# Patient Record
Sex: MALE | Race: WHITE | NOT HISPANIC OR LATINO | Employment: OTHER | URBAN - METROPOLITAN AREA
[De-identification: names, ages, dates, MRNs, and addresses within clinical notes are randomized per-mention and may not be internally consistent; named-entity substitution may affect disease eponyms.]

---

## 2019-03-16 LAB — HBA1C MFR BLD HPLC: 6.5 %

## 2019-10-23 ENCOUNTER — CONSULT (OUTPATIENT)
Dept: NEPHROLOGY | Facility: CLINIC | Age: 79
End: 2019-10-23
Payer: COMMERCIAL

## 2019-10-23 VITALS
BODY MASS INDEX: 35.59 KG/M2 | SYSTOLIC BLOOD PRESSURE: 182 MMHG | WEIGHT: 248.6 LBS | DIASTOLIC BLOOD PRESSURE: 98 MMHG | HEIGHT: 70 IN

## 2019-10-23 DIAGNOSIS — E83.9 CHRONIC KIDNEY DISEASE-MINERAL AND BONE DISORDER: ICD-10-CM

## 2019-10-23 DIAGNOSIS — N18.30 BENIGN HYPERTENSION WITH CHRONIC KIDNEY DISEASE, STAGE III (HCC): ICD-10-CM

## 2019-10-23 DIAGNOSIS — N18.9 CHRONIC KIDNEY DISEASE-MINERAL AND BONE DISORDER: ICD-10-CM

## 2019-10-23 DIAGNOSIS — I12.9 BENIGN HYPERTENSION WITH CHRONIC KIDNEY DISEASE, STAGE III (HCC): ICD-10-CM

## 2019-10-23 DIAGNOSIS — M89.9 CHRONIC KIDNEY DISEASE-MINERAL AND BONE DISORDER: ICD-10-CM

## 2019-10-23 DIAGNOSIS — R80.9 PROTEINURIA, UNSPECIFIED TYPE: Primary | ICD-10-CM

## 2019-10-23 DIAGNOSIS — N06.9 ISOLATED PROTEINURIA WITH MORPHOLOGIC LESION: ICD-10-CM

## 2019-10-23 DIAGNOSIS — N18.30 CKD (CHRONIC KIDNEY DISEASE), STAGE III (HCC): ICD-10-CM

## 2019-10-23 DIAGNOSIS — I10 RESISTANT HYPERTENSION: ICD-10-CM

## 2019-10-23 PROBLEM — R80.0 ISOLATED PROTEINURIA: Status: ACTIVE | Noted: 2019-10-23

## 2019-10-23 PROBLEM — I1A.0 RESISTANT HYPERTENSION: Status: ACTIVE | Noted: 2019-10-23

## 2019-10-23 LAB
SL AMB  POCT GLUCOSE, UA: ABNORMAL
SL AMB LEUKOCYTE ESTERASE,UA: ABNORMAL
SL AMB POCT BILIRUBIN,UA: ABNORMAL
SL AMB POCT BLOOD,UA: ABNORMAL
SL AMB POCT CLARITY,UA: CLEAR
SL AMB POCT COLOR,UA: YELLOW
SL AMB POCT KETONES,UA: ABNORMAL
SL AMB POCT NITRITE,UA: ABNORMAL
SL AMB POCT PH,UA: 6.5
SL AMB POCT SPECIFIC GRAVITY,UA: 1.01
SL AMB POCT URINE PROTEIN: ABNORMAL
SL AMB POCT UROBILINOGEN: 0.2

## 2019-10-23 PROCEDURE — 99204 OFFICE O/P NEW MOD 45 MIN: CPT | Performed by: INTERNAL MEDICINE

## 2019-10-23 PROCEDURE — 81002 URINALYSIS NONAUTO W/O SCOPE: CPT | Performed by: INTERNAL MEDICINE

## 2019-10-23 RX ORDER — LOSARTAN POTASSIUM AND HYDROCHLOROTHIAZIDE 12.5; 5 MG/1; MG/1
2 TABLET ORAL DAILY
Refills: 0 | COMMUNITY
Start: 2019-09-29 | End: 2020-10-08 | Stop reason: SDUPTHER

## 2019-10-23 RX ORDER — CLONIDINE 0.3 MG/24H
1 PATCH, EXTENDED RELEASE TRANSDERMAL WEEKLY
Refills: 6 | COMMUNITY
Start: 2019-09-30 | End: 2020-07-15 | Stop reason: SDUPTHER

## 2019-10-23 RX ORDER — THIAMINE MONONITRATE (VIT B1) 100 MG
100 TABLET ORAL DAILY
COMMUNITY

## 2019-10-23 RX ORDER — CHLORAL HYDRATE 500 MG
1000 CAPSULE ORAL DAILY
COMMUNITY

## 2019-10-23 RX ORDER — DIPHENOXYLATE HYDROCHLORIDE AND ATROPINE SULFATE 2.5; .025 MG/1; MG/1
1 TABLET ORAL DAILY
COMMUNITY

## 2019-10-23 RX ORDER — CARVEDILOL 12.5 MG/1
12.5 TABLET ORAL 2 TIMES DAILY
Refills: 3 | COMMUNITY
Start: 2019-09-28 | End: 2020-07-20 | Stop reason: SDUPTHER

## 2019-10-23 RX ORDER — FENOFIBRIC ACID 135 MG/1
1 CAPSULE, DELAYED RELEASE ORAL DAILY
Refills: 6 | COMMUNITY
Start: 2019-09-20 | End: 2021-04-06 | Stop reason: SDUPTHER

## 2019-10-23 RX ORDER — LINAGLIPTIN 5 MG/1
5 TABLET, FILM COATED ORAL DAILY
Refills: 9 | COMMUNITY
Start: 2019-09-20 | End: 2020-08-13 | Stop reason: SDUPTHER

## 2019-10-23 RX ORDER — POTASSIUM CHLORIDE 750 MG/1
10 TABLET, EXTENDED RELEASE ORAL DAILY
Refills: 10 | COMMUNITY
Start: 2019-09-20 | End: 2020-05-13 | Stop reason: SDUPTHER

## 2019-10-23 RX ORDER — PRAVASTATIN SODIUM 40 MG
40 TABLET ORAL DAILY
Refills: 3 | COMMUNITY
Start: 2019-08-14 | End: 2020-11-24 | Stop reason: SDUPTHER

## 2019-10-23 RX ORDER — ASPIRIN 325 MG
325 TABLET ORAL DAILY
COMMUNITY

## 2019-10-23 NOTE — PROGRESS NOTES
NEPHROLOGY OUTPATIENT CONSULTATION   Cory Ewing 70 y o  male MRN: [id number]  Date: 10/23/19  Reason for consultation: Initial evaluation of albuminuria    ASSESSMENT and PLAN:  1  Chronic kidney disease, stage III  · Baseline serum creatinine appears to be around 1 3-1 4  · The etiology of the CKD is not entirely clear  Given the proteinuria, it could be due to diabetic nephropathy but he reports only having had diabetes for about 5 years which would make diabetic nephropathy less likely to be the cause of his CKD  He does have long standing hypertension which could lead to hypertensive nephrosclerosis but this would not explain his proteinuria  · For workup, we will check a urinalysis and a urine protein to creatinine ratio  Additionally, will check a renal ultrasound  · We will plan to order a serologic work up depending on the degree of proteinuria  · Treatment/Management consists of controlling modifiable risk factors (good blood pressure, glycemic and lipid control) and avoidance of nephrotoxic medications in order to slow down progression of renal disease  · Since he is already on Losartan, the next step in terms of management if he has uncontrolled proteinuria would be to add Spironolactone  2  Proteinuria:  · Check urinalysis and urine protein to creatinine ratio  · Will plan for serologic work up if proteinuria is > 1 gm  · As above, consideration for adding Spironolactone if proteinuria uncontrolled  3  Hypertension:  · BP is above goal with Clonidine 0 3 mg patch, Carvedilol 12 5 mg BID, Losartan HCT 50/12 5 mg BID  · Will evaluate for secondary causes - check aldosterone, renin, metanephrines, TSH  · Check BP x 1 week prior to follow up  4  Mineral and bone disease:  · Check PTH and Vitamin D levels  Patient Instructions   Check blood and urine testing now  Check kidney ultrasound     Once your initial blood and urine test is back, I will call over the phone to discuss  Timing of the repeat labs will be depending on whether we end up starting a new medicine (Spironolactone)  Follow up in 3 months  Check BP x 1 week straight prior to next follow up  HISTORY OF PRESENT ILLNESS:  Requesting Physician: MD Irineo Manleymarco Dillon is a 70 y o  male who was referred for initial evaluation of albuminuria  Tatyana Gomez has a history of hypertension, diabetes mellitus, hyperlipidemia, and chronic kidney disease  He was 1st told about proteinuria about 6 months ago and was actually referred to Nephrology during that time but did not make an appointment  Based on my review of the record, I note that his serum creatinine has been in the range of around 1 3-1 4 basing on labs between August 2018 and October 2019  His most recent creatinine that is available to me is 1 30 from 10/22/19  He was found to have albuminuria months ago with an albumin to creatinine ratio of 811 4 mg/g  Due to the albuminuria, a renal consultation was requested  He has not had any repeat urine testing for protein excretion measurement  He is unaware of a history of frequent urinary tract infections  He uses NSAIDs occasionally - once every few weeks  PAST MEDICAL HISTORY:  1  HTN: Diagnosed about 40 years ago  No admissions for HTN urgency  Highest recalled SBP is > 200 mm Hg  BP over the past 6 months has been in range of 120s to 150s/70s  For the most part, it is in the 130s/70s  2  DM: Diagnosed about 5 years ago? HbA1c has been < 7 0    3  HLP: on statins  4  CAD: Cardiac catheterization prior to 2000 - some non obstructive disease  5  LEONORA: on CPAP  6  Bilateral leg edema: Has had this for about 10 years  Previous doppler was negative for DVT  7  BPH: not on medications now but was on Flomax before  8  DJD  9  SBO: medically treated in the past    10  Macular degeneration  11  Hay fever: as a child       No known history of CHF, CVA, PAD, COPD, asthma, thromboembolism, liver disease, GERD, malignancy  PAST SURGICAL HISTORY:  1  Tonsillectomy 1952    2  R inguinal hernia repair  3  Retinal surgery x 2  ALLERGIES:  No Known Allergies    SOCIAL HISTORY:  · Non smoker  · Social alcohol use - once a week  · No IVDA  FAMILY HISTORY:  · Negative for CKD or ESRD       MEDICATIONS:    Current Outpatient Medications:     aspirin 325 mg tablet, Take 325 mg by mouth daily, Disp: , Rfl:     carvedilol (COREG) 12 5 mg tablet, Take 12 5 mg by mouth 2 (two) times a day, Disp: , Rfl: 3    Choline Fenofibrate (FENOFIBRIC ACID) 135 MG CPDR, Take 1 capsule by mouth daily, Disp: , Rfl: 6    cloNIDine (CATAPRES-TTS-3) 0 3 mg/24 hr, Place 1 patch on the skin once a week, Disp: , Rfl: 6    co-enzyme Q-10 30 MG capsule, Take 30 mg by mouth daily, Disp: , Rfl:     Glucosamine-Chondroitin 3771-6613 MG/30ML LIQD, Take 1 tablet by mouth daily, Disp: , Rfl:     KLOR-CON M10 10 MEQ tablet, Take 10 mEq by mouth daily, Disp: , Rfl: 10    losartan-hydrochlorothiazide (HYZAAR) 50-12 5 mg per tablet, Take 2 tablets by mouth daily, Disp: , Rfl: 0    metFORMIN (GLUCOPHAGE) 500 mg tablet, Take 500 mg by mouth daily, Disp: , Rfl: 5    milk thistle 175 MG tablet, Take 175 mg by mouth daily, Disp: , Rfl:     Misc Natural Products (OSTEO BI-FLEX TRIPLE STRENGTH PO), Take by mouth, Disp: , Rfl:     multivitamin (THERAGRAN) TABS, Take 1 tablet by mouth daily, Disp: , Rfl:     Omega-3 Fatty Acids (FISH OIL) 1,000 mg, Take 1,000 mg by mouth daily, Disp: , Rfl:     pravastatin (PRAVACHOL) 40 mg tablet, Take 40 mg by mouth daily, Disp: , Rfl: 3    thiamine (VITAMIN B1) 100 mg tablet, Take 100 mg by mouth daily, Disp: , Rfl:     TRADJENTA 5 MG TABS, Take 5 mg by mouth daily, Disp: , Rfl: 9    TURMERIC PO, Take by mouth, Disp: , Rfl:     VITAMIN D, CHOLECALCIFEROL, PO, Take by mouth, Disp: , Rfl:     vitamin E 100 UNIT capsule, Take 100 Units by mouth daily, Disp: , Rfl: REVIEW OF SYSTEMS:  Review of Systems   Constitutional: Positive for fatigue  Negative for appetite change, chills and fever  HENT: Negative for postnasal drip, rhinorrhea, sinus pressure and sinus pain  Eyes: Positive for visual disturbance  Respiratory: Negative for cough and shortness of breath  Cardiovascular: Positive for leg swelling  Negative for chest pain  Gastrointestinal: Negative for abdominal pain, diarrhea, nausea and vomiting  Endocrine: Negative for polyuria  Genitourinary: Negative for dysuria and hematuria  Musculoskeletal: Positive for arthralgias and back pain  Skin: Negative for rash  Allergic/Immunologic: Negative for immunocompromised state  Neurological: Negative for dizziness and light-headedness  Hematological: Does not bruise/bleed easily  Psychiatric/Behavioral: Negative for dysphoric mood  The patient is not nervous/anxious  All the systems were reviewed and were negative except as documented on the HPI  PHYSICAL EXAMINATION:  BP (!) 182/98   Ht 5' 10" (1 778 m)   Wt 113 kg (248 lb 9 6 oz)   BMI 35 67 kg/m²   Current Weight:   Body mass index is 35 67 kg/m²  General: conscious, coherent, cooperative, not in distress  Skin: warm, dry, good turgor  Eyes: pink conjunctivae, no scleral icterus  ENT: moist lips and mucous membranes  Neck: supple, no JVD  Chest/Lungs: clear breath sounds  CVS: distinct heart sounds, normal rate, regular rhythm, (+) systolic murmur in the base  Abdomen: soft, non-tender, non-distended, normoactive bowel sounds  Extremities: (+) edema of ankles and lower legs  : deferred   Neuro: awake, alert, oriented to time, place and person  Psych: appropriate affect  LABORATORY RESULTS:  March 16, 2019:  Sodium 145, potassium 3 8, chloride 107, carbon dioxide 32, BUN 23, creatinine 1 36, calcium 9 4, hemoglobin A1c 6 5      October 22, 2019:  Sodium 141, potassium 3 5, chloride 105, CO2 28, BUN 21, creatinine 1 30 October 24, 2018:  Creatinine 1 36  August 11, 2018:  Creatinine 1 37    Office UA and microscopy: Protein 3+, Blood negative, 0-2 RBC/hpf, 0-2 WBC/hpf       IMAGING:   none

## 2019-10-23 NOTE — PATIENT INSTRUCTIONS
Check blood and urine testing now  Check kidney ultrasound  Once your initial blood and urine test is back, I will call over the phone to discuss  Timing of the repeat labs will be depending on whether we end up starting a new medicine (Spironolactone)  Follow up in 3 months  Check BP x 1 week straight prior to next follow up

## 2019-11-01 LAB
CREAT ?TM UR-SCNC: 127 UMOL/L
EXT PROTEIN URINE: 108.7
PROT/CREAT UR: 85 MG/G{CREAT}

## 2019-11-12 ENCOUNTER — TELEPHONE (OUTPATIENT)
Dept: NEPHROLOGY | Facility: CLINIC | Age: 79
End: 2019-11-12

## 2019-11-12 NOTE — TELEPHONE ENCOUNTER
Labs reviewed:    November 1, 2019:  Aldosterone 6, plasma renin activity 0 22, UPC ratio 0 85, metanephrines 34, normetanephrines 83, TSH 1 53    Discussed results with patient over the phone  No need for further work up as proteinuria is not in nephrotic range  No need to add Spironolactone at this time  No evidence of secondary causes of HTN  Recommend good BP control and good glycemic control  Advised that he check BP at home x 1 week and bring log with next visit

## 2020-01-16 LAB
CREAT ?TM UR-SCNC: 136 UMOL/L
EXT PROTEIN URINE: 163.3
PROT/CREAT UR: 1.2 MG/G{CREAT}

## 2020-01-17 ENCOUNTER — DOCUMENTATION (OUTPATIENT)
Dept: NEPHROLOGY | Facility: CLINIC | Age: 80
End: 2020-01-17

## 2020-01-17 LAB
25(OH)D3 SERPL-MCNC: 39.1 NG/ML (ref 30–100)
ALBUMIN SNV-MCNC: 4 G/DL
ALP SERPL-CCNC: 25 U/L (ref 46–116)
ALT SERPL W P-5'-P-CCNC: 15 U/L (ref 12–78)
AST SERPL W P-5'-P-CCNC: 16 U/L (ref 5–45)
BUN SERPL-MCNC: 25 MG/DL (ref 5–25)
CALCIUM SERPL-MCNC: 9.3 MG/DL (ref 8.3–10.1)
CHLORIDE SERPL-SCNC: 105 MMOL/L (ref 100–108)
CO2 SERPL-SCNC: 29 MMOL/L (ref 21–32)
CREAT SERPL-MCNC: 1.53 MG/DL (ref 0.6–1.3)
EXTERNAL PTH: 21
GLUCOSE SERPL-MCNC: 133 MG/DL
HCT VFR BLD AUTO: 37 % (ref 36.5–49.3)
HGB BLD-MCNC: 12.1 G/DL (ref 12–17)
MAGNESIUM SERPL-MCNC: 2 MG/DL (ref 1.6–2.6)
PHOSPHATE SERPL-MCNC: 3.3 MG/DL (ref 2.3–4.1)
PLATELET # BLD AUTO: 197 THOUSANDS/UL (ref 149–390)
POTASSIUM SERPL-SCNC: 3.6 MMOL/L (ref 3.5–5.3)
PROTEIN/CREAT RATIO (HISTORICAL): 1.2
SODIUM SERPL-SCNC: 141 MMOL/L (ref 136–145)
WBC # BLD AUTO: 6.5 THOUSAND/UL

## 2020-01-20 ENCOUNTER — OFFICE VISIT (OUTPATIENT)
Dept: NEPHROLOGY | Facility: CLINIC | Age: 80
End: 2020-01-20
Payer: COMMERCIAL

## 2020-01-20 VITALS
DIASTOLIC BLOOD PRESSURE: 78 MMHG | HEART RATE: 61 BPM | HEIGHT: 70 IN | BODY MASS INDEX: 35.22 KG/M2 | WEIGHT: 246 LBS | SYSTOLIC BLOOD PRESSURE: 171 MMHG

## 2020-01-20 DIAGNOSIS — I10 RESISTANT HYPERTENSION: ICD-10-CM

## 2020-01-20 DIAGNOSIS — N18.30 BENIGN HYPERTENSION WITH CHRONIC KIDNEY DISEASE, STAGE III (HCC): ICD-10-CM

## 2020-01-20 DIAGNOSIS — E83.9 CHRONIC KIDNEY DISEASE-MINERAL AND BONE DISORDER: ICD-10-CM

## 2020-01-20 DIAGNOSIS — M89.9 CHRONIC KIDNEY DISEASE-MINERAL AND BONE DISORDER: ICD-10-CM

## 2020-01-20 DIAGNOSIS — N18.30 CKD (CHRONIC KIDNEY DISEASE), STAGE III (HCC): Primary | ICD-10-CM

## 2020-01-20 DIAGNOSIS — I12.9 BENIGN HYPERTENSION WITH CHRONIC KIDNEY DISEASE, STAGE III (HCC): ICD-10-CM

## 2020-01-20 DIAGNOSIS — N06.9 ISOLATED PROTEINURIA WITH MORPHOLOGIC LESION: ICD-10-CM

## 2020-01-20 DIAGNOSIS — N18.9 CHRONIC KIDNEY DISEASE-MINERAL AND BONE DISORDER: ICD-10-CM

## 2020-01-20 PROCEDURE — 1160F RVW MEDS BY RX/DR IN RCRD: CPT | Performed by: INTERNAL MEDICINE

## 2020-01-20 PROCEDURE — 99214 OFFICE O/P EST MOD 30 MIN: CPT | Performed by: INTERNAL MEDICINE

## 2020-01-20 RX ORDER — MELATONIN
1000 DAILY
COMMUNITY

## 2020-01-20 NOTE — PATIENT INSTRUCTIONS
No change to medications this time  Recheck BMP and UPC ratio in February 2020  Continue on working to lose weight - this will help your blood pressure and the urinary protein excretion  Check blood pressure x 1 week with next office visit  Follow-up in 4 months with repeat lab work

## 2020-01-20 NOTE — LETTER
January 20, 2020     Yoselin Humphries MD  [address] [place] [address] [facility]     Patient: Shawna Ludwig   YOB: 1940   Date of Visit: 1/20/2020       Dear Dr Davey Goldmann: Thank you for referring Dex Chatman to me for evaluation  Below are my notes for this consultation  If you have questions, please do not hesitate to call me  I look forward to following your patient along with you  Sincerely,        Miriam De Leon MD        CC: No Recipients  Miriam De Leon MD  1/20/2020  2:58 PM  Sign at close encounter  [address] NOTE   Shawna Ludwig 70 y o  male MRN: [id number]  DATE: 01/20/20  Reason for visit: Continued evaluation and management of CKD    ASSESSMENT & PLAN:  1  Chronic kidney disease, stage III  · Jeremie's baseline serum creatinine appears to be around 1 3-1 4, possibly 1 5  · The etiology of the CKD is not entirely clear - we are entertaining hypertensive nephrosclerosis vs diabetic nephropathy  · His most recent creatinine is 1 53 which is higher than historical baseline but could be due to inter-reading variability  · We will have him go for repeat labs in Feb 2020  If his creatinine continues to rise, he may need a serologic workup  2  Proteinuria:  · UPC ratio is 1 20 but was 0 85  · Will continue to monitor  · I encouraged him to lose weight  · Recheck UPC with next visit  3  Hypertension:  · Home BP readings are close to but above goal with Clonidine 0 3 mg patch, Carvedilol 12 5 mg BID, Losartan HCT 50/12 5 mg BID  · No evidence of hyperaldosteronism  · Would continue to push non pharmacologic means - I recommend continued attempts at weight loss  · Check BP x 1 week with next visit  · If BP remains above goal, we may need to add Spironolactone       4  Mineral and bone disease:  · PTH is at goal - 21 on 1/17/20  · Vitamin D level is at goal - 39 1 on 1/17/20    Patient Instructions   No change to medications this time   Recheck BMP and UPC ratio in February 2020  Continue on working to lose weight - this will help your blood pressure and the urinary protein excretion  Check blood pressure x 1 week with next office visit  Follow-up in 4 months with repeat lab work  SUBJECTIVE / INTERVAL HISTORY:  Pedro Monroe was seen in the office in October 2019 on initial consultation  We checked a UPC ratio in November 2019 which was 0 85  Due to controlled proteinuria, we did not proceed with the addition of spironolactone  No hospital stays or ER visits  He denies any acute complaints at this time  His average home BP readings are: 135 9/78 4 mm Hg in AM and 134 3/70 6 mm Hg in the PM    There were no orthostatic changes noted  He denies any new medical issues  BP cuff calibration:  Home BP cuff (wrist) 183/103  Office BP cuff  186/98    PMH/PSH: HTN, DM, HLP, CAD, LEONORA, leg edema, BPH, DJD, SBO, macular degeneration, tonsillectomy, R inguinal hernia, retinal surgery  Previous work up:   November 1, 2019:  Aldosterone 6, plasma renin activity 0 22, UPC ratio 0 85, metanephrines 34, normetanephrines 83, TSH 1 53    10/25/19 Renal US: R 12 9 cm, L 14 3 cm, bilateral cortical cysts  No hydronephrosis  ALLERGIES: No Known Allergies    REVIEW OF SYSTEMS:  Review of Systems   Constitutional: Positive for fatigue  Negative for appetite change, chills and fever  Respiratory: Negative for cough and shortness of breath  Cardiovascular: Negative for chest pain and leg swelling  Gastrointestinal: Negative for abdominal pain, diarrhea, nausea and vomiting  Genitourinary: Negative for dysuria and hematuria  Musculoskeletal: Positive for arthralgias  Allergic/Immunologic: Negative for immunocompromised state  Neurological: Negative for dizziness and light-headedness       OBJECTIVE:  BP (!) 171/78 (BP Location: Left arm, Patient Position: Sitting, Cuff Size: Large)   Pulse 61   Ht 5' 10" (1 778 m)   Wt 112 kg (246 lb)   BMI 35 30 kg/m²    Current Weight:   There is no height or weight on file to calculate BMI  Physical Exam   Constitutional: He is oriented to person, place, and time  He appears well-developed and well-nourished  No distress  HENT:   Head: Normocephalic and atraumatic  Mouth/Throat: Mucous membranes are normal    Eyes: Conjunctivae are normal  No scleral icterus  Neck: Neck supple  No JVD present  Cardiovascular: Normal rate, regular rhythm and normal heart sounds  Pulmonary/Chest: Effort normal and breath sounds normal  No respiratory distress  Abdominal: Soft  Bowel sounds are normal    Musculoskeletal: He exhibits edema (mild)  Neurological: He is alert and oriented to person, place, and time  Skin: Skin is warm and dry  Psychiatric: He has a normal mood and affect   His behavior is normal  Judgment normal      Medications:  Current Outpatient Medications:     aspirin 325 mg tablet, Take 325 mg by mouth daily, Disp: , Rfl:     carvedilol (COREG) 12 5 mg tablet, Take 12 5 mg by mouth 2 (two) times a day, Disp: , Rfl: 3    Choline Fenofibrate (FENOFIBRIC ACID) 135 MG CPDR, Take 1 capsule by mouth daily, Disp: , Rfl: 6    cloNIDine (CATAPRES-TTS-3) 0 3 mg/24 hr, Place 1 patch on the skin once a week, Disp: , Rfl: 6    co-enzyme Q-10 30 MG capsule, Take 30 mg by mouth daily, Disp: , Rfl:     Glucosamine-Chondroitin 1117-2086 MG/30ML LIQD, Take 1 tablet by mouth daily, Disp: , Rfl:     KLOR-CON M10 10 MEQ tablet, Take 10 mEq by mouth daily, Disp: , Rfl: 10    losartan-hydrochlorothiazide (HYZAAR) 50-12 5 mg per tablet, Take 2 tablets by mouth daily, Disp: , Rfl: 0    metFORMIN (GLUCOPHAGE) 500 mg tablet, Take 500 mg by mouth daily, Disp: , Rfl: 5    milk thistle 175 MG tablet, Take 175 mg by mouth daily, Disp: , Rfl:     Misc Natural Products (OSTEO BI-FLEX TRIPLE STRENGTH PO), Take by mouth, Disp: , Rfl:     multivitamin (THERAGRAN) TABS, Take 1 tablet by mouth daily, Disp: , Rfl:   Omega-3 Fatty Acids (FISH OIL) 1,000 mg, Take 1,000 mg by mouth daily, Disp: , Rfl:     pravastatin (PRAVACHOL) 40 mg tablet, Take 40 mg by mouth daily, Disp: , Rfl: 3    thiamine (VITAMIN B1) 100 mg tablet, Take 100 mg by mouth daily, Disp: , Rfl:     TRADJENTA 5 MG TABS, Take 5 mg by mouth daily, Disp: , Rfl: 9    TURMERIC PO, Take by mouth, Disp: , Rfl:     VITAMIN D, CHOLECALCIFEROL, PO, Take by mouth, Disp: , Rfl:     vitamin E 100 UNIT capsule, Take 100 Units by mouth daily, Disp: , Rfl:     Laboratory Results:  Results for orders placed or performed in visit on 01/17/20   PTH, intact   Result Value Ref Range    PTH 21     Vit D, 25-Hydroxy 39 1 30 0 - 100 0 ng/mL    Protein/Creat Ratio 1 20     WBC 6 50 Thousand/uL    Hemoglobin 12 1 12 0 - 17 0 g/dL    Hematocrit 37 0 36 5 - 49 3 %    Platelets 116 571 - 426 Thousands/uL    Sodium 141 136 - 145 mmol/L    Potassium 3 6 3 5 - 5 3 mmol/L    Chloride 105 100 - 108 mmol/L    CO2 29 21 - 32 mmol/L    BUN 25 5 - 25 mg/dL    Creatinine 1 53 (A) 0 60 - 1 30 mg/dL    GFR MDRD Non Af Amer 43 ml/min/1 73sq m    Calcium 9 3 8 3 - 10 1 mg/dL    GLUCOSE RANDOM 133 mg/dL    Phosphorus 3 3 2 3 - 4 1 mg/dL    Magnesium 2 0 1 6 - 2 6 mg/dL    AST 16 5 - 45 U/L    ALT 15 12 - 78 U/L    Alkaline Phosphatase 25 (A) 46 - 116 U/L    Albumin 4 0

## 2020-01-20 NOTE — PROGRESS NOTES
NEPHROLOGY OFFICE PROGRESS NOTE   Shreyas Cardona 70 y o  male MRN: [id number]  DATE: 01/20/20  Reason for visit: Continued evaluation and management of CKD    ASSESSMENT & PLAN:  1  Chronic kidney disease, stage III  · Jeremie's baseline serum creatinine appears to be around 1 3-1 4, possibly 1 5  · The etiology of the CKD is not entirely clear - we are entertaining hypertensive nephrosclerosis vs diabetic nephropathy  · His most recent creatinine is 1 53 which is higher than historical baseline but could be due to inter-reading variability  · We will have him go for repeat labs in Feb 2020  If his creatinine continues to rise, he may need a serologic workup  2  Proteinuria:  · UPC ratio is 1 20 but was 0 85  · Will continue to monitor  · I encouraged him to lose weight  · Recheck UPC with next visit  3  Hypertension:  · Home BP readings are close to but above goal with Clonidine 0 3 mg patch, Carvedilol 12 5 mg BID, Losartan HCT 50/12 5 mg BID  · No evidence of hyperaldosteronism  · Would continue to push non pharmacologic means - I recommend continued attempts at weight loss  · Check BP x 1 week with next visit  · If BP remains above goal, we may need to add Spironolactone  4  Mineral and bone disease:  · PTH is at goal - 21 on 1/17/20  · Vitamin D level is at goal - 39 1 on 1/17/20    Patient Instructions   No change to medications this time  Recheck BMP and UPC ratio in February 2020  Continue on working to lose weight - this will help your blood pressure and the urinary protein excretion  Check blood pressure x 1 week with next office visit  Follow-up in 4 months with repeat lab work  SUBJECTIVE / INTERVAL HISTORY:  Viviane Bradley was seen in the office in October 2019 on initial consultation  We checked a UPC ratio in November 2019 which was 0 85  Due to controlled proteinuria, we did not proceed with the addition of spironolactone  No hospital stays or ER visits    He denies any acute complaints at this time  His average home BP readings are: 135 9/78 4 mm Hg in AM and 134 3/70 6 mm Hg in the PM    There were no orthostatic changes noted  He denies any new medical issues  BP cuff calibration:  Home BP cuff (wrist) 183/103  Office BP cuff  186/98    PMH/PSH: HTN, DM, HLP, CAD, LEONORA, leg edema, BPH, DJD, SBO, macular degeneration, tonsillectomy, R inguinal hernia, retinal surgery  Previous work up:   November 1, 2019:  Aldosterone 6, plasma renin activity 0 22, UPC ratio 0 85, metanephrines 34, normetanephrines 83, TSH 1 53    10/25/19 Renal US: R 12 9 cm, L 14 3 cm, bilateral cortical cysts  No hydronephrosis  ALLERGIES: No Known Allergies    REVIEW OF SYSTEMS:  Review of Systems   Constitutional: Positive for fatigue  Negative for appetite change, chills and fever  Respiratory: Negative for cough and shortness of breath  Cardiovascular: Negative for chest pain and leg swelling  Gastrointestinal: Negative for abdominal pain, diarrhea, nausea and vomiting  Genitourinary: Negative for dysuria and hematuria  Musculoskeletal: Positive for arthralgias  Allergic/Immunologic: Negative for immunocompromised state  Neurological: Negative for dizziness and light-headedness  OBJECTIVE:  BP (!) 171/78 (BP Location: Left arm, Patient Position: Sitting, Cuff Size: Large)   Pulse 61   Ht 5' 10" (1 778 m)   Wt 112 kg (246 lb)   BMI 35 30 kg/m²   Current Weight:   There is no height or weight on file to calculate BMI  Physical Exam   Constitutional: He is oriented to person, place, and time  He appears well-developed and well-nourished  No distress  HENT:   Head: Normocephalic and atraumatic  Mouth/Throat: Mucous membranes are normal    Eyes: Conjunctivae are normal  No scleral icterus  Neck: Neck supple  No JVD present  Cardiovascular: Normal rate, regular rhythm and normal heart sounds     Pulmonary/Chest: Effort normal and breath sounds normal  No respiratory distress  Abdominal: Soft  Bowel sounds are normal    Musculoskeletal: He exhibits edema (mild)  Neurological: He is alert and oriented to person, place, and time  Skin: Skin is warm and dry  Psychiatric: He has a normal mood and affect   His behavior is normal  Judgment normal      Medications:  Current Outpatient Medications:     aspirin 325 mg tablet, Take 325 mg by mouth daily, Disp: , Rfl:     carvedilol (COREG) 12 5 mg tablet, Take 12 5 mg by mouth 2 (two) times a day, Disp: , Rfl: 3    Choline Fenofibrate (FENOFIBRIC ACID) 135 MG CPDR, Take 1 capsule by mouth daily, Disp: , Rfl: 6    cloNIDine (CATAPRES-TTS-3) 0 3 mg/24 hr, Place 1 patch on the skin once a week, Disp: , Rfl: 6    co-enzyme Q-10 30 MG capsule, Take 30 mg by mouth daily, Disp: , Rfl:     Glucosamine-Chondroitin 1856-1449 MG/30ML LIQD, Take 1 tablet by mouth daily, Disp: , Rfl:     KLOR-CON M10 10 MEQ tablet, Take 10 mEq by mouth daily, Disp: , Rfl: 10    losartan-hydrochlorothiazide (HYZAAR) 50-12 5 mg per tablet, Take 2 tablets by mouth daily, Disp: , Rfl: 0    metFORMIN (GLUCOPHAGE) 500 mg tablet, Take 500 mg by mouth daily, Disp: , Rfl: 5    milk thistle 175 MG tablet, Take 175 mg by mouth daily, Disp: , Rfl:     Misc Natural Products (OSTEO BI-FLEX TRIPLE STRENGTH PO), Take by mouth, Disp: , Rfl:     multivitamin (THERAGRAN) TABS, Take 1 tablet by mouth daily, Disp: , Rfl:     Omega-3 Fatty Acids (FISH OIL) 1,000 mg, Take 1,000 mg by mouth daily, Disp: , Rfl:     pravastatin (PRAVACHOL) 40 mg tablet, Take 40 mg by mouth daily, Disp: , Rfl: 3    thiamine (VITAMIN B1) 100 mg tablet, Take 100 mg by mouth daily, Disp: , Rfl:     TRADJENTA 5 MG TABS, Take 5 mg by mouth daily, Disp: , Rfl: 9    TURMERIC PO, Take by mouth, Disp: , Rfl:     VITAMIN D, CHOLECALCIFEROL, PO, Take by mouth, Disp: , Rfl:     vitamin E 100 UNIT capsule, Take 100 Units by mouth daily, Disp: , Rfl:     Laboratory Results:  Results for orders placed or performed in visit on 01/17/20   PTH, intact   Result Value Ref Range    PTH 21     Vit D, 25-Hydroxy 39 1 30 0 - 100 0 ng/mL    Protein/Creat Ratio 1 20     WBC 6 50 Thousand/uL    Hemoglobin 12 1 12 0 - 17 0 g/dL    Hematocrit 37 0 36 5 - 49 3 %    Platelets 189 813 - 929 Thousands/uL    Sodium 141 136 - 145 mmol/L    Potassium 3 6 3 5 - 5 3 mmol/L    Chloride 105 100 - 108 mmol/L    CO2 29 21 - 32 mmol/L    BUN 25 5 - 25 mg/dL    Creatinine 1 53 (A) 0 60 - 1 30 mg/dL    GFR MDRD Non Af Amer 43 ml/min/1 73sq m    Calcium 9 3 8 3 - 10 1 mg/dL    GLUCOSE RANDOM 133 mg/dL    Phosphorus 3 3 2 3 - 4 1 mg/dL    Magnesium 2 0 1 6 - 2 6 mg/dL    AST 16 5 - 45 U/L    ALT 15 12 - 78 U/L    Alkaline Phosphatase 25 (A) 46 - 116 U/L    Albumin 4 0

## 2020-05-13 DIAGNOSIS — E87.6 HYPOKALEMIA: Primary | ICD-10-CM

## 2020-05-13 RX ORDER — POTASSIUM CHLORIDE 750 MG/1
10 TABLET, EXTENDED RELEASE ORAL DAILY
Qty: 90 TABLET | Refills: 2 | Status: SHIPPED | OUTPATIENT
Start: 2020-05-13 | End: 2020-11-12

## 2020-06-08 ENCOUNTER — OFFICE VISIT (OUTPATIENT)
Dept: INTERNAL MEDICINE CLINIC | Facility: CLINIC | Age: 80
End: 2020-06-08
Payer: COMMERCIAL

## 2020-06-08 VITALS
TEMPERATURE: 98.7 F | HEART RATE: 66 BPM | WEIGHT: 239 LBS | DIASTOLIC BLOOD PRESSURE: 80 MMHG | HEIGHT: 70 IN | OXYGEN SATURATION: 97 % | SYSTOLIC BLOOD PRESSURE: 141 MMHG | BODY MASS INDEX: 34.22 KG/M2

## 2020-06-08 DIAGNOSIS — E66.9 DIABETES MELLITUS TYPE 2 IN OBESE (HCC): ICD-10-CM

## 2020-06-08 DIAGNOSIS — B35.9 TINEA: ICD-10-CM

## 2020-06-08 DIAGNOSIS — E11.69 DIABETES MELLITUS TYPE 2 IN OBESE (HCC): ICD-10-CM

## 2020-06-08 DIAGNOSIS — I12.9 BENIGN HYPERTENSION WITH CHRONIC KIDNEY DISEASE, STAGE III (HCC): ICD-10-CM

## 2020-06-08 DIAGNOSIS — N18.30 CKD (CHRONIC KIDNEY DISEASE), STAGE III (HCC): Primary | ICD-10-CM

## 2020-06-08 DIAGNOSIS — N18.30 BENIGN HYPERTENSION WITH CHRONIC KIDNEY DISEASE, STAGE III (HCC): ICD-10-CM

## 2020-06-08 PROCEDURE — 3079F DIAST BP 80-89 MM HG: CPT | Performed by: INTERNAL MEDICINE

## 2020-06-08 PROCEDURE — 99214 OFFICE O/P EST MOD 30 MIN: CPT | Performed by: INTERNAL MEDICINE

## 2020-06-08 PROCEDURE — 3077F SYST BP >= 140 MM HG: CPT | Performed by: INTERNAL MEDICINE

## 2020-06-08 PROCEDURE — 3066F NEPHROPATHY DOC TX: CPT | Performed by: INTERNAL MEDICINE

## 2020-06-08 PROCEDURE — 1036F TOBACCO NON-USER: CPT | Performed by: INTERNAL MEDICINE

## 2020-06-08 PROCEDURE — 1160F RVW MEDS BY RX/DR IN RCRD: CPT | Performed by: INTERNAL MEDICINE

## 2020-06-08 PROCEDURE — 3008F BODY MASS INDEX DOCD: CPT | Performed by: INTERNAL MEDICINE

## 2020-06-08 RX ORDER — KETOCONAZOLE 20 MG/G
CREAM TOPICAL DAILY
Qty: 30 G | Refills: 0 | Status: SHIPPED | OUTPATIENT
Start: 2020-06-08 | End: 2022-05-24

## 2020-06-25 ENCOUNTER — APPOINTMENT (OUTPATIENT)
Dept: LAB | Facility: CLINIC | Age: 80
End: 2020-06-25
Payer: COMMERCIAL

## 2020-06-25 DIAGNOSIS — E11.69 DIABETES MELLITUS TYPE 2 IN OBESE (HCC): ICD-10-CM

## 2020-06-25 DIAGNOSIS — N18.30 CKD (CHRONIC KIDNEY DISEASE), STAGE III (HCC): ICD-10-CM

## 2020-06-25 DIAGNOSIS — E66.9 DIABETES MELLITUS TYPE 2 IN OBESE (HCC): ICD-10-CM

## 2020-06-25 DIAGNOSIS — N18.6 TYPE 2 DIABETES MELLITUS WITH ESRD (END-STAGE RENAL DISEASE) (HCC): ICD-10-CM

## 2020-06-25 DIAGNOSIS — Z12.5 SPECIAL SCREENING FOR MALIGNANT NEOPLASM OF PROSTATE: ICD-10-CM

## 2020-06-25 DIAGNOSIS — N06.9 ISOLATED PROTEINURIA WITH MORPHOLOGIC LESION: ICD-10-CM

## 2020-06-25 DIAGNOSIS — E11.22 TYPE 2 DIABETES MELLITUS WITH ESRD (END-STAGE RENAL DISEASE) (HCC): ICD-10-CM

## 2020-06-25 DIAGNOSIS — I10 ESSENTIAL HYPERTENSION, BENIGN: Primary | ICD-10-CM

## 2020-06-25 LAB
ALBUMIN SERPL BCP-MCNC: 3.8 G/DL (ref 3.5–5)
ALP SERPL-CCNC: 30 U/L (ref 46–116)
ALT SERPL W P-5'-P-CCNC: 28 U/L (ref 12–78)
ANION GAP SERPL CALCULATED.3IONS-SCNC: 4 MMOL/L (ref 4–13)
AST SERPL W P-5'-P-CCNC: 16 U/L (ref 5–45)
BASOPHILS # BLD AUTO: 0.06 THOUSANDS/ΜL (ref 0–0.1)
BASOPHILS NFR BLD AUTO: 1 % (ref 0–1)
BILIRUB SERPL-MCNC: 0.59 MG/DL (ref 0.2–1)
BUN SERPL-MCNC: 26 MG/DL (ref 5–25)
CALCIUM SERPL-MCNC: 9.7 MG/DL (ref 8.3–10.1)
CHLORIDE SERPL-SCNC: 111 MMOL/L (ref 100–108)
CHOLEST SERPL-MCNC: 183 MG/DL (ref 50–200)
CO2 SERPL-SCNC: 27 MMOL/L (ref 21–32)
CREAT SERPL-MCNC: 1.57 MG/DL (ref 0.6–1.3)
CREAT UR-MCNC: 129 MG/DL
EOSINOPHIL # BLD AUTO: 0.09 THOUSAND/ΜL (ref 0–0.61)
EOSINOPHIL NFR BLD AUTO: 1 % (ref 0–6)
ERYTHROCYTE [DISTWIDTH] IN BLOOD BY AUTOMATED COUNT: 13.3 % (ref 11.6–15.1)
EST. AVERAGE GLUCOSE BLD GHB EST-MCNC: 140 MG/DL
GFR SERPL CREATININE-BSD FRML MDRD: 42 ML/MIN/1.73SQ M
GLUCOSE P FAST SERPL-MCNC: 133 MG/DL (ref 65–99)
HBA1C MFR BLD: 6.5 %
HCT VFR BLD AUTO: 40.6 % (ref 36.5–49.3)
HDLC SERPL-MCNC: 47 MG/DL
HGB BLD-MCNC: 13.3 G/DL (ref 12–17)
IMM GRANULOCYTES # BLD AUTO: 0.02 THOUSAND/UL (ref 0–0.2)
IMM GRANULOCYTES NFR BLD AUTO: 0 % (ref 0–2)
LDLC SERPL CALC-MCNC: 113 MG/DL (ref 0–100)
LYMPHOCYTES # BLD AUTO: 2.43 THOUSANDS/ΜL (ref 0.6–4.47)
LYMPHOCYTES NFR BLD AUTO: 38 % (ref 14–44)
MAGNESIUM SERPL-MCNC: 2.2 MG/DL (ref 1.6–2.6)
MCH RBC QN AUTO: 30.4 PG (ref 26.8–34.3)
MCHC RBC AUTO-ENTMCNC: 32.8 G/DL (ref 31.4–37.4)
MCV RBC AUTO: 93 FL (ref 82–98)
MONOCYTES # BLD AUTO: 0.48 THOUSAND/ΜL (ref 0.17–1.22)
MONOCYTES NFR BLD AUTO: 8 % (ref 4–12)
NEUTROPHILS # BLD AUTO: 3.28 THOUSANDS/ΜL (ref 1.85–7.62)
NEUTS SEG NFR BLD AUTO: 52 % (ref 43–75)
NONHDLC SERPL-MCNC: 136 MG/DL
NRBC BLD AUTO-RTO: 0 /100 WBCS
PHOSPHATE SERPL-MCNC: 3.2 MG/DL (ref 2.3–4.1)
PLATELET # BLD AUTO: 213 THOUSANDS/UL (ref 149–390)
PMV BLD AUTO: 10.4 FL (ref 8.9–12.7)
POTASSIUM SERPL-SCNC: 3.8 MMOL/L (ref 3.5–5.3)
PROT SERPL-MCNC: 7.3 G/DL (ref 6.4–8.2)
PROT UR-MCNC: 151 MG/DL
PROT/CREAT UR: 1.17 MG/G{CREAT} (ref 0–0.1)
PSA SERPL-MCNC: 2.3 NG/ML (ref 0–4)
RBC # BLD AUTO: 4.38 MILLION/UL (ref 3.88–5.62)
SODIUM SERPL-SCNC: 142 MMOL/L (ref 136–145)
TRIGL SERPL-MCNC: 113 MG/DL
TSH SERPL DL<=0.05 MIU/L-ACNC: 1.53 UIU/ML (ref 0.36–3.74)
WBC # BLD AUTO: 6.36 THOUSAND/UL (ref 4.31–10.16)

## 2020-06-25 PROCEDURE — 80061 LIPID PANEL: CPT

## 2020-06-25 PROCEDURE — 36415 COLL VENOUS BLD VENIPUNCTURE: CPT

## 2020-06-25 PROCEDURE — 83735 ASSAY OF MAGNESIUM: CPT

## 2020-06-25 PROCEDURE — 84100 ASSAY OF PHOSPHORUS: CPT

## 2020-06-25 PROCEDURE — 84156 ASSAY OF PROTEIN URINE: CPT

## 2020-06-25 PROCEDURE — 85025 COMPLETE CBC W/AUTO DIFF WBC: CPT

## 2020-06-25 PROCEDURE — 80053 COMPREHEN METABOLIC PANEL: CPT

## 2020-06-25 PROCEDURE — G0103 PSA SCREENING: HCPCS

## 2020-06-25 PROCEDURE — 82570 ASSAY OF URINE CREATININE: CPT

## 2020-06-25 PROCEDURE — 84443 ASSAY THYROID STIM HORMONE: CPT

## 2020-06-25 PROCEDURE — 83036 HEMOGLOBIN GLYCOSYLATED A1C: CPT

## 2020-06-30 ENCOUNTER — OFFICE VISIT (OUTPATIENT)
Dept: NEPHROLOGY | Facility: CLINIC | Age: 80
End: 2020-06-30
Payer: COMMERCIAL

## 2020-06-30 VITALS
DIASTOLIC BLOOD PRESSURE: 74 MMHG | BODY MASS INDEX: 34.04 KG/M2 | HEIGHT: 70 IN | SYSTOLIC BLOOD PRESSURE: 142 MMHG | TEMPERATURE: 97.8 F | WEIGHT: 237.8 LBS

## 2020-06-30 DIAGNOSIS — N18.30 CKD (CHRONIC KIDNEY DISEASE), STAGE III (HCC): Primary | ICD-10-CM

## 2020-06-30 DIAGNOSIS — E83.9 CHRONIC KIDNEY DISEASE-MINERAL AND BONE DISORDER: ICD-10-CM

## 2020-06-30 DIAGNOSIS — N18.30 BENIGN HYPERTENSION WITH CHRONIC KIDNEY DISEASE, STAGE III (HCC): ICD-10-CM

## 2020-06-30 DIAGNOSIS — M89.9 CHRONIC KIDNEY DISEASE-MINERAL AND BONE DISORDER: ICD-10-CM

## 2020-06-30 DIAGNOSIS — N06.9 ISOLATED PROTEINURIA WITH MORPHOLOGIC LESION: ICD-10-CM

## 2020-06-30 DIAGNOSIS — N18.9 CHRONIC KIDNEY DISEASE-MINERAL AND BONE DISORDER: ICD-10-CM

## 2020-06-30 DIAGNOSIS — I12.9 BENIGN HYPERTENSION WITH CHRONIC KIDNEY DISEASE, STAGE III (HCC): ICD-10-CM

## 2020-06-30 PROCEDURE — 1036F TOBACCO NON-USER: CPT | Performed by: INTERNAL MEDICINE

## 2020-06-30 PROCEDURE — 3078F DIAST BP <80 MM HG: CPT | Performed by: INTERNAL MEDICINE

## 2020-06-30 PROCEDURE — 1160F RVW MEDS BY RX/DR IN RCRD: CPT | Performed by: INTERNAL MEDICINE

## 2020-06-30 PROCEDURE — 3044F HG A1C LEVEL LT 7.0%: CPT | Performed by: INTERNAL MEDICINE

## 2020-06-30 PROCEDURE — 3066F NEPHROPATHY DOC TX: CPT | Performed by: INTERNAL MEDICINE

## 2020-06-30 PROCEDURE — 99214 OFFICE O/P EST MOD 30 MIN: CPT | Performed by: INTERNAL MEDICINE

## 2020-06-30 PROCEDURE — 3077F SYST BP >= 140 MM HG: CPT | Performed by: INTERNAL MEDICINE

## 2020-06-30 PROCEDURE — 3008F BODY MASS INDEX DOCD: CPT | Performed by: INTERNAL MEDICINE

## 2020-07-15 DIAGNOSIS — I10 ESSENTIAL HYPERTENSION: Primary | ICD-10-CM

## 2020-07-16 RX ORDER — CLONIDINE 0.3 MG/24H
1 PATCH, EXTENDED RELEASE TRANSDERMAL WEEKLY
Qty: 4 PATCH | Refills: 6 | Status: SHIPPED | OUTPATIENT
Start: 2020-07-16 | End: 2021-01-25

## 2020-07-20 DIAGNOSIS — E78.2 MIXED HYPERLIPIDEMIA: Primary | ICD-10-CM

## 2020-07-20 RX ORDER — CARVEDILOL 12.5 MG/1
12.5 TABLET ORAL 2 TIMES DAILY
Qty: 180 TABLET | Refills: 3 | Status: SHIPPED | OUTPATIENT
Start: 2020-07-20 | End: 2021-07-06

## 2020-08-13 DIAGNOSIS — E11.9 TYPE 2 DIABETES MELLITUS WITHOUT COMPLICATION, WITH LONG-TERM CURRENT USE OF INSULIN (HCC): Primary | ICD-10-CM

## 2020-08-13 DIAGNOSIS — Z79.4 TYPE 2 DIABETES MELLITUS WITHOUT COMPLICATION, WITH LONG-TERM CURRENT USE OF INSULIN (HCC): Primary | ICD-10-CM

## 2020-08-13 RX ORDER — LINAGLIPTIN 5 MG/1
5 TABLET, FILM COATED ORAL DAILY
Qty: 90 TABLET | Refills: 3 | Status: SHIPPED | OUTPATIENT
Start: 2020-08-13 | End: 2020-12-11

## 2020-09-03 ENCOUNTER — TRANSCRIBE ORDERS (OUTPATIENT)
Dept: LAB | Facility: CLINIC | Age: 80
End: 2020-09-03

## 2020-09-03 ENCOUNTER — APPOINTMENT (OUTPATIENT)
Dept: LAB | Facility: CLINIC | Age: 80
End: 2020-09-03
Payer: COMMERCIAL

## 2020-09-03 DIAGNOSIS — N18.30 CKD (CHRONIC KIDNEY DISEASE), STAGE III (HCC): ICD-10-CM

## 2020-09-03 LAB
ANION GAP SERPL CALCULATED.3IONS-SCNC: 6 MMOL/L (ref 4–13)
BACTERIA UR QL AUTO: ABNORMAL /HPF
BILIRUB UR QL STRIP: NEGATIVE
BUN SERPL-MCNC: 27 MG/DL (ref 5–25)
CALCIUM SERPL-MCNC: 9.2 MG/DL (ref 8.3–10.1)
CHLORIDE SERPL-SCNC: 111 MMOL/L (ref 100–108)
CLARITY UR: CLEAR
CO2 SERPL-SCNC: 27 MMOL/L (ref 21–32)
COLOR UR: YELLOW
CREAT SERPL-MCNC: 1.45 MG/DL (ref 0.6–1.3)
CREAT UR-MCNC: 117 MG/DL
GFR SERPL CREATININE-BSD FRML MDRD: 46 ML/MIN/1.73SQ M
GLUCOSE P FAST SERPL-MCNC: 118 MG/DL (ref 65–99)
GLUCOSE UR STRIP-MCNC: NEGATIVE MG/DL
HGB UR QL STRIP.AUTO: NEGATIVE
HYALINE CASTS #/AREA URNS LPF: ABNORMAL /LPF
KETONES UR STRIP-MCNC: NEGATIVE MG/DL
LEUKOCYTE ESTERASE UR QL STRIP: NEGATIVE
NITRITE UR QL STRIP: NEGATIVE
NON-SQ EPI CELLS URNS QL MICRO: ABNORMAL /HPF
PH UR STRIP.AUTO: 5.5 [PH]
POTASSIUM SERPL-SCNC: 3.5 MMOL/L (ref 3.5–5.3)
PROT UR STRIP-MCNC: ABNORMAL MG/DL
PROT UR-MCNC: 104 MG/DL
PROT/CREAT UR: 0.89 MG/G{CREAT} (ref 0–0.1)
RBC #/AREA URNS AUTO: ABNORMAL /HPF
SODIUM SERPL-SCNC: 144 MMOL/L (ref 136–145)
SP GR UR STRIP.AUTO: 1.02 (ref 1–1.03)
UROBILINOGEN UR QL STRIP.AUTO: 0.2 E.U./DL
WBC #/AREA URNS AUTO: ABNORMAL /HPF

## 2020-09-03 PROCEDURE — 84156 ASSAY OF PROTEIN URINE: CPT

## 2020-09-03 PROCEDURE — 81001 URINALYSIS AUTO W/SCOPE: CPT

## 2020-09-03 PROCEDURE — 80048 BASIC METABOLIC PNL TOTAL CA: CPT

## 2020-09-03 PROCEDURE — 82570 ASSAY OF URINE CREATININE: CPT

## 2020-09-03 PROCEDURE — 36415 COLL VENOUS BLD VENIPUNCTURE: CPT

## 2020-09-08 ENCOUNTER — TELEPHONE (OUTPATIENT)
Dept: NEPHROLOGY | Facility: CLINIC | Age: 80
End: 2020-09-08

## 2020-09-08 NOTE — TELEPHONE ENCOUNTER
----- Message from Mellisa Ramsey MD sent at 9/4/2020  3:03 PM EDT -----  Please let Mikel Cerna know that renal function is stable

## 2020-10-08 DIAGNOSIS — I12.9 BENIGN HYPERTENSION WITH CHRONIC KIDNEY DISEASE, STAGE III (HCC): Primary | ICD-10-CM

## 2020-10-08 DIAGNOSIS — N18.30 BENIGN HYPERTENSION WITH CHRONIC KIDNEY DISEASE, STAGE III (HCC): Primary | ICD-10-CM

## 2020-10-09 RX ORDER — LOSARTAN POTASSIUM AND HYDROCHLOROTHIAZIDE 12.5; 5 MG/1; MG/1
2 TABLET ORAL DAILY
Qty: 180 TABLET | Refills: 0 | Status: SHIPPED | OUTPATIENT
Start: 2020-10-09 | End: 2021-01-11

## 2020-11-03 ENCOUNTER — APPOINTMENT (OUTPATIENT)
Dept: LAB | Facility: CLINIC | Age: 80
End: 2020-11-03
Payer: COMMERCIAL

## 2020-11-03 DIAGNOSIS — N18.30 CKD (CHRONIC KIDNEY DISEASE), STAGE III (HCC): ICD-10-CM

## 2020-11-03 LAB
ALBUMIN SERPL BCP-MCNC: 3.7 G/DL (ref 3.5–5)
ANION GAP SERPL CALCULATED.3IONS-SCNC: 5 MMOL/L (ref 4–13)
BUN SERPL-MCNC: 24 MG/DL (ref 5–25)
CALCIUM SERPL-MCNC: 9.8 MG/DL (ref 8.3–10.1)
CHLORIDE SERPL-SCNC: 107 MMOL/L (ref 100–108)
CO2 SERPL-SCNC: 30 MMOL/L (ref 21–32)
CREAT SERPL-MCNC: 1.55 MG/DL (ref 0.6–1.3)
CREAT UR-MCNC: 70.9 MG/DL
GFR SERPL CREATININE-BSD FRML MDRD: 42 ML/MIN/1.73SQ M
GLUCOSE P FAST SERPL-MCNC: 112 MG/DL (ref 65–99)
PHOSPHATE SERPL-MCNC: 3.2 MG/DL (ref 2.3–4.1)
POTASSIUM SERPL-SCNC: 3.5 MMOL/L (ref 3.5–5.3)
PROT UR-MCNC: 86 MG/DL
PROT/CREAT UR: 1.21 MG/G{CREAT} (ref 0–0.1)
SODIUM SERPL-SCNC: 142 MMOL/L (ref 136–145)

## 2020-11-03 PROCEDURE — 84156 ASSAY OF PROTEIN URINE: CPT

## 2020-11-03 PROCEDURE — 80069 RENAL FUNCTION PANEL: CPT

## 2020-11-03 PROCEDURE — 36415 COLL VENOUS BLD VENIPUNCTURE: CPT

## 2020-11-03 PROCEDURE — 82570 ASSAY OF URINE CREATININE: CPT

## 2020-11-12 ENCOUNTER — TELEMEDICINE (OUTPATIENT)
Dept: NEPHROLOGY | Facility: CLINIC | Age: 80
End: 2020-11-12
Payer: COMMERCIAL

## 2020-11-12 DIAGNOSIS — N18.30 BENIGN HYPERTENSION WITH CHRONIC KIDNEY DISEASE, STAGE III (HCC): ICD-10-CM

## 2020-11-12 DIAGNOSIS — M89.9 CHRONIC KIDNEY DISEASE-MINERAL AND BONE DISORDER: ICD-10-CM

## 2020-11-12 DIAGNOSIS — I10 RESISTANT HYPERTENSION: ICD-10-CM

## 2020-11-12 DIAGNOSIS — E87.6 HYPOKALEMIA: ICD-10-CM

## 2020-11-12 DIAGNOSIS — N18.32 STAGE 3B CHRONIC KIDNEY DISEASE (HCC): Primary | ICD-10-CM

## 2020-11-12 DIAGNOSIS — E83.9 CHRONIC KIDNEY DISEASE-MINERAL AND BONE DISORDER: ICD-10-CM

## 2020-11-12 DIAGNOSIS — I12.9 BENIGN HYPERTENSION WITH CHRONIC KIDNEY DISEASE, STAGE III (HCC): ICD-10-CM

## 2020-11-12 DIAGNOSIS — N06.9 ISOLATED PROTEINURIA WITH MORPHOLOGIC LESION: ICD-10-CM

## 2020-11-12 DIAGNOSIS — N18.9 CHRONIC KIDNEY DISEASE-MINERAL AND BONE DISORDER: ICD-10-CM

## 2020-11-12 PROCEDURE — 99214 OFFICE O/P EST MOD 30 MIN: CPT | Performed by: INTERNAL MEDICINE

## 2020-11-12 RX ORDER — SPIRONOLACTONE 25 MG/1
25 TABLET ORAL EVERY OTHER DAY
Qty: 45 TABLET | Refills: 2 | Status: SHIPPED | OUTPATIENT
Start: 2020-11-12 | End: 2021-07-15

## 2020-11-24 DIAGNOSIS — E78.2 MIXED HYPERLIPIDEMIA: Primary | ICD-10-CM

## 2020-11-24 RX ORDER — PRAVASTATIN SODIUM 40 MG
40 TABLET ORAL DAILY
Qty: 90 TABLET | Refills: 1 | Status: SHIPPED | OUTPATIENT
Start: 2020-11-24 | End: 2021-05-18

## 2020-12-10 DIAGNOSIS — E11.9 TYPE 2 DIABETES MELLITUS WITHOUT COMPLICATION, WITH LONG-TERM CURRENT USE OF INSULIN (HCC): ICD-10-CM

## 2020-12-10 DIAGNOSIS — Z79.4 TYPE 2 DIABETES MELLITUS WITHOUT COMPLICATION, WITH LONG-TERM CURRENT USE OF INSULIN (HCC): ICD-10-CM

## 2020-12-11 RX ORDER — LINAGLIPTIN 5 MG/1
5 TABLET, FILM COATED ORAL DAILY
Qty: 30 TABLET | Refills: 3 | Status: SHIPPED | OUTPATIENT
Start: 2020-12-11 | End: 2021-04-19

## 2021-01-04 ENCOUNTER — LAB (OUTPATIENT)
Dept: LAB | Facility: CLINIC | Age: 81
End: 2021-01-04
Payer: COMMERCIAL

## 2021-01-04 ENCOUNTER — TRANSCRIBE ORDERS (OUTPATIENT)
Dept: LAB | Facility: CLINIC | Age: 81
End: 2021-01-04

## 2021-01-04 DIAGNOSIS — N18.32 STAGE 3B CHRONIC KIDNEY DISEASE (HCC): ICD-10-CM

## 2021-01-04 LAB
ANION GAP SERPL CALCULATED.3IONS-SCNC: 5 MMOL/L (ref 4–13)
BUN SERPL-MCNC: 28 MG/DL (ref 5–25)
CALCIUM SERPL-MCNC: 9.8 MG/DL (ref 8.3–10.1)
CHLORIDE SERPL-SCNC: 112 MMOL/L (ref 100–108)
CO2 SERPL-SCNC: 27 MMOL/L (ref 21–32)
CREAT SERPL-MCNC: 1.61 MG/DL (ref 0.6–1.3)
GFR SERPL CREATININE-BSD FRML MDRD: 40 ML/MIN/1.73SQ M
GLUCOSE P FAST SERPL-MCNC: 153 MG/DL (ref 65–99)
POTASSIUM SERPL-SCNC: 3.8 MMOL/L (ref 3.5–5.3)
SODIUM SERPL-SCNC: 144 MMOL/L (ref 136–145)

## 2021-01-04 PROCEDURE — 80048 BASIC METABOLIC PNL TOTAL CA: CPT

## 2021-01-04 PROCEDURE — 36415 COLL VENOUS BLD VENIPUNCTURE: CPT

## 2021-01-05 ENCOUNTER — TELEPHONE (OUTPATIENT)
Dept: NEPHROLOGY | Facility: CLINIC | Age: 81
End: 2021-01-05

## 2021-01-05 NOTE — TELEPHONE ENCOUNTER
Message left on patient's voicemail that labs of 1/4/21 are stable per  Dr Reese Portillo     ----- Message from Kirsten Pate MD sent at 1/5/2021  1:33 PM EST -----  Please inform patient that kidney function is stable based on latest lab tests (1/4/21)

## 2021-01-11 DIAGNOSIS — N18.30 BENIGN HYPERTENSION WITH CHRONIC KIDNEY DISEASE, STAGE III (HCC): ICD-10-CM

## 2021-01-11 DIAGNOSIS — I12.9 BENIGN HYPERTENSION WITH CHRONIC KIDNEY DISEASE, STAGE III (HCC): ICD-10-CM

## 2021-01-11 RX ORDER — LOSARTAN POTASSIUM AND HYDROCHLOROTHIAZIDE 12.5; 5 MG/1; MG/1
TABLET ORAL
Qty: 180 TABLET | Refills: 0 | Status: SHIPPED | OUTPATIENT
Start: 2021-01-11 | End: 2021-04-06

## 2021-01-22 DIAGNOSIS — I10 ESSENTIAL HYPERTENSION: ICD-10-CM

## 2021-01-25 RX ORDER — CLONIDINE 0.3 MG/24H
1 PATCH, EXTENDED RELEASE TRANSDERMAL WEEKLY
Qty: 4 PATCH | Refills: 6 | Status: SHIPPED | OUTPATIENT
Start: 2021-01-25 | End: 2021-09-07

## 2021-01-28 ENCOUNTER — TELEPHONE (OUTPATIENT)
Dept: NEPHROLOGY | Facility: CLINIC | Age: 81
End: 2021-01-28

## 2021-01-28 NOTE — TELEPHONE ENCOUNTER
I left message for patient to call the office to schedule his March follow up appointment with Dr Andrew Delgado

## 2021-04-04 DIAGNOSIS — I12.9 BENIGN HYPERTENSION WITH CHRONIC KIDNEY DISEASE, STAGE III (HCC): ICD-10-CM

## 2021-04-04 DIAGNOSIS — N18.30 BENIGN HYPERTENSION WITH CHRONIC KIDNEY DISEASE, STAGE III (HCC): ICD-10-CM

## 2021-04-06 ENCOUNTER — TELEPHONE (OUTPATIENT)
Dept: FAMILY MEDICINE CLINIC | Facility: CLINIC | Age: 81
End: 2021-04-06

## 2021-04-06 DIAGNOSIS — E11.9 TYPE 2 DIABETES MELLITUS WITHOUT COMPLICATION, WITHOUT LONG-TERM CURRENT USE OF INSULIN (HCC): Primary | ICD-10-CM

## 2021-04-06 DIAGNOSIS — E78.2 MIXED HYPERLIPIDEMIA: ICD-10-CM

## 2021-04-06 RX ORDER — LOSARTAN POTASSIUM AND HYDROCHLOROTHIAZIDE 12.5; 5 MG/1; MG/1
TABLET ORAL
Qty: 180 TABLET | Refills: 0 | Status: SHIPPED | OUTPATIENT
Start: 2021-04-06 | End: 2021-07-06

## 2021-04-06 RX ORDER — FENOFIBRIC ACID 135 MG/1
1 CAPSULE, DELAYED RELEASE ORAL DAILY
Qty: 30 CAPSULE | Refills: 0 | Status: SHIPPED | OUTPATIENT
Start: 2021-04-06 | End: 2021-05-07

## 2021-04-06 NOTE — TELEPHONE ENCOUNTER
Patient has appointment on 4/19/21  He needs refills on his metformin and his fenofibrate   [facility]

## 2021-04-12 ENCOUNTER — RA CDI HCC (OUTPATIENT)
Dept: OTHER | Facility: HOSPITAL | Age: 81
End: 2021-04-12

## 2021-04-12 NOTE — PROGRESS NOTES
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

## 2021-04-19 ENCOUNTER — OFFICE VISIT (OUTPATIENT)
Dept: FAMILY MEDICINE CLINIC | Facility: CLINIC | Age: 81
End: 2021-04-19
Payer: COMMERCIAL

## 2021-04-19 VITALS
RESPIRATION RATE: 16 BRPM | WEIGHT: 233 LBS | HEART RATE: 64 BPM | BODY MASS INDEX: 33.36 KG/M2 | OXYGEN SATURATION: 98 % | TEMPERATURE: 97 F | HEIGHT: 70 IN | DIASTOLIC BLOOD PRESSURE: 70 MMHG | SYSTOLIC BLOOD PRESSURE: 140 MMHG

## 2021-04-19 DIAGNOSIS — E11.9 TYPE 2 DIABETES MELLITUS WITHOUT COMPLICATION, WITH LONG-TERM CURRENT USE OF INSULIN (HCC): ICD-10-CM

## 2021-04-19 DIAGNOSIS — Z00.00 MEDICARE ANNUAL WELLNESS VISIT, SUBSEQUENT: Primary | ICD-10-CM

## 2021-04-19 DIAGNOSIS — N18.30 BENIGN HYPERTENSION WITH CHRONIC KIDNEY DISEASE, STAGE III (HCC): ICD-10-CM

## 2021-04-19 DIAGNOSIS — N18.32 STAGE 3B CHRONIC KIDNEY DISEASE (HCC): ICD-10-CM

## 2021-04-19 DIAGNOSIS — M25.571 CHRONIC PAIN OF RIGHT ANKLE: ICD-10-CM

## 2021-04-19 DIAGNOSIS — E11.69 DIABETES MELLITUS TYPE 2 IN OBESE (HCC): ICD-10-CM

## 2021-04-19 DIAGNOSIS — G89.29 CHRONIC PAIN OF RIGHT ANKLE: ICD-10-CM

## 2021-04-19 DIAGNOSIS — I12.9 BENIGN HYPERTENSION WITH CHRONIC KIDNEY DISEASE, STAGE III (HCC): ICD-10-CM

## 2021-04-19 DIAGNOSIS — Z79.4 TYPE 2 DIABETES MELLITUS WITHOUT COMPLICATION, WITH LONG-TERM CURRENT USE OF INSULIN (HCC): ICD-10-CM

## 2021-04-19 DIAGNOSIS — E66.9 DIABETES MELLITUS TYPE 2 IN OBESE (HCC): ICD-10-CM

## 2021-04-19 PROCEDURE — 99214 OFFICE O/P EST MOD 30 MIN: CPT | Performed by: INTERNAL MEDICINE

## 2021-04-19 PROCEDURE — 1125F AMNT PAIN NOTED PAIN PRSNT: CPT | Performed by: INTERNAL MEDICINE

## 2021-04-19 PROCEDURE — 1036F TOBACCO NON-USER: CPT | Performed by: INTERNAL MEDICINE

## 2021-04-19 PROCEDURE — 3288F FALL RISK ASSESSMENT DOCD: CPT | Performed by: INTERNAL MEDICINE

## 2021-04-19 PROCEDURE — 3725F SCREEN DEPRESSION PERFORMED: CPT | Performed by: INTERNAL MEDICINE

## 2021-04-19 PROCEDURE — 1160F RVW MEDS BY RX/DR IN RCRD: CPT | Performed by: INTERNAL MEDICINE

## 2021-04-19 PROCEDURE — 1101F PT FALLS ASSESS-DOCD LE1/YR: CPT | Performed by: INTERNAL MEDICINE

## 2021-04-19 PROCEDURE — 1170F FXNL STATUS ASSESSED: CPT | Performed by: INTERNAL MEDICINE

## 2021-04-19 PROCEDURE — G0439 PPPS, SUBSEQ VISIT: HCPCS | Performed by: INTERNAL MEDICINE

## 2021-04-19 RX ORDER — LINAGLIPTIN 5 MG/1
TABLET, FILM COATED ORAL
Qty: 30 TABLET | Refills: 3 | Status: SHIPPED | OUTPATIENT
Start: 2021-04-19 | End: 2021-09-08

## 2021-04-19 NOTE — PROGRESS NOTES
Patient's shoes and socks removed  Right Foot/Ankle   Right Foot Inspection  Skin Exam: skin normal, skin intact, dry skin, warmth, callus and callus no erythema, no maceration, no abnormal color, no pre-ulcer and no ulcer                          Toe Exam: ROM and strength within normal limits        Left Foot/Ankle  Left Foot Inspection  Skin Exam: skin normal, skin intact, dry skin, warmth and callusno erythema, no maceration, normal color, no pre-ulcer and no ulcer                         Toe Exam: ROM and strength within normal limits

## 2021-04-19 NOTE — ASSESSMENT & PLAN NOTE
Lab Results   Component Value Date    EGFR 40 01/04/2021    EGFR 42 11/03/2020    EGFR 46 09/03/2020    CREATININE 1 61 (H) 01/04/2021    CREATININE 1 55 (H) 11/03/2020    CREATININE 1 45 (H) 09/03/2020   gfr 40-42  Bmp reviewed    recheck bmp

## 2021-04-19 NOTE — ASSESSMENT & PLAN NOTE
Lab Results   Component Value Date    EGFR 40 01/04/2021    EGFR 42 11/03/2020    EGFR 46 09/03/2020    CREATININE 1 61 (H) 01/04/2021    CREATININE 1 55 (H) 11/03/2020    CREATININE 1 45 (H) 09/03/2020     Blood pressure systolic 633 today  Tolerating medicine well    She is also seeing a nephrologist

## 2021-04-19 NOTE — ASSESSMENT & PLAN NOTE
Lab Results   Component Value Date    HGBA1C 6 5 (H) 06/25/2020   check full labs  Patient is here after long time due to COVID-19 epidemic  Tolerating medicine well

## 2021-04-19 NOTE — PROGRESS NOTES
Jodie Caro is here for his Subsequent Wellness visit  Last Medicare Wellness visit information reviewed, patient interviewed, no change since last AWV  Health Risk Assessment:   Patient rates overall health as good  Patient feels that their physical health rating is same  Patient is very satisfied with their life  Eyesight was rated as same  Hearing was rated as same  Patient feels that their emotional and mental health rating is same  Patients states they are never, rarely angry  Patient states they are sometimes unusually tired/fatigued  Pain experienced in the last 7 days has been none  Patient states that he has experienced no weight loss or gain in last 6 months  Depression Screening:   PHQ-2 Score: 0      Fall Risk Screening: In the past year, patient has experienced: no history of falling in past year      Home Safety:  Patient does not have trouble with stairs inside or outside of their home  Patient has working smoke alarms and has working carbon monoxide detector  Home safety hazards include: none  Nutrition:   Current diet is Low Carb, No Added Salt and Limited junk food  Medications:   Patient is currently taking over-the-counter supplements  OTC medications include: see medication list  Patient is able to manage medications       Activities of Daily Living (ADLs)/Instrumental Activities of Daily Living (IADLs):   Walk and transfer into and out of bed and chair?: Yes  Dress and groom yourself?: Yes    Bathe or shower yourself?: Yes    Do your laundry/housekeeping?: Yes  Manage your money, pay your bills and track your expenses?: Yes  Make your own meals?: Yes    Do your own shopping?: Yes    Previous Hospitalizations:   Any hospitalizations or ED visits within the last 12 months?: No      Advance Care Planning:   Living will: Yes    Durable POA for healthcare: No    Advanced directive: Yes      Cognitive Screening:   Provider or family/friend/caregiver concerned regarding cognition?: No    PREVENTIVE SCREENINGS      Cardiovascular Screening:    General: History Lipid Disorder and Screening Current      Diabetes Screening:     General: History Diabetes    Due for: Blood Glucose      Colorectal Cancer Screening:     General: Screening Not Indicated      Prostate Cancer Screening:    General: Screening Not Indicated      Osteoporosis Screening:    General: Screening Not Indicated      Abdominal Aortic Aneurysm (AAA) Screening:        General: Screening Not Indicated      Lung Cancer Screening:     General: Screening Not Indicated      Hepatitis C Screening:    General: Screening Current    Screening, Brief Intervention, and Referral to Treatment (SBIRT)    Screening  Typical number of drinks in a day: 1  Typical number of drinks in a week: 1  Interpretation: Low risk drinking behavior  AUDIT-C Screenin) How often did you have a drink containing alcohol in the past year? 2 to 4 times a month  2) How many drinks did you have on a typical day when you were drinking in the past year? 1 to 2  3) How often did you have 6 or more drinks on one occasion in the past year? never    AUDIT-C Score: 2  Interpretation: Score 0-3 (male): Negative screen for alcohol misuse    Single Item Drug Screening:  How often have you used an illegal drug (including marijuana) or a prescription medication for non-medical reasons in the past year? never    Single Item Drug Screen Score: 0  Interpretation: Negative screen for possible drug use disorder    Brief Intervention  Alcohol & drug use screenings were reviewed  No concerns regarding substance use disorder identified  Other Counseling Topics:   Car/seat belt/driving safety, skin self-exam, sunscreen and calcium and vitamin D intake and regular weightbearing exercise

## 2021-04-19 NOTE — PROGRESS NOTES
Assessment/Plan:         Problem List Items Addressed This Visit        Endocrine    Diabetes mellitus type 2 in obese [facility])       Lab Results   Component Value Date    HGBA1C 6 5 (H) 06/25/2020   check full labs  Patient is here after long time due to COVID-19 epidemic  Tolerating medicine well  Relevant Orders    CBC and differential    Comprehensive metabolic panel    TSH, 3rd generation    HEMOGLOBIN A1C W/ EAG ESTIMATION    Lipid panel       Cardiovascular and Mediastinum    Benign hypertension with chronic kidney disease, stage III     Lab Results   Component Value Date    EGFR 40 01/04/2021    EGFR 42 11/03/2020    EGFR 46 09/03/2020    CREATININE 1 61 (H) 01/04/2021    CREATININE 1 55 (H) 11/03/2020    CREATININE 1 45 (H) 09/03/2020     Blood pressure systolic 930 today  Tolerating medicine well  She is also seeing a nephrologist             Genitourinary    CKD (chronic kidney disease), stage III     Lab Results   Component Value Date    EGFR 40 01/04/2021    EGFR 42 11/03/2020    EGFR 46 09/03/2020    CREATININE 1 61 (H) 01/04/2021    CREATININE 1 55 (H) 11/03/2020    CREATININE 1 45 (H) 09/03/2020   gfr 40-42  Bmp reviewed    recheck bmp            Other    Medicare annual wellness visit, subsequent - Primary      Other Visit Diagnoses     Chronic pain of right ankle        Relevant Orders    Ambulatory referral to Podiatry            Subjective:      Patient ID: Danny Ward is a 80 y o  male  1  Dm-2-  Tolerating current medications okay without any significant side effects  No hypoglycemia symptoms or episodes  No polyuria or polydipsia  He lost few more lb with exercise and diet  No numbness or tingling  No change in the vision  No chest pain or dyspnea  2  htn-  Systolic blood pressure today is 140  He is on multiple blood pressure medications  He is also seeing a nephrologist   No headache dizziness or lightheadedness    He does have chronic lower extremity edema due to venous insufficiency  No orthopnea  No leg pain  No palpitations  3  Dyslipidemia-  Tolerating medicine well  No claudication pain  No muscle cramps or myalgia  No chest pain  He does have diabetes  The following portions of the patient's history were reviewed and updated as appropriate:   Past Medical History:  He has a past medical history of BPH (benign prostatic hyperplasia), Diabetes mellitus ([facility] ), Diverticulosis, History of cardiac cath (2005), Hyperglycemia, Hyperlipidemia, Hypertension, Kidney stone, Macular degeneration, Obesity, LEONORA on CPAP, SBO (small bowel obstruction) ([facility] ), and Swollen ankles  ,  _______________________________________________________________________  Medical Problems:  does not have any pertinent problems on file ,  _______________________________________________________________________  Past Surgical History:   has a past surgical history that includes Tonsillectomy; Hernia repair; Retinal detachment surgery; and Colonoscopy (2007)  ,  _______________________________________________________________________  Family History:  family history includes COPD in his mother; Emphysema in his mother; Lung cancer in his father ,  _______________________________________________________________________  Social History:   reports that he has never smoked  He has never used smokeless tobacco  He reports current alcohol use  He reports that he does not use drugs  ,  _______________________________________________________________________  Allergies:  has No Known Allergies     _______________________________________________________________________  Current Outpatient Medications   Medication Sig Dispense Refill    aspirin 325 mg tablet Take 325 mg by mouth daily      carvedilol (COREG) 12 5 mg tablet Take 1 tablet (12 5 mg total) by mouth 2 (two) times a day 180 tablet 3    cholecalciferol (VITAMIN D3) 1,000 units tablet Take 3,000 Units by mouth daily      Choline Fenofibrate (Fenofibric Acid) 135 MG CPDR Take 1 capsule (135 mg total) by mouth daily 30 capsule 0    cloNIDine (CATAPRES-TTS-3) 0 3 mg/24 hr PLACE 1 PATCH (0 3 MG TOTAL) ON THE SKIN ONCE A WEEK 4 patch 6    co-enzyme Q-10 30 MG capsule Take 30 mg by mouth daily      GLUCOSAMINE-CHONDROITIN PO Take 1 tablet by mouth daily       losartan-hydrochlorothiazide (HYZAAR) 50-12 5 mg per tablet TAKE 2 TABLETS BY MOUTH EVERY  tablet 0    metFORMIN (GLUCOPHAGE) 500 mg tablet Take 1 tablet (500 mg total) by mouth daily 30 tablet 0    milk thistle 175 MG tablet Take 175 mg by mouth daily      Misc Natural Products (OSTEO BI-FLEX TRIPLE STRENGTH PO) Take by mouth      multivitamin (THERAGRAN) TABS Take 1 tablet by mouth daily      Omega-3 Fatty Acids (FISH OIL) 1,000 mg Take 1,000 mg by mouth daily      pravastatin (PRAVACHOL) 40 mg tablet Take 1 tablet (40 mg total) by mouth daily 90 tablet 1    spironolactone (ALDACTONE) 25 mg tablet Take 1 tablet (25 mg total) by mouth every other day 45 tablet 2    thiamine (VITAMIN B1) 100 mg tablet Take 100 mg by mouth daily      Tradjenta 5 MG TABS TAKE 1 TABLET EVERY DAY 30 tablet 3    TURMERIC PO Take by mouth      vitamin E 100 UNIT capsule Take 100 Units by mouth daily      ketoconazole (NIZORAL) 2 % cream Apply topically daily (Patient not taking: Reported on 4/19/2021) 30 g 0     No current facility-administered medications for this visit       _______________________________________________________________________  Review of Systems   Constitutional: Negative for chills, fatigue and fever  HENT: Negative for congestion, nosebleeds and rhinorrhea  Eyes: Negative for discharge and visual disturbance  Respiratory: Negative for cough, chest tightness, shortness of breath and wheezing  Cardiovascular: Negative for chest pain, palpitations and leg swelling  Gastrointestinal: Negative for abdominal distention, abdominal pain, constipation, diarrhea and vomiting  Endocrine: Negative for polydipsia and polyuria  Genitourinary: Negative for difficulty urinating, frequency and hematuria  Musculoskeletal: Negative for arthralgias, back pain and myalgias  Skin: Negative for rash  Allergic/Immunologic: Negative for environmental allergies  Neurological: Negative for dizziness, syncope, weakness, light-headedness and headaches  Hematological: Negative  Psychiatric/Behavioral: Negative for agitation, confusion, decreased concentration, dysphoric mood, sleep disturbance and suicidal ideas  The patient is not nervous/anxious  All other systems reviewed and are negative  Objective:  Vitals:    04/19/21 1452   BP: 140/70   BP Location: Left arm   Patient Position: Sitting   Cuff Size: Large   Pulse: 64   Resp: 16   Temp: (!) 97 °F (36 1 °C)   TempSrc: Temporal   SpO2: 98%   Weight: 106 kg (233 lb)   Height: 5' 10" (1 778 m)     Body mass index is 33 43 kg/m²  Physical Exam  Vitals signs and nursing note reviewed  Constitutional:       General: He is not in acute distress  Appearance: Normal appearance  He is well-developed  HENT:      Head: Normocephalic and atraumatic  Mouth/Throat:      Mouth: Mucous membranes are moist    Eyes:      General:         Right eye: No discharge  Left eye: No discharge  Neck:      Musculoskeletal: Neck supple  Thyroid: No thyromegaly  Cardiovascular:      Rate and Rhythm: Normal rate and regular rhythm  Pulses: no weak pulses          Dorsalis pedis pulses are 1+ on the right side and 1+ on the left side  Heart sounds: Normal heart sounds  No murmur  Pulmonary:      Effort: Pulmonary effort is normal       Breath sounds: Normal breath sounds  No wheezing or rhonchi  Abdominal:      General: Bowel sounds are normal  There is no distension  Palpations: Abdomen is soft  There is no mass  Tenderness: There is no abdominal tenderness     Musculoskeletal:         General: No swelling or tenderness  Right lower leg: No edema  Left lower leg: No edema  Feet:      Right foot:      Skin integrity: No ulcer, skin breakdown, erythema, warmth, callus or dry skin  Left foot:      Skin integrity: No ulcer, skin breakdown, erythema, warmth, callus or dry skin  Lymphadenopathy:      Cervical: No cervical adenopathy  Skin:     General: Skin is warm  Capillary Refill: Capillary refill takes less than 2 seconds  Findings: No erythema  Neurological:      Mental Status: He is alert and oriented to person, place, and time  Psychiatric:         Mood and Affect: Mood normal          Behavior: Behavior normal          Thought Content: Thought content normal        Patient's shoes and socks removed  Right Foot/Ankle   Right Foot Inspection  Skin Exam: skin normal skin not intact, no dry skin, no warmth, no callus, no erythema, no maceration, no abnormal color, no pre-ulcer, no ulcer and no callus                          Toe Exam: ROM and strength within normal limits  Sensory       Monofilament testing: intact  Vascular  Capillary refills: < 3 seconds  The right DP pulse is 1+  Left Foot/Ankle  Left Foot Inspection  Skin Exam: skin normalskin not intact, no dry skin, no warmth, no erythema, no maceration, normal color, no pre-ulcer, no ulcer and no callus                         Toe Exam: ROM and strength within normal limits                   Sensory       Monofilament: intact  Vascular  Capillary refills: < 3 seconds  The left DP pulse is 1+  Assign Risk Category:  No deformity present; No loss of protective sensation;  No weak pulses       Risk: 0

## 2021-05-07 DIAGNOSIS — E11.9 TYPE 2 DIABETES MELLITUS WITHOUT COMPLICATION, WITHOUT LONG-TERM CURRENT USE OF INSULIN (HCC): ICD-10-CM

## 2021-05-07 DIAGNOSIS — E78.2 MIXED HYPERLIPIDEMIA: ICD-10-CM

## 2021-05-07 RX ORDER — FENOFIBRIC ACID 135 MG/1
1 CAPSULE, DELAYED RELEASE ORAL DAILY
Qty: 30 CAPSULE | Refills: 5 | Status: SHIPPED | OUTPATIENT
Start: 2021-05-07 | End: 2021-11-22

## 2021-05-18 DIAGNOSIS — E78.2 MIXED HYPERLIPIDEMIA: ICD-10-CM

## 2021-05-18 RX ORDER — PRAVASTATIN SODIUM 40 MG
TABLET ORAL
Qty: 90 TABLET | Refills: 1 | Status: SHIPPED | OUTPATIENT
Start: 2021-05-18 | End: 2021-11-10

## 2021-07-06 DIAGNOSIS — I12.9 BENIGN HYPERTENSION WITH CHRONIC KIDNEY DISEASE, STAGE III (HCC): ICD-10-CM

## 2021-07-06 DIAGNOSIS — E78.2 MIXED HYPERLIPIDEMIA: ICD-10-CM

## 2021-07-06 DIAGNOSIS — N18.30 BENIGN HYPERTENSION WITH CHRONIC KIDNEY DISEASE, STAGE III (HCC): ICD-10-CM

## 2021-07-06 RX ORDER — LOSARTAN POTASSIUM AND HYDROCHLOROTHIAZIDE 12.5; 5 MG/1; MG/1
TABLET ORAL
Qty: 180 TABLET | Refills: 0 | Status: SHIPPED | OUTPATIENT
Start: 2021-07-06 | End: 2021-09-30

## 2021-07-06 RX ORDER — CARVEDILOL 12.5 MG/1
12.5 TABLET ORAL 2 TIMES DAILY
Qty: 180 TABLET | Refills: 3 | Status: SHIPPED | OUTPATIENT
Start: 2021-07-06 | End: 2022-07-14

## 2021-07-12 ENCOUNTER — RA CDI HCC (OUTPATIENT)
Dept: OTHER | Facility: HOSPITAL | Age: 81
End: 2021-07-12

## 2021-07-12 NOTE — PROGRESS NOTES
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         If this is correct, please document and assess at your next visit July 19th   [facility]  coding opportunities             Chart reviewed, (number of) suggestions sent to provider: 1                  Patients insurance company: [employer] (Medicare Advantage and Commercial)             [facility]  coding opportunities             Chart reviewed, (number of) suggestions sent to provider: 1               Number of suggestions NOT actually used: 1     Patients insurance company: [employer] (Medicare Advantage and Commercial)     Visit status: Patient arrived for their scheduled appointment     Provider never responded to [facility]  coding request

## 2021-07-15 ENCOUNTER — TELEPHONE (OUTPATIENT)
Dept: NEPHROLOGY | Facility: CLINIC | Age: 81
End: 2021-07-15

## 2021-07-15 DIAGNOSIS — N18.30 BENIGN HYPERTENSION WITH CHRONIC KIDNEY DISEASE, STAGE III (HCC): ICD-10-CM

## 2021-07-15 DIAGNOSIS — I12.9 BENIGN HYPERTENSION WITH CHRONIC KIDNEY DISEASE, STAGE III (HCC): ICD-10-CM

## 2021-07-15 RX ORDER — SPIRONOLACTONE 25 MG/1
TABLET ORAL
Qty: 45 TABLET | Refills: 2 | Status: SHIPPED | OUTPATIENT
Start: 2021-07-15 | End: 2022-05-25

## 2021-07-15 NOTE — TELEPHONE ENCOUNTER
Patient called the office and stated that he received a call from [facility] and the [facility]  Patient requested a call back when [facility] was available   Best call back number is [phone]

## 2021-07-16 ENCOUNTER — APPOINTMENT (OUTPATIENT)
Dept: LAB | Facility: CLINIC | Age: 81
End: 2021-07-16
Payer: COMMERCIAL

## 2021-07-16 DIAGNOSIS — E66.9 DIABETES MELLITUS TYPE 2 IN OBESE (HCC): ICD-10-CM

## 2021-07-16 DIAGNOSIS — E11.69 DIABETES MELLITUS TYPE 2 IN OBESE (HCC): ICD-10-CM

## 2021-07-16 DIAGNOSIS — N18.32 STAGE 3B CHRONIC KIDNEY DISEASE (HCC): ICD-10-CM

## 2021-07-16 LAB
25(OH)D3 SERPL-MCNC: 33.9 NG/ML (ref 30–100)
ALBUMIN SERPL BCP-MCNC: 3.2 G/DL (ref 3.5–5)
ALP SERPL-CCNC: 25 U/L (ref 46–116)
ALT SERPL W P-5'-P-CCNC: 25 U/L (ref 12–78)
ANION GAP SERPL CALCULATED.3IONS-SCNC: 8 MMOL/L (ref 4–13)
ANISOCYTOSIS BLD QL SMEAR: PRESENT
AST SERPL W P-5'-P-CCNC: 14 U/L (ref 5–45)
BACTERIA UR QL AUTO: ABNORMAL /HPF
BASOPHILS # BLD MANUAL: 0 THOUSAND/UL (ref 0–0.1)
BASOPHILS NFR MAR MANUAL: 0 % (ref 0–1)
BILIRUB SERPL-MCNC: 0.48 MG/DL (ref 0.2–1)
BILIRUB UR QL STRIP: NEGATIVE
BUN SERPL-MCNC: 27 MG/DL (ref 5–25)
CALCIUM ALBUM COR SERPL-MCNC: 9.5 MG/DL (ref 8.3–10.1)
CALCIUM SERPL-MCNC: 8.9 MG/DL (ref 8.3–10.1)
CHLORIDE SERPL-SCNC: 108 MMOL/L (ref 100–108)
CHOLEST SERPL-MCNC: 160 MG/DL (ref 50–200)
CLARITY UR: ABNORMAL
CO2 SERPL-SCNC: 24 MMOL/L (ref 21–32)
COLOR UR: YELLOW
CREAT SERPL-MCNC: 1.92 MG/DL (ref 0.6–1.3)
CREAT UR-MCNC: 67 MG/DL
EOSINOPHIL # BLD MANUAL: 0.06 THOUSAND/UL (ref 0–0.4)
EOSINOPHIL NFR BLD MANUAL: 1 % (ref 0–6)
ERYTHROCYTE [DISTWIDTH] IN BLOOD BY AUTOMATED COUNT: 13.4 % (ref 11.6–15.1)
EST. AVERAGE GLUCOSE BLD GHB EST-MCNC: 137 MG/DL
GFR SERPL CREATININE-BSD FRML MDRD: 32 ML/MIN/1.73SQ M
GLUCOSE P FAST SERPL-MCNC: 128 MG/DL (ref 65–99)
GLUCOSE UR STRIP-MCNC: NEGATIVE MG/DL
HBA1C MFR BLD: 6.4 %
HCT VFR BLD AUTO: 37.4 % (ref 36.5–49.3)
HDLC SERPL-MCNC: 42 MG/DL
HGB BLD-MCNC: 11.9 G/DL (ref 12–17)
HGB UR QL STRIP.AUTO: NEGATIVE
HYALINE CASTS #/AREA URNS LPF: ABNORMAL /LPF
KETONES UR STRIP-MCNC: NEGATIVE MG/DL
LDLC SERPL CALC-MCNC: 96 MG/DL (ref 0–100)
LEUKOCYTE ESTERASE UR QL STRIP: NEGATIVE
LYMPHOCYTES # BLD AUTO: 2.06 THOUSAND/UL (ref 0.6–4.47)
LYMPHOCYTES # BLD AUTO: 36 % (ref 14–44)
MAGNESIUM SERPL-MCNC: 2 MG/DL (ref 1.6–2.6)
MCH RBC QN AUTO: 30.1 PG (ref 26.8–34.3)
MCHC RBC AUTO-ENTMCNC: 31.8 G/DL (ref 31.4–37.4)
MCV RBC AUTO: 94 FL (ref 82–98)
MONOCYTES # BLD AUTO: 0.23 THOUSAND/UL (ref 0–1.22)
MONOCYTES NFR BLD: 4 % (ref 4–12)
NEUTROPHILS # BLD MANUAL: 3.32 THOUSAND/UL (ref 1.85–7.62)
NEUTS BAND NFR BLD MANUAL: 3 % (ref 0–8)
NEUTS SEG NFR BLD AUTO: 55 % (ref 43–75)
NITRITE UR QL STRIP: NEGATIVE
NON-SQ EPI CELLS URNS QL MICRO: ABNORMAL /HPF
NONHDLC SERPL-MCNC: 118 MG/DL
NRBC BLD AUTO-RTO: 0 /100 WBCS
PH UR STRIP.AUTO: 6 [PH]
PHOSPHATE SERPL-MCNC: 3.1 MG/DL (ref 2.3–4.1)
PLATELET # BLD AUTO: 190 THOUSANDS/UL (ref 149–390)
PLATELET BLD QL SMEAR: ADEQUATE
PMV BLD AUTO: 10.5 FL (ref 8.9–12.7)
POLYCHROMASIA BLD QL SMEAR: PRESENT
POTASSIUM SERPL-SCNC: 3.9 MMOL/L (ref 3.5–5.3)
PROT SERPL-MCNC: 6.9 G/DL (ref 6.4–8.2)
PROT UR STRIP-MCNC: ABNORMAL MG/DL
PROT UR-MCNC: 92 MG/DL
PROT/CREAT UR: 1.37 MG/G{CREAT} (ref 0–0.1)
PTH-INTACT SERPL-MCNC: 19.2 PG/ML (ref 18.4–80.1)
RBC # BLD AUTO: 3.96 MILLION/UL (ref 3.88–5.62)
RBC #/AREA URNS AUTO: ABNORMAL /HPF
RBC MORPH BLD: PRESENT
SODIUM SERPL-SCNC: 140 MMOL/L (ref 136–145)
SP GR UR STRIP.AUTO: 1.01 (ref 1–1.03)
TRIGL SERPL-MCNC: 109 MG/DL
TSH SERPL DL<=0.05 MIU/L-ACNC: 2 UIU/ML (ref 0.36–3.74)
UROBILINOGEN UR QL STRIP.AUTO: 0.2 E.U./DL
VARIANT LYMPHS # BLD AUTO: 1 %
WBC # BLD AUTO: 5.73 THOUSAND/UL (ref 4.31–10.16)
WBC #/AREA URNS AUTO: ABNORMAL /HPF

## 2021-07-16 PROCEDURE — 83970 ASSAY OF PARATHORMONE: CPT

## 2021-07-16 PROCEDURE — 82306 VITAMIN D 25 HYDROXY: CPT

## 2021-07-16 PROCEDURE — 84156 ASSAY OF PROTEIN URINE: CPT

## 2021-07-16 PROCEDURE — 82570 ASSAY OF URINE CREATININE: CPT

## 2021-07-16 PROCEDURE — 36415 COLL VENOUS BLD VENIPUNCTURE: CPT

## 2021-07-16 PROCEDURE — 84100 ASSAY OF PHOSPHORUS: CPT

## 2021-07-16 PROCEDURE — 84443 ASSAY THYROID STIM HORMONE: CPT

## 2021-07-16 PROCEDURE — 81001 URINALYSIS AUTO W/SCOPE: CPT

## 2021-07-16 PROCEDURE — 83735 ASSAY OF MAGNESIUM: CPT

## 2021-07-16 PROCEDURE — 85007 BL SMEAR W/DIFF WBC COUNT: CPT

## 2021-07-16 PROCEDURE — 80053 COMPREHEN METABOLIC PANEL: CPT

## 2021-07-16 PROCEDURE — 85027 COMPLETE CBC AUTOMATED: CPT

## 2021-07-16 PROCEDURE — 83036 HEMOGLOBIN GLYCOSYLATED A1C: CPT

## 2021-07-16 PROCEDURE — 80061 LIPID PANEL: CPT

## 2021-07-19 ENCOUNTER — OFFICE VISIT (OUTPATIENT)
Dept: FAMILY MEDICINE CLINIC | Facility: CLINIC | Age: 81
End: 2021-07-19
Payer: COMMERCIAL

## 2021-07-19 VITALS
OXYGEN SATURATION: 98 % | TEMPERATURE: 97.4 F | WEIGHT: 235.6 LBS | SYSTOLIC BLOOD PRESSURE: 140 MMHG | DIASTOLIC BLOOD PRESSURE: 70 MMHG | BODY MASS INDEX: 33.73 KG/M2 | HEART RATE: 67 BPM | HEIGHT: 70 IN | RESPIRATION RATE: 16 BRPM

## 2021-07-19 DIAGNOSIS — R01.1 HEART MURMUR: ICD-10-CM

## 2021-07-19 DIAGNOSIS — N18.32 STAGE 3B CHRONIC KIDNEY DISEASE (HCC): ICD-10-CM

## 2021-07-19 DIAGNOSIS — E11.69 DIABETES MELLITUS TYPE 2 IN OBESE (HCC): Primary | ICD-10-CM

## 2021-07-19 DIAGNOSIS — I12.9 BENIGN HYPERTENSION WITH CHRONIC KIDNEY DISEASE, STAGE III (HCC): ICD-10-CM

## 2021-07-19 DIAGNOSIS — N18.30 BENIGN HYPERTENSION WITH CHRONIC KIDNEY DISEASE, STAGE III (HCC): ICD-10-CM

## 2021-07-19 DIAGNOSIS — E66.9 DIABETES MELLITUS TYPE 2 IN OBESE (HCC): Primary | ICD-10-CM

## 2021-07-19 PROCEDURE — 1036F TOBACCO NON-USER: CPT | Performed by: INTERNAL MEDICINE

## 2021-07-19 PROCEDURE — 99214 OFFICE O/P EST MOD 30 MIN: CPT | Performed by: INTERNAL MEDICINE

## 2021-07-19 PROCEDURE — 1160F RVW MEDS BY RX/DR IN RCRD: CPT | Performed by: INTERNAL MEDICINE

## 2021-07-19 NOTE — PROGRESS NOTES
Assessment/Plan:         Problem List Items Addressed This Visit        Endocrine    Diabetes mellitus type 2 in obese [facility]) - Primary       Lab Results   Component Value Date    HGBA1C 6 4 (H) 07/16/2021   fbs 128  Labs reviewed  Continue metformin and tradjenta  gfr 32 from 40  Relevant Orders    HEMOGLOBIN A1C W/ EAG ESTIMATION    Basic metabolic panel       Cardiovascular and Mediastinum    Benign hypertension with chronic kidney disease, stage III [facility])     Lab Results   Component Value Date    EGFR 32 07/16/2021    EGFR 40 01/04/2021    EGFR 42 11/03/2020    CREATININE 1 92 (H) 07/16/2021    CREATININE 1 61 (H) 01/04/2021    CREATININE 1 55 (H) 11/03/2020   bmp reviewed  Also seeing nephrologist            Genitourinary    CKD (chronic kidney disease), stage III ([facility] [address] )    Relevant Orders    Ferritin       Other    Heart murmur     Chronic  Asymptomatic  Check baseline 2d echo         Relevant Orders    Echo complete with contrast if indicated    BMI 33 0-33 9,adult            Subjective:      Patient ID: Bud Escobedo is a 80 y o  male  1  Dm-2-  A1c 6 4  Fasting blood sugar 128  No polyuria or polydipsia  Tolerating medicine well  No abdominal pain nausea or vomiting  No numbness or tingling  No chest pain  No claudication pain  2  htn- systolic blood pressure 101 which is not bad for him  He is on multiple medications  No headache dizziness or lightheadedness  No syncope  No increased edema  No orthopnea  3  ckd-3- GFR went down to 32 from 40  He is regularly seen by a nephrologist   He had recent blood work done from his nephrologist   No increased edema  No dyspnea  4  Fatigue-  He is complaining of feeling tired lately  No exertional dyspnea or chest pain  No dizziness  No black stool  His hemoglobin was 11 4 recently  His thyroid level and vitamin-D level are normal   Mentally he denies any change with the mental stress level    He thinks his sleep machine is working well       The following portions of the patient's history were reviewed and updated as appropriate:   Past Medical History:  He has a past medical history of BPH (benign prostatic hyperplasia), Diabetes mellitus ([address] ), Diverticulosis, History of cardiac cath (2005), Hyperglycemia, Hyperlipidemia, Hypertension, Kidney stone, Macular degeneration, Obesity, LEONORA on CPAP, SBO (small bowel obstruction) ([address] ), and Swollen ankles  ,  _______________________________________________________________________  Medical Problems:  does not have any pertinent problems on file ,  _______________________________________________________________________  Past Surgical History:   has a past surgical history that includes Tonsillectomy; Hernia repair; Retinal detachment surgery; and Colonoscopy (2007)  ,  _______________________________________________________________________  Family History:  family history includes COPD in his mother; Emphysema in his mother; Lung cancer in his father ,  _______________________________________________________________________  Social History:   reports that he has never smoked  He has never used smokeless tobacco  He reports current alcohol use  He reports that he does not use drugs  ,  _______________________________________________________________________  Allergies:  has No Known Allergies     _______________________________________________________________________  Current Outpatient Medications   Medication Sig Dispense Refill    aspirin 325 mg tablet Take 325 mg by mouth daily      carvedilol (COREG) 12 5 mg tablet TAKE 1 TABLET (12 5 MG TOTAL) BY MOUTH 2 (TWO) TIMES A  tablet 3    cholecalciferol (VITAMIN D3) 1,000 units tablet Take 3,000 Units by mouth daily      Choline Fenofibrate (Fenofibric Acid) 135 MG CPDR TAKE 1 CAPSULE (135 MG TOTAL) BY MOUTH DAILY 30 capsule 5    cloNIDine (CATAPRES-TTS-3) 0 3 mg/24 hr PLACE 1 PATCH (0 3 MG TOTAL) ON THE SKIN ONCE A WEEK 4 patch 6    co-enzyme Q-10 30 MG capsule Take 30 mg by mouth daily      GLUCOSAMINE-CHONDROITIN PO Take 1 tablet by mouth daily       losartan-hydrochlorothiazide (HYZAAR) 50-12 5 mg per tablet TAKE 2 TABLETS BY MOUTH EVERY  tablet 0    metFORMIN (GLUCOPHAGE) 500 mg tablet TAKE 1 TABLET BY MOUTH EVERY DAY 30 tablet 5    milk thistle 175 MG tablet Take 175 mg by mouth daily      Misc Natural Products (OSTEO BI-FLEX TRIPLE STRENGTH PO) Take by mouth      multivitamin (THERAGRAN) TABS Take 1 tablet by mouth daily      Omega-3 Fatty Acids (FISH OIL) 1,000 mg Take 1,000 mg by mouth daily      pravastatin (PRAVACHOL) 40 mg tablet TAKE 1 TABLET BY MOUTH EVERY DAY 90 tablet 1    spironolactone (ALDACTONE) 25 mg tablet TAKE 1 TABLET BY MOUTH EVERY OTHER DAY 45 tablet 2    thiamine (VITAMIN B1) 100 mg tablet Take 100 mg by mouth daily      Tradjenta 5 MG TABS TAKE 1 TABLET EVERY DAY 30 tablet 3    TURMERIC PO Take by mouth      vitamin E 100 UNIT capsule Take 100 Units by mouth daily      ketoconazole (NIZORAL) 2 % cream Apply topically daily (Patient not taking: Reported on 4/19/2021) 30 g 0     No current facility-administered medications for this visit      _______________________________________________________________________  Review of Systems   Constitutional: Negative for chills, fatigue and fever  HENT: Negative for congestion, nosebleeds and rhinorrhea  Eyes: Negative for discharge and visual disturbance  Respiratory: Negative for cough, chest tightness, shortness of breath and wheezing  Cardiovascular: Positive for leg swelling (ankle)  Negative for chest pain and palpitations  Gastrointestinal: Negative for abdominal distention, abdominal pain, constipation, diarrhea and vomiting  Endocrine: Negative for polydipsia and polyuria  Genitourinary: Negative for difficulty urinating, frequency and hematuria  Musculoskeletal: Negative for arthralgias, back pain and myalgias  Skin: Negative for rash  Allergic/Immunologic: Negative for environmental allergies  Neurological: Negative for dizziness, syncope, weakness, light-headedness and headaches  Hematological: Negative  Psychiatric/Behavioral: Negative for agitation, confusion, decreased concentration, dysphoric mood, sleep disturbance and suicidal ideas  The patient is not nervous/anxious  All other systems reviewed and are negative  Objective:  Vitals:    07/19/21 1340   BP: 140/70   BP Location: Left arm   Patient Position: Sitting   Cuff Size: Large   Pulse: 67   Resp: 16   Temp: (!) 97 4 °F (36 3 °C)   TempSrc: Temporal   SpO2: 98%   Weight: 107 kg (235 lb 9 6 oz)   Height: 5' 10" (1 778 m)     Body mass index is 33 81 kg/m²  Physical Exam  Vitals and nursing note reviewed  Constitutional:       General: He is not in acute distress  Appearance: Normal appearance  He is well-developed  He is obese  He is not ill-appearing  HENT:      Head: Normocephalic and atraumatic  Mouth/Throat:      Mouth: Mucous membranes are moist    Eyes:      General:         Right eye: No discharge  Left eye: No discharge  Neck:      Thyroid: No thyromegaly  Cardiovascular:      Rate and Rhythm: Normal rate and regular rhythm  Pulses: no weak pulses          Dorsalis pedis pulses are 1+ on the right side and 1+ on the left side  Heart sounds: Murmur heard  Pulmonary:      Effort: Pulmonary effort is normal       Breath sounds: Normal breath sounds  No wheezing or rhonchi  Abdominal:      General: Bowel sounds are normal  There is no distension  Palpations: Abdomen is soft  There is no mass  Tenderness: There is no abdominal tenderness  Musculoskeletal:         General: No swelling or tenderness  Cervical back: Neck supple  Right lower leg: Edema (chronic mild) present  Left lower leg: Edema present     Feet:      Right foot:      Skin integrity: No ulcer, skin breakdown, erythema, warmth, callus or dry skin  Left foot:      Skin integrity: No ulcer, skin breakdown, erythema, warmth, callus or dry skin  Lymphadenopathy:      Cervical: No cervical adenopathy  Skin:     General: Skin is warm  Capillary Refill: Capillary refill takes less than 2 seconds  Findings: No erythema  Neurological:      Mental Status: He is alert and oriented to person, place, and time  Psychiatric:         Mood and Affect: Mood normal          Behavior: Behavior normal          Thought Content: Thought content normal        Patient's shoes and socks removed  Right Foot/Ankle   Right Foot Inspection  Skin Exam: skin normal skin not intact, no dry skin, no warmth, no callus, no erythema, no maceration, no abnormal color, no pre-ulcer, no ulcer and no callus                          Toe Exam: ROM and strength within normal limits  Sensory       Monofilament testing: intact  Vascular  Capillary refills: < 3 seconds  The right DP pulse is 1+  Left Foot/Ankle  Left Foot Inspection  Skin Exam: skin normalskin not intact, no dry skin, no warmth, no erythema, no maceration, normal color, no pre-ulcer, no ulcer and no callus                         Toe Exam: ROM and strength within normal limits                   Sensory       Monofilament: intact  Vascular  Capillary refills: < 3 seconds  The left DP pulse is 1+  Assign Risk Category:  No deformity present; No loss of protective sensation; No weak pulses       Risk: 0     BMI Counseling: Body mass index is 33 81 kg/m²  The BMI is above normal  Nutrition recommendations include reducing portion sizes

## 2021-07-19 NOTE — ASSESSMENT & PLAN NOTE
Lab Results   Component Value Date    HGBA1C 6 4 (H) 07/16/2021   fbs 128  Labs reviewed  Continue metformin and tradjenta  gfr 32 from 40

## 2021-07-19 NOTE — ASSESSMENT & PLAN NOTE
Lab Results   Component Value Date    EGFR 32 07/16/2021    EGFR 40 01/04/2021    EGFR 42 11/03/2020    CREATININE 1 92 (H) 07/16/2021    CREATININE 1 61 (H) 01/04/2021    CREATININE 1 55 (H) 11/03/2020   bmp reviewed   Also seeing nephrologist

## 2021-07-19 NOTE — PATIENT INSTRUCTIONS
Low Fat Diet   AMBULATORY CARE:   A low-fat diet  is an eating plan that is low in total fat, unhealthy fat, and cholesterol  You may need to follow a low-fat diet if you have trouble digesting or absorbing fat  You may also need to follow this diet if you have high cholesterol  You can also lower your cholesterol by increasing the amount of fiber in your diet  Soluble fiber is a type of fiber that helps to decrease cholesterol levels  Different types of fat in food:   · Limit unhealthy fats  A diet that is high in cholesterol, saturated fat, and trans fat may cause unhealthy cholesterol levels  Unhealthy cholesterol levels increase your risk of heart disease  ? Cholesterol:  Limit intake of cholesterol to less than 200 mg per day  Cholesterol is found in meat, eggs, and dairy  ? Saturated fat:  Limit saturated fat to less than 7% of your total daily calories  Ask your dietitian how many calories you need each day  Saturated fat is found in butter, cheese, ice cream, whole milk, and palm oil  Saturated fat is also found in meat, such as beef, pork, chicken skin, and processed meats  Processed meats include sausage, hot dogs, and bologna  ? Trans fat:  Avoid trans fat as much as possible  Trans fat is used in fried and baked foods  Foods that say trans fat free on the label may still have up to 0 5 grams of trans fat per serving  · Include healthy fats  Replace foods that are high in saturated and trans fat with foods high in healthy fats  This may help to decrease high cholesterol levels  ? Monounsaturated fats: These are found in avocados, nuts, and vegetable oils, such as olive, canola, and sunflower oil  ? Polyunsaturated fats: These can be found in vegetable oils, such as soybean or corn oil  Omega-3 fats can help to decrease the risk of heart disease  Omega-3 fats are found in fish, such as salmon, herring, trout, and tuna   Omega-3 fats can also be found in plant foods, such as walnuts, flaxseed, soybeans, and canola oil  Foods to limit or avoid:   · Grains:      ? Snacks that are made with partially hydrogenated oils, such as chips, regular crackers, and butter-flavored popcorn    ? High-fat baked goods, such as biscuits, croissants, doughnuts, pies, cookies, and pastries    · Dairy:      ? Whole milk, 2% milk, and yogurt and ice cream made with whole milk    ? Half and half creamer, heavy cream, and whipping cream    ? Cheese, cream cheese, and sour cream    · Meats and proteins:      ? High-fat cuts of meat (T-bone steak, regular hamburger, and ribs)    ? Fried meat, poultry (turkey and chicken), and fish    ? Poultry (chicken and turkey) with skin    ? Cold cuts (salami or bologna), hot dogs, gomes, and sausage    ? Whole eggs and egg yolks    · Vegetables and fruits with added fat:      ? Fried vegetables or vegetables in butter or high-fat sauces, such as cream or cheese sauces    ? Fried fruit or fruit served with butter or cream    · Fats:      ? Butter, stick margarine, and shortening    ? Coconut, palm oil, and palm kernel oil    Foods to include:   · Grains:      ? Whole-grain breads, cereals, pasta, and brown rice    ? Low-fat crackers and pretzels    · Vegetables and fruits:      ? Fresh, frozen, or canned vegetables (no salt or low-sodium)    ? Fresh, frozen, dried, or canned fruit (canned in light syrup or fruit juice)    ? Avocado    · Low-fat dairy products:      ? Nonfat (skim) or 1% milk    ? Nonfat or low-fat cheese, yogurt, and cottage cheese    · Meats and proteins:      ? Chicken or turkey with no skin    ? Baked or broiled fish    ? Lean beef and pork (loin, round, extra lean hamburger)    ? Beans and peas, unsalted nuts, soy products    ? Egg whites and substitutes    ? Seeds and nuts    · Fats:      ? Unsaturated oil, such as canola, olive, peanut, soybean, or sunflower oil    ? Soft or liquid margarine and vegetable oil spread    ?  Low-fat salad dressing    Other ways to decrease fat:   · Read food labels before you buy foods  Choose foods that have less than 30% of calories from fat  Choose low-fat or fat-free dairy products  Remember that fat free does not mean calorie free  These foods still contain calories, and too many calories can lead to weight gain  · Trim fat from meat and avoid fried food  Trim all visible fat from meat before you cook it  Remove the skin from poultry  Do not vargas meat, fish, or poultry  Bake, roast, boil, or broil these foods instead  Avoid fried foods  Eat a baked potato instead of [place] fries  Steam vegetables instead of sautéing them in butter  · Add less fat to foods  Use imitation gomes bits on salads and baked potatoes instead of regular gomes bits  Use fat-free or low-fat salad dressings instead of regular dressings  Use low-fat or nonfat butter-flavored topping instead of regular butter or margarine on popcorn and other foods  Ways to decrease fat in recipes:  Replace high-fat ingredients with low-fat or nonfat ones  This may cause baked goods to be drier than usual  You may need to use nonfat cooking spray on pans to prevent food from sticking  You also may need to change the amount of other ingredients, such as water, in the recipe  Try the following:  · Use low-fat or light margarine instead of regular margarine or shortening  · Use lean ground turkey breast or chicken, or lean ground beef (less than 5% fat) instead of hamburger  · Add 1 teaspoon of canola oil to 8 ounces of skim milk instead of using cream or half and half  · Use grated zucchini, carrots, or apples in breads instead of coconut  · Use blenderized, low-fat cottage cheese, plain tofu, or low-fat ricotta cheese instead of cream cheese  · Use 1 egg white and 1 teaspoon of canola oil, or use ¼ cup (2 ounces) of fat-free egg substitute instead of a whole egg       · Replace half of the oil that is called for in a recipe with applesauce when you bake  Use 3 tablespoons of cocoa powder and 1 tablespoon of canola oil instead of a square of baking chocolate  How to increase fiber:  Eat enough high-fiber foods to get 20 to 30 grams of fiber every day  Slowly increase your fiber intake to avoid stomach cramps, gas, and other problems  · Eat 3 ounces of whole-grain foods each day  An ounce is about 1 slice of bread  Eat whole-grain breads, such as whole-wheat bread  Whole wheat, whole-wheat flour, or other whole grains should be listed as the first ingredient on the food label  Replace white flour with whole-grain flour or use half of each in recipes  Whole-grain flour is heavier than white flour, so you may have to add more yeast or baking powder  · Eat a high-fiber cereal for breakfast   Oatmeal is a good source of soluble fiber  Look for cereals that have bran or fiber in the name  Choose whole-grain products, such as brown rice, barley, and whole-wheat pasta  · Eat more beans, peas, and lentils  For example, add beans to soups or salads  Eat at least 5 cups of fruits and vegetables each day  Eat fruits and vegetables with the peel because the peel is high in fiber  © Copyright [address] Information is for End User's use only and may not be sold, redistributed or otherwise used for commercial purposes  All illustrations and images included in CareNotes® are the copyrighted property of A D A M , Inc  or [address] [place] [address]  The above information is an  only  It is not intended as medical advice for individual conditions or treatments  Talk to your doctor, nurse or pharmacist before following any medical regimen to see if it is safe and effective for you  Heart Healthy Diet   AMBULATORY CARE:   A heart healthy diet  is an eating plan low in unhealthy fats and sodium (salt)  The plan is high in healthy fats and fiber   A heart healthy diet helps improve your cholesterol levels and lowers your risk for heart disease and stroke  A dietitian will teach you how to read and understand food labels  Heart healthy diet guidelines to follow:   · Choose foods that contain healthy fats  ? Unsaturated fats  include monounsaturated and polyunsaturated fats  Unsaturated fat is found in foods such as soybean, canola, olive, corn, and safflower oils  It is also found in soft tub margarine that is made with liquid vegetable oil  ? Omega-3 fat  is found in certain fish, such as salmon, tuna, and trout, and in walnuts and flaxseed  Eat fish high in omega-3 fats at least 2 times a week  · Get 20 to 30 grams of fiber each day  Fruits, vegetables, whole-grain foods, and legumes (cooked beans) are good sources of fiber  · Limit or do not have unhealthy fats  ? Cholesterol  is found in animal foods, such as eggs and lobster, and in dairy products made from whole milk  Limit cholesterol to less than 200 mg each day  ? Saturated fat  is found in meats, such as gomes and hamburger  It is also found in chicken or turkey skin, whole milk, and butter  Limit saturated fat to less than 7% of your total daily calories  ? Trans fat  is found in packaged foods, such as potato chips and cookies  It is also in hard margarine, some fried foods, and shortening  Do not eat foods that contain trans fats  · Limit sodium as directed  You may be told to limit sodium to 2,000 to 2,300 mg each day  Choose low-sodium or no-salt-added foods  Add little or no salt to food you prepare  Use herbs and spices in place of salt  Include the following in your heart healthy plan:  Ask your dietitian or healthcare provider how many servings to have from each of the following food groups:  · Grains:      ? Whole-wheat breads, cereals, and pastas, and brown rice    ? Low-fat, low-sodium crackers and chips    · Vegetables:      ? Broccoli, green beans, green peas, and spinach    ? Collards, kale, and lima beans    ?  Carrots, sweet potatoes, tomatoes, and peppers    ? Canned vegetables with no salt added    · Fruits:      ? Bananas, peaches, pears, and pineapple    ? Grapes, raisins, and dates    ? Oranges, tangerines, grapefruit, orange juice, and grapefruit juice    ? Apricots, mangoes, melons, and papaya    ? Raspberries and strawberries    ? Canned fruit with no added sugar    · Low-fat dairy:      ? Nonfat (skim) milk, 1% milk, and low-fat almond, cashew, or soy milks fortified with calcium    ? Low-fat cheese, regular or frozen yogurt, and cottage cheese    · Meats and proteins:      ? Lean cuts of beef and pork (loin, leg, round), skinless chicken and turkey    ? Legumes, soy products, egg whites, or nuts    Limit or do not include the following in your heart healthy plan:   · Unhealthy fats and oils:      ? Whole or 2% milk, cream cheese, sour cream, or cheese    ? High-fat cuts of beef (T-bone steaks, ribs), chicken or turkey with skin, and organ meats such as liver    ? Butter, stick margarine, shortening, and cooking oils such as coconut or palm oil    · Foods and liquids high in sodium:      ? Packaged foods, such as frozen dinners, cookies, macaroni and cheese, and cereals with more than 300 mg of sodium per serving    ? Vegetables with added sodium, such as instant potatoes, vegetables with added sauces, or regular canned vegetables    ? Cured or smoked meats, such as hot dogs, gomes, and sausage    ? High-sodium ketchup, barbecue sauce, salad dressing, pickles, olives, soy sauce, or miso    · Foods and liquids high in sugar:      ? Candy, cake, cookies, pies, or doughnuts    ? Soft drinks (soda), sports drinks, or sweetened tea    ? Canned or dry mixes for cakes, soups, sauces, or gravies    Other healthy heart guidelines:   · Do not smoke  Nicotine and other chemicals in cigarettes and cigars can cause lung and heart damage  Ask your healthcare provider for information if you currently smoke and need help to quit  E-cigarettes or smokeless tobacco still contain nicotine  Talk to your healthcare provider before you use these products  · Limit or do not drink alcohol as directed  Alcohol can damage your heart and raise your blood pressure  Your healthcare provider may give you specific daily and weekly limits  The general recommended limit is 1 drink a day for women 21 or older and for men 72 or older  Do not have more than 3 drinks in a day or 7 in a week  The recommended limit is 2 drinks a day for men 24to 59years of age  Do not have more than 4 drinks in a day or 14 in a week  A drink of alcohol is 12 ounces of beer, 5 ounces of wine, or 1½ ounces of liquor  · Exercise regularly  Exercise can help you maintain a healthy weight and improve your blood pressure and cholesterol levels  Regular exercise can also decrease your risk for heart problems  Ask your healthcare provider about the best exercise plan for you  Do not start an exercise program without asking your healthcare provider  Follow up with your doctor or cardiologist as directed:  Write down your questions so you remember to ask them during your visits  © Copyright [address] [place] [address] Drive Information is for End User's use only and may not be sold, redistributed or otherwise used for commercial purposes  All illustrations and images included in CareNotes® are the copyrighted property of [employer] D A M , Inc  or [address] [place] [address]  The above information is an  only  It is not intended as medical advice for individual conditions or treatments  Talk to your doctor, nurse or pharmacist before following any medical regimen to see if it is safe and effective for you  Calorie Counting Diet   WHAT YOU NEED TO KNOW:   What is a calorie counting diet? It is a meal plan based on counting calories each day to reach a healthy body weight  You will need to eat fewer calories if you are trying to lose weight   Weight loss may decrease your risk for certain health problems or improve your health if you have health problems  Some of these health problems include heart disease, high blood pressure, and diabetes  What foods should I avoid? Your dietitian will tell you if you need to avoid certain foods based on your body weight and health condition  You may need to avoid high-fat foods if you are at risk for or have heart disease  You may need to eat fewer foods from the breads and starches food group if you have diabetes  How many calories are in foods? The following is a list of foods and drinks with the approximate number of calories in each  Check the food label to find the exact number of calories  A dietitian can tell you how many calories you should have from each food group each day  · Carbohydrate:      ? ½ of a 3-inch bagel, 1 slice of bread, or ½ of a hamburger bun or hot dog bun (80)    ? 1 (8-inch) flour tortilla or ½ cup of cooked rice (100)    ? 1 (6-inch) corn tortilla (80)    ? 1 (6-inch) pancake or 1 cup of bran flakes cereal (110)    ? ½ cup of cooked cereal (80)    ? ½ cup of cooked pasta (85)    ? 1 ounce of pretzels (100)    ? 3 cups of air-popped popcorn without butter or oil (80)    · Dairy:      ? 1 cup of skim or 1% milk (90)    ? 1 cup of 2% milk (120)    ? 1 cup of whole milk (160)    ? 1 cup of 2% chocolate milk (220)    ? 1 ounce of low-fat cheese with 3 grams of fat per ounce (70)    ? 1 ounce of cheddar cheese (114)    ? ½ cup of 1% fat cottage cheese (80)    ? 1 cup of plain or sugar-free, fat-free yogurt (90)    · Protein foods:      ? 3 ounces of fish (not breaded or fried) (95)    ? 3 ounces of breaded, fried fish (195)    ? ¾ cup of tuna canned in water (105)    ? 3 ounces of chicken breast without skin (105)    ? 1 fried chicken breast with skin (350)    ? ¼ cup of fat free egg substitute (40)    ? 1 large egg (75)    ? 3 ounces of lean beef or pork (165)    ? 3 ounces of fried pork chop or ham (185)    ?  ½ cup of cooked dried beans, such as kidney, colmenares, lentils, or navy (115)    ? 3 ounces of bologna or lunch meat (225)    ? 2 links of breakfast sausage (140)    · Vegetables:      ? ½ cup of sliced mushrooms (10)    ? 1 cup of salad greens, such as lettuce, spinach, or david (15)    ? ½ cup of steamed asparagus (20)    ? ½ cup of cooked summer squash, zucchini squash, or green or wax beans (25)    ? 1 cup of broccoli or cauliflower florets, or 1 medium tomato (25)    ? 1 large raw carrot or ½ cup of cooked carrots (40)    ? ? of a medium cucumber or 1 stalk of celery (5)    ? 1 small baked potato (160)    ? 1 cup of breaded, fried vegetables (230)    · Fruit:      ? 1 (6-inch) banana (55)     ? ½ of a 4-inch grapefruit (55)    ? 15 grapes (60)    ? 1 medium orange or apple (70)    ? 1 large peach (65)    ? 1 cup of fresh pineapple chunks (75)    ? 1 cup of melon cubes (50)    ? 1¼ cups of whole strawberries (45)    ? ½ cup of fruit canned in juice (55)    ? ½ cup of fruit canned in heavy syrup (110)    ? ? cup of raisins (130)    ? ½ cup of unsweetened fruit juice (60)    ? ½ cup of grape, cranberry, or prune juice (90)    · Fat:      ? 10 peanuts or 2 teaspoons of peanut butter (55)    ? 2 tablespoons of avocado or 1 tablespoon of regular salad dressing (45)    ? 2 slices of gomes (90)    ? 1 teaspoon of oil, such as safflower, canola, corn, or olive oil (45)    ? 2 teaspoons of low-fat margarine, or 1 tablespoon of low-fat mayonnaise (50)    ? 1 teaspoon of regular margarine (40)    ? 1 tablespoon of regular mayonnaise (135)    ? 1 tablespoon of cream cheese or 2 tablespoons of low-fat cream cheese (45)    ? 2 tablespoons of vegetable shortening (215)    · Dessert and sweets:      ? 8 animal crackers or 5 vanilla wafers (80)    ? 1 frozen fruit juice bar (80)    ? ½ cup of ice milk or low-fat frozen yogurt (90)    ? ½ cup of sherbet or sorbet (125)    ? ½ cup of sugar-free pudding or custard (60)    ?  ½ cup of ice cream (140)    ? ½ cup of pudding or custard (175)    ? 1 (2-inch) square chocolate brownie (185)    · Combination foods:      ? Bean burrito made with an 8-inch tortilla, without cheese (275)    ? Chicken breast sandwich with lettuce and tomato (325)    ? 1 cup of chicken noodle soup (60)    ? 1 beef taco (175)    ? Regular hamburger with lettuce and tomato (310)    ? Regular cheeseburger with lettuce and tomato (410)     ? ¼ of a 12-inch cheese pizza (280)    ? Fried fish sandwich with lettuce and tomato (425)    ? Hot dog and bun (275)    ? 1½ cups of macaroni and cheese (310)    ? Taco salad with a fried tortilla shell (870)    · Low-calorie foods:      ? 1 tablespoon of ketchup or 1 tablespoon of fat free sour cream (15)    ? 1 teaspoon of mustard (5)    ? ¼ cup of salsa (20)    ? 1 large dill pickle (15)    ? 1 tablespoon of fat free salad dressing (10)    ? 2 teaspoons of low-sugar, light jam or jelly, or 1 tablespoon of sugar-free syrup (15)    ? 1 sugar-free popsicle (15)    ? 1 cup of club soda, seltzer water, or diet soda (0)    CARE AGREEMENT:   You have the right to help plan your care  Discuss treatment options with your healthcare provider to decide what care you want to receive  You always have the right to refuse treatment  The above information is an  only  It is not intended as medical advice for individual conditions or treatments  Talk to your doctor, nurse or pharmacist before following any medical regimen to see if it is safe and effective for you  © Copyright [address] Information is for End User's use only and may not be sold, redistributed or otherwise used for commercial purposes   All illustrations and images included in CareNotes® are the copyrighted property of A D A M , Inc  or [facility] Click [employer] Save

## 2021-07-20 ENCOUNTER — TELEPHONE (OUTPATIENT)
Dept: NEPHROLOGY | Facility: CLINIC | Age: 81
End: 2021-07-20

## 2021-07-20 DIAGNOSIS — N18.30 BENIGN HYPERTENSION WITH CHRONIC KIDNEY DISEASE, STAGE III (HCC): Primary | ICD-10-CM

## 2021-07-20 DIAGNOSIS — I12.9 BENIGN HYPERTENSION WITH CHRONIC KIDNEY DISEASE, STAGE III (HCC): Primary | ICD-10-CM

## 2021-07-20 NOTE — TELEPHONE ENCOUNTER
I called the patient and we spoke over the phone  Recent laboratory data were reviewed  Lab Results   Component Value Date    SODIUM 140 07/16/2021    K 3 9 07/16/2021     07/16/2021    CO2 24 07/16/2021    BUN 27 (H) 07/16/2021    CREATININE 1 92 (H) 07/16/2021    GLUC 133 01/16/2020    CALCIUM 8 9 07/16/2021     Creatinine is slightly higher than Jan 2021 readings  Patient is not taking any NSAIDs  BP meds include Losartan HCT 50/12 5 mg BID, Clonidine, Carvedilol, and Spironolactone  Plan:   No changes for now   Advised to stay hydrated   Repeat BMP in 4 weeks

## 2021-09-05 DIAGNOSIS — I10 ESSENTIAL HYPERTENSION: ICD-10-CM

## 2021-09-07 RX ORDER — CLONIDINE 0.3 MG/24H
1 PATCH, EXTENDED RELEASE TRANSDERMAL WEEKLY
Qty: 4 PATCH | Refills: 6 | Status: SHIPPED | OUTPATIENT
Start: 2021-09-07 | End: 2022-04-21

## 2021-09-08 DIAGNOSIS — E11.9 TYPE 2 DIABETES MELLITUS WITHOUT COMPLICATION, WITH LONG-TERM CURRENT USE OF INSULIN (HCC): ICD-10-CM

## 2021-09-08 DIAGNOSIS — Z79.4 TYPE 2 DIABETES MELLITUS WITHOUT COMPLICATION, WITH LONG-TERM CURRENT USE OF INSULIN (HCC): ICD-10-CM

## 2021-09-08 RX ORDER — LINAGLIPTIN 5 MG/1
TABLET, FILM COATED ORAL
Qty: 30 TABLET | Refills: 3 | Status: SHIPPED | OUTPATIENT
Start: 2021-09-08 | End: 2022-05-24 | Stop reason: SDUPTHER

## 2021-09-24 ENCOUNTER — APPOINTMENT (OUTPATIENT)
Dept: LAB | Facility: CLINIC | Age: 81
End: 2021-09-24
Payer: COMMERCIAL

## 2021-09-24 DIAGNOSIS — I12.9 BENIGN HYPERTENSION WITH CHRONIC KIDNEY DISEASE, STAGE III (HCC): ICD-10-CM

## 2021-09-24 DIAGNOSIS — E66.9 DIABETES MELLITUS TYPE 2 IN OBESE (HCC): ICD-10-CM

## 2021-09-24 DIAGNOSIS — E11.69 DIABETES MELLITUS TYPE 2 IN OBESE (HCC): ICD-10-CM

## 2021-09-24 DIAGNOSIS — N18.30 BENIGN HYPERTENSION WITH CHRONIC KIDNEY DISEASE, STAGE III (HCC): ICD-10-CM

## 2021-09-24 DIAGNOSIS — N18.32 STAGE 3B CHRONIC KIDNEY DISEASE (HCC): ICD-10-CM

## 2021-09-24 LAB
ANION GAP SERPL CALCULATED.3IONS-SCNC: 3 MMOL/L (ref 4–13)
BUN SERPL-MCNC: 33 MG/DL (ref 5–25)
CALCIUM SERPL-MCNC: 9.5 MG/DL (ref 8.3–10.1)
CHLORIDE SERPL-SCNC: 111 MMOL/L (ref 100–108)
CO2 SERPL-SCNC: 28 MMOL/L (ref 21–32)
CREAT SERPL-MCNC: 1.82 MG/DL (ref 0.6–1.3)
EST. AVERAGE GLUCOSE BLD GHB EST-MCNC: 134 MG/DL
FERRITIN SERPL-MCNC: 150 NG/ML (ref 8–388)
GFR SERPL CREATININE-BSD FRML MDRD: 35 ML/MIN/1.73SQ M
GLUCOSE P FAST SERPL-MCNC: 132 MG/DL (ref 65–99)
HBA1C MFR BLD: 6.3 %
POTASSIUM SERPL-SCNC: 4.4 MMOL/L (ref 3.5–5.3)
SODIUM SERPL-SCNC: 142 MMOL/L (ref 136–145)

## 2021-09-24 PROCEDURE — 80048 BASIC METABOLIC PNL TOTAL CA: CPT

## 2021-09-24 PROCEDURE — 82728 ASSAY OF FERRITIN: CPT

## 2021-09-24 PROCEDURE — 83036 HEMOGLOBIN GLYCOSYLATED A1C: CPT

## 2021-09-24 PROCEDURE — 36415 COLL VENOUS BLD VENIPUNCTURE: CPT

## 2021-09-27 ENCOUNTER — TELEPHONE (OUTPATIENT)
Dept: FAMILY MEDICINE CLINIC | Facility: CLINIC | Age: 81
End: 2021-09-27

## 2021-09-27 NOTE — TELEPHONE ENCOUNTER
----- Message from Tuyet Stiles MD sent at 9/27/2021  8:38 AM EDT -----  Please call the patient regarding his abnormal result  Blood sugar and kidney function are stable

## 2021-09-30 DIAGNOSIS — I12.9 BENIGN HYPERTENSION WITH CHRONIC KIDNEY DISEASE, STAGE III (HCC): ICD-10-CM

## 2021-09-30 DIAGNOSIS — N18.30 BENIGN HYPERTENSION WITH CHRONIC KIDNEY DISEASE, STAGE III (HCC): ICD-10-CM

## 2021-09-30 RX ORDER — LOSARTAN POTASSIUM AND HYDROCHLOROTHIAZIDE 12.5; 5 MG/1; MG/1
TABLET ORAL
Qty: 180 TABLET | Refills: 0 | Status: SHIPPED | OUTPATIENT
Start: 2021-09-30 | End: 2022-01-18

## 2021-10-05 ENCOUNTER — OFFICE VISIT (OUTPATIENT)
Dept: NEPHROLOGY | Facility: CLINIC | Age: 81
End: 2021-10-05
Payer: COMMERCIAL

## 2021-10-05 VITALS
DIASTOLIC BLOOD PRESSURE: 74 MMHG | HEIGHT: 70 IN | SYSTOLIC BLOOD PRESSURE: 148 MMHG | WEIGHT: 236 LBS | BODY MASS INDEX: 33.79 KG/M2

## 2021-10-05 DIAGNOSIS — E83.9 CHRONIC KIDNEY DISEASE-MINERAL AND BONE DISORDER: ICD-10-CM

## 2021-10-05 DIAGNOSIS — N06.9 ISOLATED PROTEINURIA WITH MORPHOLOGIC LESION: ICD-10-CM

## 2021-10-05 DIAGNOSIS — E87.6 HYPOKALEMIA: ICD-10-CM

## 2021-10-05 DIAGNOSIS — I10 RESISTANT HYPERTENSION: ICD-10-CM

## 2021-10-05 DIAGNOSIS — M89.9 CHRONIC KIDNEY DISEASE-MINERAL AND BONE DISORDER: ICD-10-CM

## 2021-10-05 DIAGNOSIS — N18.30 BENIGN HYPERTENSION WITH CHRONIC KIDNEY DISEASE, STAGE III (HCC): ICD-10-CM

## 2021-10-05 DIAGNOSIS — I12.9 BENIGN HYPERTENSION WITH CHRONIC KIDNEY DISEASE, STAGE III (HCC): ICD-10-CM

## 2021-10-05 DIAGNOSIS — N18.9 CHRONIC KIDNEY DISEASE-MINERAL AND BONE DISORDER: ICD-10-CM

## 2021-10-05 DIAGNOSIS — N18.32 STAGE 3B CHRONIC KIDNEY DISEASE (HCC): Primary | ICD-10-CM

## 2021-10-05 PROCEDURE — 99214 OFFICE O/P EST MOD 30 MIN: CPT | Performed by: INTERNAL MEDICINE

## 2021-10-05 RX ORDER — BIOTIN 5 MG
TABLET ORAL DAILY
COMMUNITY

## 2021-10-05 RX ORDER — TAURINE 500 MG
CAPSULE ORAL DAILY
COMMUNITY

## 2021-10-11 ENCOUNTER — RA CDI HCC (OUTPATIENT)
Dept: OTHER | Facility: HOSPITAL | Age: 81
End: 2021-10-11

## 2021-10-19 ENCOUNTER — OFFICE VISIT (OUTPATIENT)
Dept: FAMILY MEDICINE CLINIC | Facility: CLINIC | Age: 81
End: 2021-10-19
Payer: COMMERCIAL

## 2021-10-19 VITALS
SYSTOLIC BLOOD PRESSURE: 120 MMHG | RESPIRATION RATE: 16 BRPM | HEART RATE: 66 BPM | BODY MASS INDEX: 33.61 KG/M2 | HEIGHT: 70 IN | WEIGHT: 234.8 LBS | OXYGEN SATURATION: 97 % | TEMPERATURE: 97.1 F | DIASTOLIC BLOOD PRESSURE: 70 MMHG

## 2021-10-19 DIAGNOSIS — I12.9 BENIGN HYPERTENSION WITH CHRONIC KIDNEY DISEASE, STAGE III (HCC): ICD-10-CM

## 2021-10-19 DIAGNOSIS — N18.30 BENIGN HYPERTENSION WITH CHRONIC KIDNEY DISEASE, STAGE III (HCC): ICD-10-CM

## 2021-10-19 DIAGNOSIS — E11.69 DIABETES MELLITUS TYPE 2 IN OBESE (HCC): Primary | ICD-10-CM

## 2021-10-19 DIAGNOSIS — E66.9 DIABETES MELLITUS TYPE 2 IN OBESE (HCC): Primary | ICD-10-CM

## 2021-10-19 DIAGNOSIS — E78.2 MIXED HYPERLIPIDEMIA: ICD-10-CM

## 2021-10-19 DIAGNOSIS — N18.32 STAGE 3B CHRONIC KIDNEY DISEASE (HCC): ICD-10-CM

## 2021-10-19 DIAGNOSIS — Z23 ENCOUNTER FOR IMMUNIZATION: ICD-10-CM

## 2021-10-19 PROCEDURE — 99214 OFFICE O/P EST MOD 30 MIN: CPT | Performed by: INTERNAL MEDICINE

## 2021-10-19 PROCEDURE — 90662 IIV NO PRSV INCREASED AG IM: CPT

## 2021-10-19 PROCEDURE — G0008 ADMIN INFLUENZA VIRUS VAC: HCPCS

## 2021-10-19 PROCEDURE — 1160F RVW MEDS BY RX/DR IN RCRD: CPT | Performed by: INTERNAL MEDICINE

## 2021-10-19 PROCEDURE — 1036F TOBACCO NON-USER: CPT | Performed by: INTERNAL MEDICINE

## 2021-11-10 DIAGNOSIS — E78.2 MIXED HYPERLIPIDEMIA: ICD-10-CM

## 2021-11-10 RX ORDER — PRAVASTATIN SODIUM 40 MG
TABLET ORAL
Qty: 90 TABLET | Refills: 1 | Status: SHIPPED | OUTPATIENT
Start: 2021-11-10 | End: 2022-06-07

## 2021-11-19 DIAGNOSIS — E11.9 TYPE 2 DIABETES MELLITUS WITHOUT COMPLICATION, WITHOUT LONG-TERM CURRENT USE OF INSULIN (HCC): ICD-10-CM

## 2021-11-19 DIAGNOSIS — E78.2 MIXED HYPERLIPIDEMIA: ICD-10-CM

## 2021-11-22 RX ORDER — FENOFIBRIC ACID 135 MG/1
1 CAPSULE, DELAYED RELEASE ORAL DAILY
Qty: 30 CAPSULE | Refills: 5 | Status: SHIPPED | OUTPATIENT
Start: 2021-11-22 | End: 2022-06-14

## 2021-11-23 DIAGNOSIS — E11.9 TYPE 2 DIABETES MELLITUS WITHOUT COMPLICATION, WITHOUT LONG-TERM CURRENT USE OF INSULIN (HCC): ICD-10-CM

## 2021-11-23 DIAGNOSIS — E78.2 MIXED HYPERLIPIDEMIA: ICD-10-CM

## 2022-01-12 ENCOUNTER — RA CDI HCC (OUTPATIENT)
Dept: OTHER | Facility: HOSPITAL | Age: 82
End: 2022-01-12

## 2022-01-12 NOTE — PROGRESS NOTES
Please review/recertify the BPA diagnoses for 2022  [place] [address]  coding opportunities             Chart Reviewed * (Number of) Inbasket suggestions sent to Provider: 2                  Patients insurance company: [employer] (Medicare Advantage and Commercial)             [place] [address]  coding opportunities             Chart Reviewed * (Number of) Inbasket suggestions sent to Provider: 2            Number of suggestions used: 2         Patients insurance company: [employer] (Medicare Advantage and Commercial)     Visit status: Patient arrived for their scheduled appointment

## 2022-01-17 DIAGNOSIS — N18.30 BENIGN HYPERTENSION WITH CHRONIC KIDNEY DISEASE, STAGE III (HCC): ICD-10-CM

## 2022-01-17 DIAGNOSIS — I12.9 BENIGN HYPERTENSION WITH CHRONIC KIDNEY DISEASE, STAGE III (HCC): ICD-10-CM

## 2022-01-18 RX ORDER — LOSARTAN POTASSIUM AND HYDROCHLOROTHIAZIDE 12.5; 5 MG/1; MG/1
TABLET ORAL
Qty: 180 TABLET | Refills: 0 | Status: SHIPPED | OUTPATIENT
Start: 2022-01-18 | End: 2022-01-24

## 2022-01-19 ENCOUNTER — OFFICE VISIT (OUTPATIENT)
Dept: FAMILY MEDICINE CLINIC | Facility: CLINIC | Age: 82
End: 2022-01-19

## 2022-01-19 VITALS
TEMPERATURE: 98.2 F | SYSTOLIC BLOOD PRESSURE: 132 MMHG | HEART RATE: 71 BPM | OXYGEN SATURATION: 97 % | BODY MASS INDEX: 34.58 KG/M2 | WEIGHT: 241 LBS | DIASTOLIC BLOOD PRESSURE: 68 MMHG

## 2022-01-19 DIAGNOSIS — N18.32 STAGE 3B CHRONIC KIDNEY DISEASE (HCC): ICD-10-CM

## 2022-01-19 DIAGNOSIS — E78.2 MIXED HYPERLIPIDEMIA: ICD-10-CM

## 2022-01-19 DIAGNOSIS — I12.9 BENIGN HYPERTENSION WITH CHRONIC KIDNEY DISEASE, STAGE III (HCC): ICD-10-CM

## 2022-01-19 DIAGNOSIS — R01.1 HEART MURMUR: ICD-10-CM

## 2022-01-19 DIAGNOSIS — E66.9 DIABETES MELLITUS TYPE 2 IN OBESE (HCC): Primary | ICD-10-CM

## 2022-01-19 DIAGNOSIS — E11.69 DIABETES MELLITUS TYPE 2 IN OBESE (HCC): Primary | ICD-10-CM

## 2022-01-19 DIAGNOSIS — N18.30 BENIGN HYPERTENSION WITH CHRONIC KIDNEY DISEASE, STAGE III (HCC): ICD-10-CM

## 2022-01-19 PROCEDURE — 1036F TOBACCO NON-USER: CPT | Performed by: INTERNAL MEDICINE

## 2022-01-19 PROCEDURE — 99214 OFFICE O/P EST MOD 30 MIN: CPT | Performed by: INTERNAL MEDICINE

## 2022-01-19 PROCEDURE — 1160F RVW MEDS BY RX/DR IN RCRD: CPT | Performed by: INTERNAL MEDICINE

## 2022-01-19 NOTE — PROGRESS NOTES
BMI Counseling: Body mass index is 34 58 kg/m²  The BMI is above normal  Nutrition recommendations include decreasing portion sizes, decreasing fast food intake, consuming healthier snacks, limiting drinks that contain sugar, moderation in carbohydrate intake and reducing intake of cholesterol  Exercise recommendations include exercising 3-5 times per week  Rationale for BMI follow-up plan is due to patient being overweight or obese  Falls Plan of Care: balance, strength, and gait training instructions were provided  Assessment/Plan:         Problem List Items Addressed This Visit        Endocrine    Diabetes mellitus type 2 in obese [facility]) - Primary       Lab Results   Component Value Date    HGBA1C 6 3 (H) 09/24/2021   On metformin and Tradjenta  Check A1c  He has a script for BMP from nephrologist   Consider Trulicity to help him lose weight and stop Tradjenta  Patient wants to wait for 3 months and decide  Relevant Orders    HEMOGLOBIN A1C W/ EAG ESTIMATION       Cardiovascular and Mediastinum    Benign hypertension with chronic kidney disease, stage III [facility])     Lab Results   Component Value Date    EGFR 35 09/24/2021    EGFR 32 07/16/2021    EGFR 40 01/04/2021    CREATININE 1 82 (H) 09/24/2021    CREATININE 1 92 (H) 07/16/2021    CREATININE 1 61 (H) 01/04/2021   Blood pressure has been stable  He has tiredness could be related to clonidine  Monitor  He has  lab work ordered by his nephrologist             Genitourinary    CKD (chronic kidney disease), stage III [facility])     Lab Results   Component Value Date    EGFR 35 09/24/2021    EGFR 32 07/16/2021    EGFR 40 01/04/2021    CREATININE 1 82 (H) 09/24/2021    CREATININE 1 92 (H) 07/16/2021    CREATININE 1 61 (H) 01/04/2021   Monitor BMP  Other    Heart murmur     Patient was given script- pt wants to wait  No symptoms         Mixed hyperlipidemia     LDL 96 and triglyceride was 110   Tolerating pravastatin and fenofibrate well without any adverse reaction  Monitor                 Subjective:      Patient ID: Alecia Aranda is a 80 y o  male  1  Dm-2-no polyuria or polydipsia  No hypoglycemia episodes or symptoms  No increased numbness or tingling  No claudication pain  No chest pain  No dyspnea  No abdominal discomfort  2  htn-no headache dizziness or lightheadedness  No increased edema  No palpitations  No syncope  No orthopnea or chest pain  3  Dyslipidemia-tolerating pravastatin and fenofibrate well  No myalgia or muscle cramps  No claudication pain  No chest pain  No nausea vomiting or abdominal pain  4  ckd-3-GFR around 35  No hematuria or trouble urinating       The following portions of the patient's history were reviewed and updated as appropriate:   Past Medical History:  He has a past medical history of BPH (benign prostatic hyperplasia), Diabetes mellitus ([facility] ), Diverticulosis, History of cardiac cath (2005), Hyperglycemia, Hyperlipidemia, Hypertension, Kidney stone, Macular degeneration, Obesity, LEONORA on CPAP, SBO (small bowel obstruction) ([facility] ), and Swollen ankles  ,  _______________________________________________________________________  Medical Problems:  does not have any pertinent problems on file ,  _______________________________________________________________________  Past Surgical History:   has a past surgical history that includes Tonsillectomy; Hernia repair; Retinal detachment surgery; and Colonoscopy (2007)  ,  _______________________________________________________________________  Family History:  family history includes COPD in his mother; Emphysema in his mother; Lung cancer in his father ,  _______________________________________________________________________  Social History:   reports that he has never smoked  He has never used smokeless tobacco  He reports current alcohol use   He reports that he does not use drugs ,  _______________________________________________________________________  Allergies:  has No Known Allergies     _______________________________________________________________________  Current Outpatient Medications   Medication Sig Dispense Refill    aspirin 325 mg tablet Take 325 mg by mouth daily      Biotin (Super Biotin) 5 MG TABS Take by mouth daily      carvedilol (COREG) 12 5 mg tablet TAKE 1 TABLET (12 5 MG TOTAL) BY MOUTH 2 (TWO) TIMES A  tablet 3    cholecalciferol (VITAMIN D3) 1,000 units tablet Take 1,000 Units by mouth daily       cloNIDine (CATAPRES-TTS-3) 0 3 mg/24 hr PLACE 1 PATCH (0 3 MG TOTAL) ON THE SKIN ONCE A WEEK 4 patch 6    co-enzyme Q-10 30 MG capsule Take 100 mg by mouth daily       GLUCOSAMINE-CHONDROITIN PO Take 2 tablets by mouth daily       ketoconazole (NIZORAL) 2 % cream Apply topically daily 30 g 0    losartan-hydrochlorothiazide (HYZAAR) 50-12 5 mg per tablet TAKE 2 TABLETS BY MOUTH EVERY  tablet 0    LUTEIN-ZEAXANTHIN-BILBERRY PO Take by mouth 2 (two) times a day      metFORMIN (GLUCOPHAGE) 500 mg tablet TAKE 1 TABLET BY MOUTH EVERY DAY 30 tablet 5    milk thistle 175 MG tablet Take 175 mg by mouth daily      Misc Natural Products (OSTEO BI-FLEX TRIPLE STRENGTH PO) Take by mouth      multivitamin (THERAGRAN) TABS Take 1 tablet by mouth daily      Omega-3 Fatty Acids (FISH OIL) 1,000 mg Take 1,000 mg by mouth daily      pravastatin (PRAVACHOL) 40 mg tablet TAKE 1 TABLET BY MOUTH EVERY DAY 90 tablet 1    spironolactone (ALDACTONE) 25 mg tablet TAKE 1 TABLET BY MOUTH EVERY OTHER DAY 45 tablet 2    Taurine 500 MG CAPS Take by mouth daily      thiamine (VITAMIN B1) 100 mg tablet Take 100 mg by mouth daily      Tradjenta 5 MG TABS TAKE 1 TABLET BY MOUTH EVERY DAY 30 tablet 3    TURMERIC PO Take by mouth        vitamin E 100 UNIT capsule Take 100 Units by mouth daily        Choline Fenofibrate (Fenofibric Acid) 135 MG CPDR TAKE 1 CAPSULE (135 MG TOTAL) BY MOUTH DAILY 30 capsule 5     No current facility-administered medications for this visit      _______________________________________________________________________  Review of Systems      Objective:  Vitals:    01/19/22 1522   BP: 132/68   BP Location: Left arm   Patient Position: Sitting   Cuff Size: Adult   Pulse: 71   Temp: 98 2 °F (36 8 °C)   TempSrc: Tympanic   SpO2: 97%   Weight: 109 kg (241 lb)     Body mass index is 34 58 kg/m²  Physical Exam  Vitals and nursing note reviewed  Constitutional:       General: He is not in acute distress  Appearance: Normal appearance  He is well-developed  HENT:      Head: Normocephalic and atraumatic  Mouth/Throat:      Mouth: Mucous membranes are moist    Eyes:      General:         Right eye: No discharge  Left eye: No discharge  Neck:      Thyroid: No thyromegaly  Cardiovascular:      Rate and Rhythm: Normal rate and regular rhythm  Pulses: Normal pulses  Heart sounds: Murmur (   No abdominal bloating  No anorexia ) heard  Pulmonary:      Effort: Pulmonary effort is normal       Breath sounds: Normal breath sounds  No wheezing or rhonchi  Abdominal:      General: Bowel sounds are normal  There is no distension  Palpations: Abdomen is soft  Tenderness: There is no abdominal tenderness  Musculoskeletal:         General: No swelling  Cervical back: Neck supple  Right lower leg: Edema present  Left lower leg: Edema present  Lymphadenopathy:      Cervical: No cervical adenopathy  Skin:     General: Skin is warm  Capillary Refill: Capillary refill takes less than 2 seconds  Findings: No erythema  Neurological:      Mental Status: He is alert and oriented to person, place, and time  Psychiatric:         Mood and Affect: Mood normal          Behavior: Behavior normal          Thought Content:  Thought content normal

## 2022-01-19 NOTE — ASSESSMENT & PLAN NOTE
LDL 96 and triglyceride was 110  Tolerating pravastatin and fenofibrate well without any adverse reaction    Monitor

## 2022-01-19 NOTE — ASSESSMENT & PLAN NOTE
Lab Results   Component Value Date    HGBA1C 6 3 (H) 09/24/2021   On metformin and Tradjenta  Check A1c  He has a script for BMP from nephrologist   Consider Trulicity to help him lose weight and stop Tradjenta  Patient wants to wait for 3 months and decide

## 2022-01-19 NOTE — ASSESSMENT & PLAN NOTE
Lab Results   Component Value Date    EGFR 35 09/24/2021    EGFR 32 07/16/2021    EGFR 40 01/04/2021    CREATININE 1 82 (H) 09/24/2021    CREATININE 1 92 (H) 07/16/2021    CREATININE 1 61 (H) 01/04/2021   Monitor BMP

## 2022-01-19 NOTE — ASSESSMENT & PLAN NOTE
Lab Results   Component Value Date    EGFR 35 09/24/2021    EGFR 32 07/16/2021    EGFR 40 01/04/2021    CREATININE 1 82 (H) 09/24/2021    CREATININE 1 92 (H) 07/16/2021    CREATININE 1 61 (H) 01/04/2021   Blood pressure has been stable  He has tiredness could be related to clonidine  Monitor    He has  lab work ordered by his nephrologist

## 2022-01-24 DIAGNOSIS — N18.30 BENIGN HYPERTENSION WITH CHRONIC KIDNEY DISEASE, STAGE III (HCC): ICD-10-CM

## 2022-01-24 DIAGNOSIS — I12.9 BENIGN HYPERTENSION WITH CHRONIC KIDNEY DISEASE, STAGE III (HCC): ICD-10-CM

## 2022-01-24 RX ORDER — LOSARTAN POTASSIUM AND HYDROCHLOROTHIAZIDE 12.5; 5 MG/1; MG/1
TABLET ORAL
Qty: 180 TABLET | Refills: 0 | Status: SHIPPED | OUTPATIENT
Start: 2022-01-24 | End: 2022-01-24 | Stop reason: SDUPTHER

## 2022-01-24 RX ORDER — LOSARTAN POTASSIUM AND HYDROCHLOROTHIAZIDE 12.5; 5 MG/1; MG/1
2 TABLET ORAL DAILY
Qty: 180 TABLET | Refills: 1 | Status: SHIPPED | OUTPATIENT
Start: 2022-01-24 | End: 2022-02-14 | Stop reason: SDUPTHER

## 2022-02-14 DIAGNOSIS — I12.9 BENIGN HYPERTENSION WITH CHRONIC KIDNEY DISEASE, STAGE III (HCC): ICD-10-CM

## 2022-02-14 DIAGNOSIS — N18.30 BENIGN HYPERTENSION WITH CHRONIC KIDNEY DISEASE, STAGE III (HCC): ICD-10-CM

## 2022-02-14 RX ORDER — LOSARTAN POTASSIUM AND HYDROCHLOROTHIAZIDE 12.5; 5 MG/1; MG/1
2 TABLET ORAL DAILY
Qty: 180 TABLET | Refills: 1 | Status: SHIPPED | OUTPATIENT
Start: 2022-02-14 | End: 2022-02-14 | Stop reason: SDUPTHER

## 2022-02-14 RX ORDER — LOSARTAN POTASSIUM AND HYDROCHLOROTHIAZIDE 12.5; 5 MG/1; MG/1
2 TABLET ORAL DAILY
Qty: 180 TABLET | Refills: 1 | Status: SHIPPED | OUTPATIENT
Start: 2022-02-14 | End: 2022-03-18

## 2022-03-18 DIAGNOSIS — N18.30 BENIGN HYPERTENSION WITH CHRONIC KIDNEY DISEASE, STAGE III (HCC): ICD-10-CM

## 2022-03-18 DIAGNOSIS — I12.9 BENIGN HYPERTENSION WITH CHRONIC KIDNEY DISEASE, STAGE III (HCC): ICD-10-CM

## 2022-03-18 RX ORDER — LOSARTAN POTASSIUM AND HYDROCHLOROTHIAZIDE 12.5; 5 MG/1; MG/1
TABLET ORAL
Qty: 30 TABLET | Refills: 0 | Status: SHIPPED | OUTPATIENT
Start: 2022-03-18 | End: 2022-04-21

## 2022-04-06 ENCOUNTER — RA CDI HCC (OUTPATIENT)
Dept: OTHER | Facility: HOSPITAL | Age: 82
End: 2022-04-06

## 2022-04-06 NOTE — PROGRESS NOTES
E11 [id number]  [facility]  coding opportunities          Chart Reviewed number of suggestions sent to Provider: 1     Patients Insurance     Medicare Insurance: [employer]

## 2022-04-14 ENCOUNTER — TELEPHONE (OUTPATIENT)
Dept: NEPHROLOGY | Facility: CLINIC | Age: 82
End: 2022-04-14

## 2022-04-14 NOTE — TELEPHONE ENCOUNTER
Called him to let him know that we had to cancel his 4/21 appointment as Vernon Drake is not going to be in the office that day and that we will call back to reschedule when the AP schedule comes out next week

## 2022-04-15 ENCOUNTER — APPOINTMENT (OUTPATIENT)
Dept: LAB | Facility: CLINIC | Age: 82
End: 2022-04-15
Payer: COMMERCIAL

## 2022-04-15 DIAGNOSIS — E11.69 DIABETES MELLITUS TYPE 2 IN OBESE (HCC): ICD-10-CM

## 2022-04-15 DIAGNOSIS — N18.32 STAGE 3B CHRONIC KIDNEY DISEASE (HCC): ICD-10-CM

## 2022-04-15 DIAGNOSIS — E66.9 DIABETES MELLITUS TYPE 2 IN OBESE (HCC): ICD-10-CM

## 2022-04-15 LAB
ALBUMIN SERPL BCP-MCNC: 3.7 G/DL (ref 3.5–5)
ANION GAP SERPL CALCULATED.3IONS-SCNC: 6 MMOL/L (ref 4–13)
BUN SERPL-MCNC: 36 MG/DL (ref 5–25)
CALCIUM SERPL-MCNC: 9.7 MG/DL (ref 8.3–10.1)
CHLORIDE SERPL-SCNC: 109 MMOL/L (ref 100–108)
CO2 SERPL-SCNC: 27 MMOL/L (ref 21–32)
CREAT SERPL-MCNC: 2 MG/DL (ref 0.6–1.3)
CREAT UR-MCNC: 84 MG/DL
EST. AVERAGE GLUCOSE BLD GHB EST-MCNC: 143 MG/DL
GFR SERPL CREATININE-BSD FRML MDRD: 30 ML/MIN/1.73SQ M
GLUCOSE P FAST SERPL-MCNC: 145 MG/DL (ref 65–99)
HBA1C MFR BLD: 6.6 %
MAGNESIUM SERPL-MCNC: 2.3 MG/DL (ref 1.6–2.6)
PHOSPHATE SERPL-MCNC: 3.8 MG/DL (ref 2.3–4.1)
POTASSIUM SERPL-SCNC: 4.5 MMOL/L (ref 3.5–5.3)
PROT UR-MCNC: 97 MG/DL
PROT/CREAT UR: 1.15 MG/G{CREAT} (ref 0–0.1)
SODIUM SERPL-SCNC: 142 MMOL/L (ref 136–145)

## 2022-04-15 PROCEDURE — 82570 ASSAY OF URINE CREATININE: CPT

## 2022-04-15 PROCEDURE — 80069 RENAL FUNCTION PANEL: CPT

## 2022-04-15 PROCEDURE — 84156 ASSAY OF PROTEIN URINE: CPT

## 2022-04-15 PROCEDURE — 83036 HEMOGLOBIN GLYCOSYLATED A1C: CPT

## 2022-04-15 PROCEDURE — 83735 ASSAY OF MAGNESIUM: CPT

## 2022-04-15 PROCEDURE — 36415 COLL VENOUS BLD VENIPUNCTURE: CPT

## 2022-04-19 DIAGNOSIS — I12.9 BENIGN HYPERTENSION WITH CHRONIC KIDNEY DISEASE, STAGE III (HCC): ICD-10-CM

## 2022-04-19 DIAGNOSIS — N18.30 BENIGN HYPERTENSION WITH CHRONIC KIDNEY DISEASE, STAGE III (HCC): ICD-10-CM

## 2022-04-19 DIAGNOSIS — I10 ESSENTIAL HYPERTENSION: ICD-10-CM

## 2022-04-20 ENCOUNTER — OFFICE VISIT (OUTPATIENT)
Dept: FAMILY MEDICINE CLINIC | Facility: CLINIC | Age: 82
End: 2022-04-20
Payer: COMMERCIAL

## 2022-04-20 VITALS
TEMPERATURE: 96.6 F | BODY MASS INDEX: 34.19 KG/M2 | OXYGEN SATURATION: 98 % | HEART RATE: 66 BPM | DIASTOLIC BLOOD PRESSURE: 80 MMHG | WEIGHT: 238.8 LBS | HEIGHT: 70 IN | SYSTOLIC BLOOD PRESSURE: 150 MMHG | RESPIRATION RATE: 16 BRPM

## 2022-04-20 DIAGNOSIS — R01.1 HEART MURMUR: ICD-10-CM

## 2022-04-20 DIAGNOSIS — E11.69 DIABETES MELLITUS TYPE 2 IN OBESE (HCC): ICD-10-CM

## 2022-04-20 DIAGNOSIS — E66.9 DIABETES MELLITUS TYPE 2 IN OBESE (HCC): ICD-10-CM

## 2022-04-20 DIAGNOSIS — Z00.00 MEDICARE ANNUAL WELLNESS VISIT, SUBSEQUENT: Primary | ICD-10-CM

## 2022-04-20 DIAGNOSIS — Z12.5 SCREENING FOR MALIGNANT NEOPLASM OF PROSTATE: ICD-10-CM

## 2022-04-20 DIAGNOSIS — E78.2 MIXED HYPERLIPIDEMIA: ICD-10-CM

## 2022-04-20 PROCEDURE — 99214 OFFICE O/P EST MOD 30 MIN: CPT | Performed by: INTERNAL MEDICINE

## 2022-04-20 PROCEDURE — 3288F FALL RISK ASSESSMENT DOCD: CPT | Performed by: INTERNAL MEDICINE

## 2022-04-20 PROCEDURE — 1101F PT FALLS ASSESS-DOCD LE1/YR: CPT | Performed by: INTERNAL MEDICINE

## 2022-04-20 PROCEDURE — G0439 PPPS, SUBSEQ VISIT: HCPCS | Performed by: INTERNAL MEDICINE

## 2022-04-20 PROCEDURE — 1125F AMNT PAIN NOTED PAIN PRSNT: CPT | Performed by: INTERNAL MEDICINE

## 2022-04-20 PROCEDURE — 1170F FXNL STATUS ASSESSED: CPT | Performed by: INTERNAL MEDICINE

## 2022-04-20 PROCEDURE — 3725F SCREEN DEPRESSION PERFORMED: CPT | Performed by: INTERNAL MEDICINE

## 2022-04-20 NOTE — PROGRESS NOTES
Depression Screening and Follow-up Plan: Patient was screened for depression during today's encounter  They screened negative with a PHQ-2 score of 0  Falls Plan of Care: balance, strength, and gait training instructions were provided  Patient's shoes and socks removed  Assessment/Plan:         Problem List Items Addressed This Visit        Endocrine    Diabetes mellitus type 2 in obese [facility])       Lab Results   Component Value Date    HGBA1C 6 6 (H) 04/15/2022   A1c and BMP reviewed and interpreted  Blood sugar around 145  Patient would like to see weight management program   I will place an order  Recheck A1c BMP TSH  Also check lipid profile before next visit  Relevant Orders    HEMOGLOBIN A1C W/ EAG ESTIMATION    Basic metabolic panel    TSH, 3rd generation    Lipid panel       Other    Medicare annual wellness visit, subsequent - Primary    Heart murmur     Patient does not want 2D echo at present  He does not have any symptoms  He will let me know if he decides about echo         BMI 33 0-33 9,adult    Relevant Orders    Ambulatory Referral to Weight Management    Mixed hyperlipidemia      Other Visit Diagnoses     Screening for malignant neoplasm of prostate        Relevant Orders    PSA, Total Screen            Subjective:      Patient ID: Rody Crabtree is a [de-identified] y o  male  1  Dm-2  No polyuria or polydipsia  No claudication pain  No numbness or tingling  No abdominal pain or diarrhea  No significant weight changes  No significant recent changes  No chest pain or increased dyspnea  2  htn- he is regularly seen by a nephrologist   No headache dizziness or lightheadedness  No palpitations or syncope  He does have chronic leg edema which is same  No palpitations  No syncope  3  ckd- no trouble urinating  No increased edema  No abdominal pain    He has a follow-up appointment with his nephrologist       The following portions of the patient's history were reviewed and updated as appropriate:   Past Medical History:  He has a past medical history of BPH (benign prostatic hyperplasia), Diabetes mellitus ([facility] ), Diverticulosis, History of cardiac cath (2005), Hyperglycemia, Hyperlipidemia, Hypertension, Kidney stone, Macular degeneration, Obesity, LEONORA on CPAP, SBO (small bowel obstruction) ([facility] ), and Swollen ankles  ,  _______________________________________________________________________  Medical Problems:  does not have any pertinent problems on file ,  _______________________________________________________________________  Past Surgical History:   has a past surgical history that includes Tonsillectomy; Hernia repair; Retinal detachment surgery; and Colonoscopy (2007)  ,  _______________________________________________________________________  Family History:  family history includes COPD in his mother; Emphysema in his mother; Lung cancer in his father ,  _______________________________________________________________________  Social History:   reports that he has never smoked  He has never used smokeless tobacco  He reports that he does not drink alcohol and does not use drugs  ,  _______________________________________________________________________  Allergies:  has No Known Allergies     _______________________________________________________________________  Current Outpatient Medications   Medication Sig Dispense Refill    aspirin 325 mg tablet Take 325 mg by mouth daily      Biotin (Super Biotin) 5 MG TABS Take by mouth daily      carvedilol (COREG) 12 5 mg tablet TAKE 1 TABLET (12 5 MG TOTAL) BY MOUTH 2 (TWO) TIMES A  tablet 3    cholecalciferol (VITAMIN D3) 1,000 units tablet Take 1,000 Units by mouth daily       Choline Fenofibrate (Fenofibric Acid) 135 MG CPDR TAKE 1 CAPSULE (135 MG TOTAL) BY MOUTH DAILY 30 capsule 5    cloNIDine (CATAPRES-TTS-3) 0 3 mg/24 hr PLACE 1 PATCH (0 3 MG TOTAL) ON THE SKIN ONCE A WEEK 4 patch 6    co-enzyme Q-10 30 MG capsule Take 100 mg by mouth daily       GLUCOSAMINE-CHONDROITIN PO Take 2 tablets by mouth daily       losartan-hydrochlorothiazide (HYZAAR) 50-12 5 mg per tablet TAKE TWO TABLETS BY MOUTH EVERY DAY 30 tablet 0    LUTEIN-ZEAXANTHIN-BILBERRY PO Take by mouth 2 (two) times a day      metFORMIN (GLUCOPHAGE) 500 mg tablet TAKE 1 TABLET BY MOUTH EVERY DAY 30 tablet 5    milk thistle 175 MG tablet Take 175 mg by mouth daily      Misc Natural Products (OSTEO BI-FLEX TRIPLE STRENGTH PO) Take by mouth      multivitamin (THERAGRAN) TABS Take 1 tablet by mouth daily      Omega-3 Fatty Acids (FISH OIL) 1,000 mg Take 1,000 mg by mouth daily      pravastatin (PRAVACHOL) 40 mg tablet TAKE 1 TABLET BY MOUTH EVERY DAY 90 tablet 1    spironolactone (ALDACTONE) 25 mg tablet TAKE 1 TABLET BY MOUTH EVERY OTHER DAY 45 tablet 2    Taurine 500 MG CAPS Take by mouth daily      thiamine (VITAMIN B1) 100 mg tablet Take 100 mg by mouth daily      Tradjenta 5 MG TABS TAKE 1 TABLET BY MOUTH EVERY DAY 30 tablet 3    TURMERIC PO Take by mouth        vitamin E 100 UNIT capsule Take 100 Units by mouth daily        ketoconazole (NIZORAL) 2 % cream Apply topically daily (Patient not taking: Reported on 4/20/2022 ) 30 g 0     No current facility-administered medications for this visit      _______________________________________________________________________  Review of Systems      Objective:  Vitals:    04/20/22 1142   BP: 150/80   BP Location: Left arm   Patient Position: Sitting   Cuff Size: Large   Pulse: 66   Resp: 16   Temp: (!) 96 6 °F (35 9 °C)   TempSrc: Temporal   SpO2: 98%   Weight: 108 kg (238 lb 12 8 oz)   Height: 5' 10" (1 778 m)     Body mass index is 34 26 kg/m²  Physical Exam  Vitals and nursing note reviewed  Constitutional:       General: He is not in acute distress  Appearance: Normal appearance  He is well-developed  He is not ill-appearing  HENT:      Head: Normocephalic and atraumatic  Mouth/Throat:      Mouth: Mucous membranes are moist    Eyes:      General:         Right eye: No discharge  Left eye: No discharge  Neck:      Thyroid: No thyromegaly  Cardiovascular:      Rate and Rhythm: Normal rate and regular rhythm  Pulses: Normal pulses  Heart sounds: Murmur heard  Pulmonary:      Effort: Pulmonary effort is normal       Breath sounds: Normal breath sounds  No wheezing or rhonchi  Abdominal:      General: Bowel sounds are normal  There is no distension  Palpations: Abdomen is soft  Tenderness: There is no abdominal tenderness  Musculoskeletal:         General: No swelling or tenderness  Cervical back: Neck supple  Right lower leg: Edema present  Left lower leg: Edema present  Lymphadenopathy:      Cervical: No cervical adenopathy  Skin:     General: Skin is warm  Findings: No erythema  Neurological:      Mental Status: He is alert and oriented to person, place, and time  Psychiatric:         Mood and Affect: Mood normal          Behavior: Behavior normal          Thought Content:  Thought content normal

## 2022-04-20 NOTE — ASSESSMENT & PLAN NOTE
Patient does not want 2D echo at present  He does not have any symptoms    He will let me know if he decides about echo

## 2022-04-20 NOTE — PATIENT INSTRUCTIONS

## 2022-04-20 NOTE — PROGRESS NOTES
Monica Portillo is here for his Subsequent Wellness visit  Last Medicare Wellness visit information reviewed, patient interviewed, no change since last AWV  Health Risk Assessment:   Patient rates overall health as good  Patient feels that their physical health rating is same  Patient is satisfied with their life  Eyesight was rated as same  Hearing was rated as same  Patient feels that their emotional and mental health rating is same  Patients states they are never, rarely angry  Patient states they are sometimes unusually tired/fatigued  Pain experienced in the last 7 days has been none  Patient states that he has experienced no weight loss or gain in last 6 months  Depression Screening:   PHQ-2 Score: 0      Fall Risk Screening: In the past year, patient has experienced: no history of falling in past year      Home Safety:  Patient does not have trouble with stairs inside or outside of their home  Patient has working smoke alarms and has working carbon monoxide detector  Home safety hazards include: none  Nutrition:   Current diet is Low Carb, Low Saturated Fat, Low Cholesterol and Limited junk food  Medications:   Patient is currently taking over-the-counter supplements  OTC medications include: see medication list  Patient is able to manage medications  Activities of Daily Living (ADLs)/Instrumental Activities of Daily Living (IADLs):   Walk and transfer into and out of bed and chair?: Yes  Dress and groom yourself?: Yes    Bathe or shower yourself?: Yes    Feed yourself?  Yes  Do your laundry/housekeeping?: Yes  Manage your money, pay your bills and track your expenses?: Yes  Make your own meals?: Yes    Do your own shopping?: Yes    Previous Hospitalizations:   Any hospitalizations or ED visits within the last 12 months?: No      Advance Care Planning:   Living will: No    Durable POA for healthcare: No      Cognitive Screening:   Provider or family/friend/caregiver concerned regarding cognition?: No    PREVENTIVE SCREENINGS      Cardiovascular Screening:    General: History Lipid Disorder and Screening Current      Diabetes Screening:     General: History Diabetes and Screening Current      Colorectal Cancer Screening:     General: Screening Not Indicated      Prostate Cancer Screening:    General: Screening Not Indicated      Osteoporosis Screening:    General: Screening Not Indicated      Abdominal Aortic Aneurysm (AAA) Screening:        General: Screening Not Indicated      Lung Cancer Screening:     General: Screening Not Indicated      Hepatitis C Screening:    General: Screening Current    Screening, Brief Intervention, and Referral to Treatment (SBIRT)    Screening  Typical number of drinks in a day: 0  Typical number of drinks in a week: 0  Interpretation: Low risk drinking behavior  AUDIT-C Screenin) How often did you have a drink containing alcohol in the past year? never  2) How many drinks did you have on a typical day when you were drinking in the past year? 0  3) How often did you have 6 or more drinks on one occasion in the past year? never    AUDIT-C Score: 0  Interpretation: Score 0-3 (male): Negative screen for alcohol misuse    Single Item Drug Screening:  How often have you used an illegal drug (including marijuana) or a prescription medication for non-medical reasons in the past year? never    Single Item Drug Screen Score: 0  Interpretation: Negative screen for possible drug use disorder    Brief Intervention  Alcohol & drug use screenings were reviewed  No concerns regarding substance use disorder identified  Other Counseling Topics:   Car/seat belt/driving safety, skin self-exam, sunscreen and regular weightbearing exercise and calcium and vitamin D intake

## 2022-04-20 NOTE — ASSESSMENT & PLAN NOTE
Lab Results   Component Value Date    HGBA1C 6 6 (H) 04/15/2022   A1c and BMP reviewed and interpreted  Blood sugar around 145  Patient would like to see weight management program   I will place an order  Recheck A1c BMP TSH  Also check lipid profile before next visit

## 2022-04-21 RX ORDER — LOSARTAN POTASSIUM AND HYDROCHLOROTHIAZIDE 12.5; 5 MG/1; MG/1
TABLET ORAL
Qty: 60 TABLET | Refills: 0 | Status: SHIPPED | OUTPATIENT
Start: 2022-04-21 | End: 2022-05-16

## 2022-04-21 RX ORDER — CLONIDINE 0.3 MG/24H
PATCH, EXTENDED RELEASE TRANSDERMAL
Qty: 4 PATCH | Refills: 0 | Status: SHIPPED | OUTPATIENT
Start: 2022-04-21 | End: 2022-05-16

## 2022-04-25 ENCOUNTER — OFFICE VISIT (OUTPATIENT)
Dept: NEPHROLOGY | Facility: CLINIC | Age: 82
End: 2022-04-25
Payer: COMMERCIAL

## 2022-04-25 VITALS
HEART RATE: 72 BPM | SYSTOLIC BLOOD PRESSURE: 178 MMHG | BODY MASS INDEX: 34.88 KG/M2 | WEIGHT: 243.6 LBS | HEIGHT: 70 IN | DIASTOLIC BLOOD PRESSURE: 82 MMHG

## 2022-04-25 DIAGNOSIS — E83.9 CHRONIC KIDNEY DISEASE-MINERAL AND BONE DISORDER: ICD-10-CM

## 2022-04-25 DIAGNOSIS — N18.32 STAGE 3B CHRONIC KIDNEY DISEASE (HCC): Primary | ICD-10-CM

## 2022-04-25 DIAGNOSIS — M89.9 CHRONIC KIDNEY DISEASE-MINERAL AND BONE DISORDER: ICD-10-CM

## 2022-04-25 DIAGNOSIS — I12.9 BENIGN HYPERTENSION WITH CHRONIC KIDNEY DISEASE, STAGE III (HCC): ICD-10-CM

## 2022-04-25 DIAGNOSIS — N18.9 CHRONIC KIDNEY DISEASE-MINERAL AND BONE DISORDER: ICD-10-CM

## 2022-04-25 DIAGNOSIS — N18.30 BENIGN HYPERTENSION WITH CHRONIC KIDNEY DISEASE, STAGE III (HCC): ICD-10-CM

## 2022-04-25 PROCEDURE — 1160F RVW MEDS BY RX/DR IN RCRD: CPT | Performed by: PHYSICIAN ASSISTANT

## 2022-04-25 PROCEDURE — 1036F TOBACCO NON-USER: CPT | Performed by: PHYSICIAN ASSISTANT

## 2022-04-25 PROCEDURE — 99214 OFFICE O/P EST MOD 30 MIN: CPT | Performed by: PHYSICIAN ASSISTANT

## 2022-04-25 NOTE — PROGRESS NOTES
Assessment and Plan:    Branda Cabot was seen today for follow-up  Diagnoses and all orders for this visit:    Stage 3b chronic kidney disease ([facility] [address] )  -     Ambulatory Referral to CKD Education Program; Future  -     Basic metabolic panel; Future  -     Basic metabolic panel; Future  -     Magnesium; Future  -     Phosphorus; Future  -     CBC; Future  -     Protein / creatinine ratio, urine; Future  -     PTH, intact; Future  -     Vitamin D 25 hydroxy; Future    Benign hypertension with chronic kidney disease, stage III (HCC)    Chronic kidney disease-mineral and bone disorder      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  Creatinine is just slightly above baseline at 2 0  Electrolytes stable  Repeat BMP in 1 month  Kidney Smart: referred (prefers phone)    Proteinuria- He takes losartan and spironolactone  UPC ratio is 1 15 which is slightly above goal     Hypertension- Antihypertensive regimen includes Clonidine 0 3mg patch, Carvedilol 12 5mg twice a day, Losartan/HCT 50/12 5mg twice a day, Spironolactone 12 5mg daily  Avoid salt in your diet  Avoid NSAIDs  Home BPs 120/80  BP elevated  Please take your blood pressure at home and send in 1 week of readings to our office  Bone Mineral Disorder- check studies prior to next visit  Diabetes mellitus- Continue management per your PCP  Follow up with Dr Jaren Stinson or an AP in 4 months  Please call the office with any questions or concerns  Reason for Visit: Follow-up ([id number])    HPI: Jose Allen is a [de-identified] y o  male who is here for follow up of chronic kidney disease  He last saw Dr Jaren Stinson in October 2021  He is feeling well and has no acute complaints  He denies SOB  He denies N/V/D  His LE edema is at baseline  He denies urinary complaints  He follows a low sodium diet without adding salt to his food  We discussed avoiding a lot of red meat    He wears compression stockings and elevated his legs at night  He checks his blood pressure and it is always good per his report on his home cuff, around 120/80  He denies headache, lightheadedness, blurry vision  ROS: A complete review of systems was performed and was negative unless otherwise noted in the history of present illness  Allergies:   Patient has no known allergies      Medications:     Current Outpatient Medications:     aspirin 325 mg tablet, Take 325 mg by mouth daily, Disp: , Rfl:     Biotin (Super Biotin) 5 MG TABS, Take by mouth daily, Disp: , Rfl:     carvedilol (COREG) 12 5 mg tablet, TAKE 1 TABLET (12 5 MG TOTAL) BY MOUTH 2 (TWO) TIMES A DAY, Disp: 180 tablet, Rfl: 3    cholecalciferol (VITAMIN D3) 1,000 units tablet, Take 1,000 Units by mouth daily , Disp: , Rfl:     cloNIDine (CATAPRES-TTS-3) 0 3 mg/24 hr, PLACE ONE PATCH (0 3MG TOTAL) ON THE SKIN ONCE A WEEK, Disp: 4 patch, Rfl: 0    co-enzyme Q-10 30 MG capsule, Take 100 mg by mouth daily , Disp: , Rfl:     GLUCOSAMINE-CHONDROITIN PO, Take 2 tablets by mouth daily , Disp: , Rfl:     losartan-hydrochlorothiazide (HYZAAR) 50-12 5 mg per tablet, TAKE TWO TABLETS BY MOUTH EVERY DAY, Disp: 60 tablet, Rfl: 0    LUTEIN-ZEAXANTHIN-BILBERRY PO, Take by mouth 2 (two) times a day, Disp: , Rfl:     metFORMIN (GLUCOPHAGE) 500 mg tablet, TAKE 1 TABLET BY MOUTH EVERY DAY, Disp: 30 tablet, Rfl: 5    milk thistle 175 MG tablet, Take 175 mg by mouth daily, Disp: , Rfl:     Misc Natural Products (OSTEO BI-FLEX TRIPLE STRENGTH PO), Take by mouth, Disp: , Rfl:     multivitamin (THERAGRAN) TABS, Take 1 tablet by mouth daily, Disp: , Rfl:     Omega-3 Fatty Acids (FISH OIL) 1,000 mg, Take 1,000 mg by mouth daily, Disp: , Rfl:     pravastatin (PRAVACHOL) 40 mg tablet, TAKE 1 TABLET BY MOUTH EVERY DAY, Disp: 90 tablet, Rfl: 1    spironolactone (ALDACTONE) 25 mg tablet, TAKE 1 TABLET BY MOUTH EVERY OTHER DAY, Disp: 45 tablet, Rfl: 2    Taurine 500 MG CAPS, Take by mouth daily, Disp: , Rfl:     thiamine (VITAMIN B1) 100 mg tablet, Take 100 mg by mouth daily, Disp: , Rfl:     Tradjenta 5 MG TABS, TAKE 1 TABLET BY MOUTH EVERY DAY, Disp: 30 tablet, Rfl: 3    TURMERIC PO, Take by mouth  , Disp: , Rfl:     vitamin E 100 UNIT capsule, Take 100 Units by mouth daily  , Disp: , Rfl:     Choline Fenofibrate (Fenofibric Acid) 135 MG CPDR, TAKE 1 CAPSULE (135 MG TOTAL) BY MOUTH DAILY, Disp: 30 capsule, Rfl: 5    ketoconazole (NIZORAL) 2 % cream, Apply topically daily (Patient not taking: Reported on 4/20/2022 ), Disp: 30 g, Rfl: 0    Past Medical History:   Diagnosis Date    BPH (benign prostatic hyperplasia)     Diabetes mellitus ([facility] [address] )     Diverticulosis     History of cardiac cath 2005    Hyperglycemia     Hyperlipidemia     Hypertension     Kidney stone     Macular degeneration     RIGHT    Obesity     LEONORA on CPAP     SBO (small bowel obstruction) (HCC)     Swollen ankles     LE EDEMA/VI     Past Surgical History:   Procedure Laterality Date    COLONOSCOPY  2007    HERNIA REPAIR      RETINAL DETACHMENT SURGERY      TONSILLECTOMY       Family History   Problem Relation Age of Onset    COPD Mother     Emphysema Mother     Lung cancer Father       reports that he has never smoked  He has never used smokeless tobacco  He reports that he does not drink alcohol and does not use drugs  Physical Exam:   Vitals:    04/25/22 1424 04/25/22 1442 04/25/22 1443   BP:  (!) 180/84 (!) 178/82   BP Location:  Right arm Right arm   Patient Position:  Sitting Sitting   Cuff Size:  Large Large   Pulse:  72    Weight: 110 kg (243 lb 9 6 oz)     Height: 5' 10" (1 778 m)       Body mass index is 34 95 kg/m²      General: NAD  Neuro: AAO  Skin: no rash  Eyes: anicteric  ENMT: mm moist  Neck: no masses  Respiratory: CTAB  Cardiovascular: RRR  Extremities: + bilateral LE edema  Gastrointestinal: soft nt nd    Procedure:  No results found for this or any previous visit  Lab Results   Component Value Date    CALCIUM 9 7 04/15/2022    K 4 5 04/15/2022    CO2 27 04/15/2022     (H) 04/15/2022    BUN 36 (H) 04/15/2022    CREATININE 2 00 (H) 04/15/2022     I have personally reviewed the blood work as stated above and in my note  I have personally reviewed Dr Eboni Frederick last office note

## 2022-04-25 NOTE — PATIENT INSTRUCTIONS
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  Creatinine is just slightly above baseline at 2 0  Electrolytes stable  Repeat creatinine in 1 month  Kidney Smart: referred (prefers phone)    Proteinuria- He takes losartan and spironolactone  UPC ratio is 1 15 which is slightly above goal     Hypertension- Antihypertensive regimen includes Clonidine 0 3mg patch, Carvedilol 12 5mg twice a day, Losartan/HCT 50/12 5mg twice a day, Spironolactone 12 5mg daily  Avoid salt in your diet  Avoid NSAIDs  BP elevated  Please take your blood pressure at home and send in 1 week of readings to our office  Bone Mineral Disorder- check studies prior to next visit  Diabetes mellitus- Continue management per your PCP  Follow up with Dr Noah Deshpande or an AP in 4 months  Please call the office with any questions or concerns

## 2022-05-24 DIAGNOSIS — E66.9 DIABETES MELLITUS TYPE 2 IN OBESE (HCC): Primary | ICD-10-CM

## 2022-05-24 DIAGNOSIS — E11.9 TYPE 2 DIABETES MELLITUS WITHOUT COMPLICATION, WITH LONG-TERM CURRENT USE OF INSULIN (HCC): ICD-10-CM

## 2022-05-24 DIAGNOSIS — Z79.4 TYPE 2 DIABETES MELLITUS WITHOUT COMPLICATION, WITH LONG-TERM CURRENT USE OF INSULIN (HCC): ICD-10-CM

## 2022-05-24 DIAGNOSIS — E11.69 DIABETES MELLITUS TYPE 2 IN OBESE (HCC): Primary | ICD-10-CM

## 2022-05-24 RX ORDER — LINAGLIPTIN 5 MG/1
5 TABLET, FILM COATED ORAL DAILY
Qty: 30 TABLET | Refills: 5 | Status: SHIPPED | OUTPATIENT
Start: 2022-05-24

## 2022-05-24 NOTE — PROGRESS NOTES
[address] [facility]  Pawan Villatoro    Recommendation: Discussed medication adherence with patient and methods to improve adherence  The following medications refills are pended Tradjenta    Reason for documentation: Patient was flagged by Third Party Payer and/or internal report as being due for a refill on the following medications: metformin and Tradjenta    Metformin refill requested from pharmacy by clinical pharmacist      Adherence Assessment (goal > 80%):     Tradjenta:   Adherence = 67%  No refills remaining at pharmacy    Metformin:  Adherence = 49%  Refills remaining    PCP: Please sign pended rxs     Reason For [facility] Pharmacist    Demographics  Interaction Method: Phone  Type of Intervention: New    Topic(s) Addressed  Quality measures    Intervention(s) Made      Non-Pharmacologic:    -- Adherence addressed  -- Care coordination    Tool(s) Used  Payer Portal    Time Spent:   Time Spent in Direct Patient Care: 5 minutes  Time Spent in Care Coordination: 10 minutes    Recommendations  Recipient: Provider  Outcome: Pending/Follow-up Required

## 2022-05-25 DIAGNOSIS — I12.9 BENIGN HYPERTENSION WITH CHRONIC KIDNEY DISEASE, STAGE III (HCC): ICD-10-CM

## 2022-05-25 DIAGNOSIS — N18.30 BENIGN HYPERTENSION WITH CHRONIC KIDNEY DISEASE, STAGE III (HCC): ICD-10-CM

## 2022-05-25 RX ORDER — SPIRONOLACTONE 25 MG/1
TABLET ORAL
Qty: 45 TABLET | Refills: 0 | Status: SHIPPED | OUTPATIENT
Start: 2022-05-25

## 2022-06-07 DIAGNOSIS — E78.2 MIXED HYPERLIPIDEMIA: ICD-10-CM

## 2022-06-07 RX ORDER — PRAVASTATIN SODIUM 40 MG
TABLET ORAL
Qty: 90 TABLET | Refills: 0 | Status: SHIPPED | OUTPATIENT
Start: 2022-06-07

## 2022-06-14 DIAGNOSIS — E11.9 TYPE 2 DIABETES MELLITUS WITHOUT COMPLICATION, WITHOUT LONG-TERM CURRENT USE OF INSULIN (HCC): ICD-10-CM

## 2022-06-14 DIAGNOSIS — E78.2 MIXED HYPERLIPIDEMIA: ICD-10-CM

## 2022-06-14 RX ORDER — FENOFIBRIC ACID 135 MG/1
CAPSULE, DELAYED RELEASE ORAL
Qty: 30 CAPSULE | Refills: 0 | Status: SHIPPED | OUTPATIENT
Start: 2022-06-14 | End: 2022-07-29

## 2022-07-14 DIAGNOSIS — E78.2 MIXED HYPERLIPIDEMIA: ICD-10-CM

## 2022-07-14 RX ORDER — CARVEDILOL 12.5 MG/1
TABLET ORAL
Qty: 180 TABLET | Refills: 0 | Status: SHIPPED | OUTPATIENT
Start: 2022-07-14 | End: 2022-10-14

## 2022-07-19 ENCOUNTER — APPOINTMENT (OUTPATIENT)
Dept: LAB | Facility: CLINIC | Age: 82
End: 2022-07-19
Payer: COMMERCIAL

## 2022-07-19 DIAGNOSIS — N18.32 STAGE 3B CHRONIC KIDNEY DISEASE (HCC): ICD-10-CM

## 2022-07-19 LAB
25(OH)D3 SERPL-MCNC: 25.2 NG/ML (ref 30–100)
ANION GAP SERPL CALCULATED.3IONS-SCNC: 4 MMOL/L (ref 4–13)
BUN SERPL-MCNC: 35 MG/DL (ref 5–25)
CALCIUM SERPL-MCNC: 9.7 MG/DL (ref 8.3–10.1)
CHLORIDE SERPL-SCNC: 113 MMOL/L (ref 96–108)
CO2 SERPL-SCNC: 28 MMOL/L (ref 21–32)
CREAT SERPL-MCNC: 2.24 MG/DL (ref 0.6–1.3)
CREAT UR-MCNC: 148 MG/DL
ERYTHROCYTE [DISTWIDTH] IN BLOOD BY AUTOMATED COUNT: 13.5 % (ref 11.6–15.1)
GFR SERPL CREATININE-BSD FRML MDRD: 26 ML/MIN/1.73SQ M
GLUCOSE SERPL-MCNC: 126 MG/DL (ref 65–140)
HCT VFR BLD AUTO: 39.6 % (ref 36.5–49.3)
HGB BLD-MCNC: 12.8 G/DL (ref 12–17)
MAGNESIUM SERPL-MCNC: 2.4 MG/DL (ref 1.6–2.6)
MCH RBC QN AUTO: 30.8 PG (ref 26.8–34.3)
MCHC RBC AUTO-ENTMCNC: 32.3 G/DL (ref 31.4–37.4)
MCV RBC AUTO: 95 FL (ref 82–98)
PHOSPHATE SERPL-MCNC: 3.3 MG/DL (ref 2.3–4.1)
PLATELET # BLD AUTO: 210 THOUSANDS/UL (ref 149–390)
PMV BLD AUTO: 10.5 FL (ref 8.9–12.7)
POTASSIUM SERPL-SCNC: 4.2 MMOL/L (ref 3.5–5.3)
PROT UR-MCNC: 132 MG/DL
PROT/CREAT UR: 0.89 MG/G{CREAT} (ref 0–0.1)
PTH-INTACT SERPL-MCNC: 18.9 PG/ML (ref 18.4–80.1)
RBC # BLD AUTO: 4.15 MILLION/UL (ref 3.88–5.62)
SODIUM SERPL-SCNC: 145 MMOL/L (ref 135–147)
WBC # BLD AUTO: 6.78 THOUSAND/UL (ref 4.31–10.16)

## 2022-07-19 PROCEDURE — 85027 COMPLETE CBC AUTOMATED: CPT

## 2022-07-19 PROCEDURE — 83735 ASSAY OF MAGNESIUM: CPT

## 2022-07-19 PROCEDURE — 80048 BASIC METABOLIC PNL TOTAL CA: CPT

## 2022-07-19 PROCEDURE — 36415 COLL VENOUS BLD VENIPUNCTURE: CPT

## 2022-07-19 PROCEDURE — 82570 ASSAY OF URINE CREATININE: CPT

## 2022-07-19 PROCEDURE — 83970 ASSAY OF PARATHORMONE: CPT

## 2022-07-19 PROCEDURE — 84156 ASSAY OF PROTEIN URINE: CPT

## 2022-07-19 PROCEDURE — 84100 ASSAY OF PHOSPHORUS: CPT

## 2022-07-19 PROCEDURE — 82306 VITAMIN D 25 HYDROXY: CPT

## 2022-07-21 ENCOUNTER — OFFICE VISIT (OUTPATIENT)
Dept: FAMILY MEDICINE CLINIC | Facility: CLINIC | Age: 82
End: 2022-07-21
Payer: COMMERCIAL

## 2022-07-21 VITALS
BODY MASS INDEX: 34.19 KG/M2 | OXYGEN SATURATION: 96 % | DIASTOLIC BLOOD PRESSURE: 76 MMHG | RESPIRATION RATE: 16 BRPM | HEIGHT: 70 IN | TEMPERATURE: 97.6 F | SYSTOLIC BLOOD PRESSURE: 130 MMHG | HEART RATE: 68 BPM | WEIGHT: 238.8 LBS

## 2022-07-21 DIAGNOSIS — N18.32 STAGE 3B CHRONIC KIDNEY DISEASE (HCC): ICD-10-CM

## 2022-07-21 DIAGNOSIS — E78.2 MIXED HYPERLIPIDEMIA: ICD-10-CM

## 2022-07-21 DIAGNOSIS — E66.9 DIABETES MELLITUS TYPE 2 IN OBESE (HCC): Primary | ICD-10-CM

## 2022-07-21 DIAGNOSIS — E11.69 DIABETES MELLITUS TYPE 2 IN OBESE (HCC): Primary | ICD-10-CM

## 2022-07-21 DIAGNOSIS — I12.9 BENIGN HYPERTENSION WITH CHRONIC KIDNEY DISEASE, STAGE III (HCC): ICD-10-CM

## 2022-07-21 DIAGNOSIS — N18.30 BENIGN HYPERTENSION WITH CHRONIC KIDNEY DISEASE, STAGE III (HCC): ICD-10-CM

## 2022-07-21 PROCEDURE — 1160F RVW MEDS BY RX/DR IN RCRD: CPT | Performed by: INTERNAL MEDICINE

## 2022-07-21 PROCEDURE — 99214 OFFICE O/P EST MOD 30 MIN: CPT | Performed by: INTERNAL MEDICINE

## 2022-07-21 NOTE — ASSESSMENT & PLAN NOTE
Lab Results   Component Value Date    EGFR 26 07/19/2022    EGFR 30 04/15/2022    EGFR 35 09/24/2021    CREATININE 2 24 (H) 07/19/2022    CREATININE 2 00 (H) 04/15/2022    CREATININE 1 82 (H) 09/24/2021   Seeing a nephrologist   GFR went down to 26

## 2022-07-21 NOTE — PROGRESS NOTES
Falls Plan of Care: balance, strength, and gait training instructions were provided  Assessment/Plan:         Problem List Items Addressed This Visit        Endocrine    Diabetes mellitus type 2 in obese [facility]) - Primary       Cardiovascular and Mediastinum    Benign hypertension with chronic kidney disease, stage III ([facility])       Genitourinary    CKD (chronic kidney disease), stage III ([address] )       Other    Mixed hyperlipidemia            Subjective:      Patient ID: Polo Franklin is a [de-identified] y o  male  1  Dm-2-no polyuria or polydipsia  No increased numbness or tingling  No claudication pain  No abdominal pain or diarrhea  No chest pain or increased dyspnea  No change in the vision  Weight is stable  2 htn- repeat blood pressure better  No headache dizziness or lightheadedness  No palpitations or syncope  No orthopnea  No increased edema  3 ckd-4- GFR down to 26  No trouble urinating  No increased fatigue  No anorexia  The following portions of the patient's history were reviewed and updated as appropriate:   Past Medical History:  He has a past medical history of BPH (benign prostatic hyperplasia), Diabetes mellitus ([facility] [address] ), Diverticulosis, History of cardiac cath (2005), Hyperglycemia, Hyperlipidemia, Hypertension, Kidney stone, Macular degeneration, Obesity, LEONORA on CPAP, SBO (small bowel obstruction) ([facility] [address] ), and Swollen ankles  ,  _______________________________________________________________________  Medical Problems:  does not have any pertinent problems on file ,  _______________________________________________________________________  Past Surgical History:   has a past surgical history that includes Tonsillectomy; Hernia repair; Retinal detachment surgery; and Colonoscopy (2007)  ,  _______________________________________________________________________  Family History:  family history includes COPD in his mother; Emphysema in his mother; Lung cancer in his father ,  _______________________________________________________________________  Social History:   reports that he has never smoked  He has never used smokeless tobacco  He reports that he does not drink alcohol and does not use drugs  ,  _______________________________________________________________________  Allergies:  has No Known Allergies     _______________________________________________________________________  Current Outpatient Medications   Medication Sig Dispense Refill    aspirin 325 mg tablet Take 325 mg by mouth daily      Biotin 5 MG TABS Take by mouth daily      carvedilol (COREG) 12 5 mg tablet TAKE ONE TABLET BY MOUTH 2 TIMES A  tablet 0    cholecalciferol (VITAMIN D3) 1,000 units tablet Take 1,000 Units by mouth daily       Choline Fenofibrate (Fenofibric Acid) 135 MG CPDR TAKE ONE CAPSULE BY MOUTH EVERY DAY 30 capsule 0    cloNIDine (CATAPRES-TTS-3) 0 3 mg/24 hr PLACE ONE PATCH (0 3MG TOTAL) ON THE SKIN ONCE A WEEK 4 patch 5    co-enzyme Q-10 30 MG capsule Take 100 mg by mouth daily       GLUCOSAMINE-CHONDROITIN PO Take 2 tablets by mouth daily       losartan-hydrochlorothiazide (HYZAAR) 50-12 5 mg per tablet TAKE TWO TABLETS BY MOUTH EVERY DAY 60 tablet 5    LUTEIN-ZEAXANTHIN-BILBERRY PO Take by mouth 2 (two) times a day      milk thistle 175 MG tablet Take 175 mg by mouth daily      Misc Natural Products (OSTEO BI-FLEX TRIPLE STRENGTH PO) Take by mouth      multivitamin (THERAGRAN) TABS Take 1 tablet by mouth daily      Omega-3 Fatty Acids (FISH OIL) 1,000 mg Take 1,000 mg by mouth daily      pravastatin (PRAVACHOL) 40 mg tablet TAKE ONE TABLET BY MOUTH EVERY DAY 90 tablet 0    spironolactone (ALDACTONE) 25 mg tablet TAKE ONE TABLET BY MOUTH EVERY OTHER DAY 45 tablet 0    Taurine 500 MG CAPS Take by mouth daily      thiamine (VITAMIN B1) 100 mg tablet Take 100 mg by mouth daily      Tradjenta 5 MG TABS Take 5 mg by mouth in the morning   30 tablet 5    TURMERIC PO Take by mouth        vitamin E 100 UNIT capsule Take 100 Units by mouth daily         No current facility-administered medications for this visit      _______________________________________________________________________  Review of Systems   Constitutional: Negative for chills, fatigue and fever  HENT: Negative for congestion, nosebleeds and rhinorrhea  Eyes: Negative for discharge and visual disturbance  Respiratory: Negative for cough, chest tightness, shortness of breath and wheezing  Cardiovascular: Positive for leg swelling  Negative for chest pain  Gastrointestinal: Negative for abdominal distention, abdominal pain, constipation, diarrhea and vomiting  Endocrine: Negative for polydipsia and polyuria  Genitourinary: Negative for difficulty urinating, frequency and hematuria  Musculoskeletal: Negative for arthralgias, back pain and myalgias  Skin: Negative for rash  Allergic/Immunologic: Negative for environmental allergies  Neurological: Negative for dizziness, syncope, weakness, light-headedness and headaches  Hematological: Negative  Psychiatric/Behavioral: Negative for agitation, confusion, decreased concentration, dysphoric mood and suicidal ideas  The patient is not nervous/anxious  All other systems reviewed and are negative  Objective:  Vitals:    07/21/22 1151   BP: 130/76   BP Location: Left arm   Patient Position: Sitting   Cuff Size: Large   Pulse: 68   Resp: 16   Temp: 97 6 °F (36 4 °C)   TempSrc: Temporal   SpO2: 96%   Weight: 108 kg (238 lb 12 8 oz)   Height: 5' 10" (1 778 m)     Body mass index is 34 26 kg/m²  Physical Exam  Vitals and nursing note reviewed  Constitutional:       General: He is not in acute distress  Appearance: Normal appearance  He is well-developed  HENT:      Head: Normocephalic and atraumatic  Mouth/Throat:      Mouth: Mucous membranes are moist    Eyes:      General:         Right eye: No discharge           Left eye: No discharge  Neck:      Thyroid: No thyromegaly  Cardiovascular:      Rate and Rhythm: Normal rate and regular rhythm  Pulses: Normal pulses  Heart sounds: Murmur heard  Pulmonary:      Effort: Pulmonary effort is normal       Breath sounds: Normal breath sounds  No wheezing or rhonchi  Abdominal:      General: Bowel sounds are normal  There is no distension  Palpations: Abdomen is soft  Tenderness: There is no abdominal tenderness  Musculoskeletal:         General: No swelling or tenderness  Cervical back: Neck supple  Right lower leg: Edema present  Left lower leg: Edema present  Lymphadenopathy:      Cervical: No cervical adenopathy  Skin:     General: Skin is warm  Capillary Refill: Capillary refill takes less than 2 seconds  Findings: No erythema  Neurological:      Mental Status: He is alert and oriented to person, place, and time  Psychiatric:         Mood and Affect: Mood normal          Behavior: Behavior normal          Thought Content:  Thought content normal

## 2022-07-21 NOTE — ASSESSMENT & PLAN NOTE
Lab Results   Component Value Date    HGBA1C 6 6 (H) 04/15/2022   Stop metformin due to worsening GFR  Advised to get labs done

## 2022-07-21 NOTE — PATIENT INSTRUCTIONS

## 2022-07-26 ENCOUNTER — TELEPHONE (OUTPATIENT)
Dept: NEPHROLOGY | Facility: CLINIC | Age: 82
End: 2022-07-26

## 2022-07-26 ENCOUNTER — DOCUMENTATION (OUTPATIENT)
Dept: NEPHROLOGY | Facility: CLINIC | Age: 82
End: 2022-07-26

## 2022-07-26 DIAGNOSIS — R79.89 ELEVATED SERUM CREATININE: Primary | ICD-10-CM

## 2022-07-26 NOTE — TELEPHONE ENCOUNTER
Pt advised that CR higher than usual   Per  Dr Toan Braga, decrease Losartan/HCTZ to once a day and repeat BMP in two weeks  ----- Message from Casi Francis MD sent at 7/26/2022  2:47 PM EDT -----  Please inform Lance that I reviewed his labs and noted that his creatinine is higher than usual  Please have him decrease Losartan HCT to once a day and repeat a BMP in 2 weeks

## 2022-07-28 DIAGNOSIS — E11.9 TYPE 2 DIABETES MELLITUS WITHOUT COMPLICATION, WITHOUT LONG-TERM CURRENT USE OF INSULIN (HCC): ICD-10-CM

## 2022-07-28 DIAGNOSIS — E78.2 MIXED HYPERLIPIDEMIA: ICD-10-CM

## 2022-07-29 RX ORDER — FENOFIBRIC ACID 135 MG/1
CAPSULE, DELAYED RELEASE ORAL
Qty: 30 CAPSULE | Refills: 0 | Status: SHIPPED | OUTPATIENT
Start: 2022-07-29 | End: 2022-08-23

## 2022-08-12 ENCOUNTER — TELEPHONE (OUTPATIENT)
Dept: NEPHROLOGY | Facility: CLINIC | Age: 82
End: 2022-08-12

## 2022-08-12 DIAGNOSIS — I10 RESISTANT HYPERTENSION: Primary | ICD-10-CM

## 2022-08-12 RX ORDER — TORSEMIDE 10 MG/1
10 TABLET ORAL DAILY
Qty: 30 TABLET | Refills: 1 | Status: SHIPPED | OUTPATIENT
Start: 2022-08-12 | End: 2022-08-18 | Stop reason: SDUPTHER

## 2022-08-12 NOTE — TELEPHONE ENCOUNTER
I called the patient and we spoke over the phone  He is reporting edema since being on lower frequency of Losartan HCT  Plan:   Start Torsemide 10 mg daily   Will order repeat BMP when he sees me in the office on 8/18/22

## 2022-08-12 NOTE — TELEPHONE ENCOUNTER
Patient called stating he has been experiencing more water retention, swelling, and weight gain for the past 2-3 weeks since coming off the losartan/furosemide medication  The patient would like to know if he can be prescribed furosemide to offset these effects

## 2022-08-18 ENCOUNTER — OFFICE VISIT (OUTPATIENT)
Dept: NEPHROLOGY | Facility: CLINIC | Age: 82
End: 2022-08-18
Payer: COMMERCIAL

## 2022-08-18 VITALS
SYSTOLIC BLOOD PRESSURE: 154 MMHG | WEIGHT: 241 LBS | BODY MASS INDEX: 34.5 KG/M2 | DIASTOLIC BLOOD PRESSURE: 84 MMHG | HEIGHT: 70 IN

## 2022-08-18 DIAGNOSIS — N18.30 BENIGN HYPERTENSION WITH CHRONIC KIDNEY DISEASE, STAGE III (HCC): ICD-10-CM

## 2022-08-18 DIAGNOSIS — E83.9 CHRONIC KIDNEY DISEASE-MINERAL AND BONE DISORDER: ICD-10-CM

## 2022-08-18 DIAGNOSIS — I10 RESISTANT HYPERTENSION: ICD-10-CM

## 2022-08-18 DIAGNOSIS — N06.9 ISOLATED PROTEINURIA WITH MORPHOLOGIC LESION: ICD-10-CM

## 2022-08-18 DIAGNOSIS — M89.9 CHRONIC KIDNEY DISEASE-MINERAL AND BONE DISORDER: ICD-10-CM

## 2022-08-18 DIAGNOSIS — I12.9 BENIGN HYPERTENSION WITH CHRONIC KIDNEY DISEASE, STAGE III (HCC): ICD-10-CM

## 2022-08-18 DIAGNOSIS — N18.9 CHRONIC KIDNEY DISEASE-MINERAL AND BONE DISORDER: ICD-10-CM

## 2022-08-18 DIAGNOSIS — N18.32 STAGE 3B CHRONIC KIDNEY DISEASE (HCC): Primary | ICD-10-CM

## 2022-08-18 PROBLEM — E87.6 HYPOKALEMIA: Status: RESOLVED | Noted: 2020-11-12 | Resolved: 2022-08-18

## 2022-08-18 PROCEDURE — 1160F RVW MEDS BY RX/DR IN RCRD: CPT | Performed by: INTERNAL MEDICINE

## 2022-08-18 PROCEDURE — 99214 OFFICE O/P EST MOD 30 MIN: CPT | Performed by: INTERNAL MEDICINE

## 2022-08-18 RX ORDER — TORSEMIDE 10 MG/1
15 TABLET ORAL DAILY
Qty: 135 TABLET | Refills: 1 | Status: SHIPPED | OUTPATIENT
Start: 2022-08-18 | End: 2022-10-13

## 2022-08-18 NOTE — LETTER
[facility]    OFFICE PROGRESS NOTE      Name:  Dima Bosch  : 1936    Date of consultation:   10/30/2019    Referring physician: Kirill Ibrahim MD    Reason for Visit:  Chief Complaint   Patient presents with   • Follow-up       HPI:   Cardiac wise he is doing well.  [place] is [place] most of [place] in [place].  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  He does have some swelling of his right leg but generally doing well    Current Outpatient Medications   Medication Sig Dispense Refill   • ferrous sulfate 325 (65 FE) MG tablet Take 65 mg by mouth daily.     • risperiDONE (RISPERDAL) 1 MG tablet Take 1 mg by mouth nightly.     • Multiple Vitamins-Minerals (EYE VITAMINS) Cap Take 1 capsule by mouth 2 times daily.     • Multiple Vitamin tablet Take 1 tablet by mouth daily.     • levothyroxine 150 MCG capsule 150 mcg daily.      • cyanocobalamin 1000 MCG/ML injection Inject 1,000 mcg into the muscle as directed.     • citalopram (CELEXA) 20 MG tablet Take 20 mg by mouth daily.     • Ascorbic Acid (VITAMIN C) 500 MG tablet Take 500 mg by mouth daily.     • divalproex (DEPAKOTE ER) 250 MG 24 hr ER tablet Take 250 mg by mouth daily.     • rosuvastatin (CRESTOR) 5 MG tablet TAKE 1 TABLET AT BEDTIME   (DISCONTINUE SIMVASTATIN) 90 tablet 3   • metoPROLOL tartrate (LOPRESSOR) 25 MG tablet Take 25 mg by mouth 2 times daily.     • alfuzosin (UROXATRAL) 10 MG 24 hr tablet 1 daily     • amLODIPine (NORVASC) 2.5 MG tablet 1 daily     • aspirin 325 MG tablet 1 tab every other day     • finasteride (PROSCAR) 5 MG tablet 1 daily     • empagliflozin (JARDIANCE) 25 MG tablet 1 daily     • ergocalciferol (DRISDOL) 17731 units capsule weekly       No current facility-administered medications for this visit.      Family History:  Family History   Problem Relation Age of  August 18, 2022     Marely Vinson MD  [address] [place] [address] 1  [facility] 105    Patient: Moriah Torres   YOB: 1940   Date of Visit: 8/18/2022       Dear Dr Stephen Lennon: Thank you for referring Tom Cortes to me for evaluation  Below are my notes for this consultation  If you have questions, please do not hesitate to call me  I look forward to following your patient along with you  Sincerely,        Tierra Clark MD        CC: No Recipients  Tierra Clark MD  8/18/2022 12:33 PM  Sign when Signing Visit  NEPHROLOGY OFFICE PROGRESS NOTE   Moriah Torres [de-identified] y o  male MRN: [id number]  DATE: 08/18/22  Reason for visit: Continued evaluation and management of CKD  ASSESSMENT & PLAN:  1  Chronic kidney disease, stage IIIB  · Lance's baseline creatinine is around 1 6 to 1 9    · He has had progression of disease since 2019 when his creatinine was 1 3 to 1 4  · The etiology of his CKD is felt to be diabetic nephropathy given the proteinuria  · His creatinine is up to 2 24 in July 2022  · Since then, we already decreased his Losartan HCT but Torsemide was added after he developed edema  · He seems to be better with the Torsemide which we will increase to 1 5 tablets a day  · We will recheck a BMP 7 days after the increase in Torsemide  · He is already on double RUPERT blockade with Losartan and Spironolactone  · We discussed starting an SGLT2 inhibitor as a way to slow down progression of renal disease in the near future  He seemed skeptical about this after hearing the side effects  2  Proteinuria:  · UPC ratio is at goal    · UPC 0 89  · Continue Losartan and Spironolactone  3  Hypertension:  · Home BP readings are at goal    · Current regimen: Torsemide 10 mg OD, Clonidine 0 3 mg patch, Carvedilol 12 5 mg BID, Losartan HCT 50/12 5 mg OD, Spironolactone 12 5 mg OD  · Increase Torsemide to 15 mg daily given edema     · No evidence of Onset   • Coronary Artery Disease Neg Hx         Negative for premature CAD.    • Aneurysm Neg Hx         Negative for AAA.      Past Medical History:   Diagnosis Date   • Diabetes (CMS/HCC)    • Dyslipidemia    • Essential hypertension       Social History:  Social History     Tobacco Use   • Smoking status: Never Smoker   • Smokeless tobacco: Never Used   • Tobacco comment: Never used tobacco. denies smoking.    Substance Use Topics   • Alcohol use: Never     Frequency: Never     Comment: denies drinking.   • Drug use: Not on file        ROS:   GENERAL HEALTH: generally feels well, tolerating meds well  RESPIRATORY: no cough or shortness of breath  CARDIOVASCULAR: no chest pain, palpitations  GI: no abdominal pain   NEURO: no syncope or near syncope    All other pertinent systems reviewed and negative.    EXAM:     Visit Vitals  /64   Pulse 57   Resp 18   Ht 6' (1.829 m)   Wt 109.3 kg (241 lb)   SpO2 96%   BMI 32.69 kg/m²     GENERAL: in no apparent distress  HEENT: atraumatic, normocephalic  NECK: no JVD, normal carotid pulses without bruits  LUNGS: normal excursion, clear to auscultation bilaterally  HEART: RRR, nl S1/S2, no gallop, no murmur, no rub  ABD: soft, nontender, nondistended, normal bowel sounds  EXTREMITIES: 1+ edema right leg nontender  SKIN: warm, dry, normal turgor  NEURO: alert, oriented x3, symmetrical face, no dysarthria, no focal deficits  PSYCH: cooperative, calm    LABS:   External Labs were reviewed.    ASSESSMENT AND PLAN   CAD, multiple vessel  (primary encounter diagnosis)  Plan: Stable post bypass continue as is.  See back in the spring or as needed    Pure hypercholesterolemia  Plan: Stable continue as is good lipids.           Bernabe Bryant MD        This note was generated with Dragon dictation technology. As such unrecognized grammatical errors may be present.   hyperaldosteronism  4  Mineral and bone disease:  · PTH is at goal - 18 9 in July 2022  · Ca and phos are at goal    · Vitamin D level is below goal - 25 2 in July 2022  Inc Vit D to 2000 units daily  Patient Instructions   Increase Torsemide to 1 5 tablets daily  Please go for a repeat BMP next week  Increase vitamin-D to 2000 units daily  No change to your BP medications  Follow up in 4 months  SUBJECTIVE / INTERVAL HISTORY:  Tiki Vora was last seen in April 2022  Since that time, there have been no recent hospitalizations or ER visits  He had lab work in late July 2022 and his creatinine was noted to be above baseline (2 24)  As such, we asked him to decrease his losartan HCT from 1 tab BID to 1 tab daily  Since decrease in losartan HCT, he noted worsening leg edema  We, therefore, started him on torsemide 10 mg daily on August 12, 2022  Since starting torsemide, he reports improvement in his edema  In fact, he is pretty pleased with the progress and wants to increase to 1 5 tablets daily  Home BP is 110s to 120s/60s to 70s - he checks every other day  BP cuff calibration in Jan 2020:  Home BP cuff (wrist) 183/103  Office BP cuff  186/98     PMH/PSH: HTN, DM, HLP, CAD, LEONORA on CPAP, leg edema, BPH, DJD, SBO, macular degeneration, tonsillectomy, R inguinal hernia, retinal surgery       Previous work up:   November 1, 2019:  Aldosterone 6, plasma renin activity 0 22, UPC ratio 0 85, metanephrines 34, normetanephrines 83, TSH 1 53     10/25/19 Renal US: R 12 9 cm, L 14 3 cm, bilateral cortical cysts  No hydronephrosis  ALLERGIES: No Known Allergies    REVIEW OF SYSTEMS:  Review of Systems   Constitutional: Positive for fatigue  Negative for appetite change, chills and fever  Respiratory: Negative for cough and shortness of breath  Cardiovascular: Positive for leg swelling (better and back to baseline  )  Negative for chest pain     Gastrointestinal: Negative for abdominal pain, diarrhea, nausea and vomiting  Genitourinary: Negative for dysuria and hematuria  Musculoskeletal: Positive for arthralgias  Negative for back pain  Neurological: Negative for dizziness and light-headedness  OBJECTIVE:  /84   Ht 5' 10" (1 778 m)   Wt 109 kg (241 lb)   BMI 34 58 kg/m²   Current Weight:   Body mass index is 34 58 kg/m²  Physical Exam  Constitutional:       General: He is not in acute distress  Appearance: Normal appearance  He is well-developed  He is not ill-appearing or diaphoretic  HENT:      Head: Normocephalic and atraumatic  Eyes:      General: No scleral icterus  Conjunctiva/sclera: Conjunctivae normal    Neck:      Vascular: No JVD  Cardiovascular:      Rate and Rhythm: Normal rate and regular rhythm  Heart sounds: Murmur heard  Pulmonary:      Effort: Pulmonary effort is normal  No respiratory distress  Breath sounds: Normal breath sounds  Abdominal:      General: Bowel sounds are normal       Palpations: Abdomen is soft  Musculoskeletal:      Cervical back: Neck supple  Comments: Mild LE edema - lower 3rd of legs and ankles  Skin:     General: Skin is warm and dry  Neurological:      Mental Status: He is alert and oriented to person, place, and time     Psychiatric:         Behavior: Behavior normal          Judgment: Judgment normal        Medications:  Current Outpatient Medications:     aspirin 325 mg tablet, Take 325 mg by mouth daily, Disp: , Rfl:     Biotin 5 MG TABS, Take by mouth daily, Disp: , Rfl:     carvedilol (COREG) 12 5 mg tablet, TAKE ONE TABLET BY MOUTH 2 TIMES A DAY, Disp: 180 tablet, Rfl: 0    cholecalciferol (VITAMIN D3) 1,000 units tablet, Take 1,000 Units by mouth daily , Disp: , Rfl:     Choline Fenofibrate (Fenofibric Acid) 135 MG CPDR, TAKE ONE CAPSULE BY MOUTH EVERY DAY, Disp: 30 capsule, Rfl: 0    cloNIDine (CATAPRES-TTS-3) 0 3 mg/24 hr, PLACE ONE PATCH (0 3MG TOTAL) ON THE SKIN ONCE A WEEK, Disp: 4 patch, Rfl: 5    co-enzyme Q-10 30 MG capsule, Take 100 mg by mouth daily , Disp: , Rfl:     GLUCOSAMINE-CHONDROITIN PO, Take 2 tablets by mouth daily , Disp: , Rfl:     losartan-hydrochlorothiazide (HYZAAR) 50-12 5 mg per tablet, TAKE TWO TABLETS BY MOUTH EVERY DAY (Patient taking differently: 1 tablet daily), Disp: 60 tablet, Rfl: 5    LUTEIN-ZEAXANTHIN-BILBERRY PO, Take by mouth 2 (two) times a day, Disp: , Rfl:     milk thistle 175 MG tablet, Take 175 mg by mouth daily, Disp: , Rfl:     Misc Natural Products (OSTEO BI-FLEX TRIPLE STRENGTH PO), Take by mouth, Disp: , Rfl:     multivitamin (THERAGRAN) TABS, Take 1 tablet by mouth daily, Disp: , Rfl:     Omega-3 Fatty Acids (FISH OIL) 1,000 mg, Take 1,000 mg by mouth daily, Disp: , Rfl:     pravastatin (PRAVACHOL) 40 mg tablet, TAKE ONE TABLET BY MOUTH EVERY DAY, Disp: 90 tablet, Rfl: 0    spironolactone (ALDACTONE) 25 mg tablet, TAKE ONE TABLET BY MOUTH EVERY OTHER DAY, Disp: 45 tablet, Rfl: 0    Taurine 500 MG CAPS, Take by mouth daily, Disp: , Rfl:     thiamine (VITAMIN B1) 100 mg tablet, Take 100 mg by mouth daily, Disp: , Rfl:     torsemide (DEMADEX) 10 mg tablet, Take 1 tablet (10 mg total) by mouth daily, Disp: 30 tablet, Rfl: 1    Tradjenta 5 MG TABS, Take 5 mg by mouth in the morning , Disp: 30 tablet, Rfl: 5    TURMERIC PO, Take by mouth  , Disp: , Rfl:     vitamin E 100 UNIT capsule, Take 100 Units by mouth daily  , Disp: , Rfl:     Laboratory Results:  Results for orders placed or performed in visit on 78/60/50   Basic metabolic panel   Result Value Ref Range    Sodium 145 135 - 147 mmol/L    Potassium 4 2 3 5 - 5 3 mmol/L    Chloride 113 (H) 96 - 108 mmol/L    CO2 28 21 - 32 mmol/L    ANION GAP 4 4 - 13 mmol/L    BUN 35 (H) 5 - 25 mg/dL    Creatinine 2 24 (H) 0 60 - 1 30 mg/dL    Glucose 126 65 - 140 mg/dL    Calcium 9 7 8 3 - 10 1 mg/dL    eGFR 26 ml/min/1 73sq m   Magnesium   Result Value Ref Range    Magnesium 2 4 1 6 - 2 6 mg/dL Phosphorus   Result Value Ref Range    Phosphorus 3 3 2 3 - 4 1 mg/dL   CBC   Result Value Ref Range    WBC 6 78 4 31 - 10 16 Thousand/uL    RBC 4 15 3 88 - 5 62 Million/uL    Hemoglobin 12 8 12 0 - 17 0 g/dL    Hematocrit 39 6 36 5 - 49 3 %    MCV 95 82 - 98 fL    MCH 30 8 26 8 - 34 3 pg    MCHC 32 3 31 4 - 37 4 g/dL    RDW 13 5 11 6 - 15 1 %    Platelets 733 227 - 995 Thousands/uL    MPV 10 5 8 9 - 12 7 fL   Protein / creatinine ratio, urine   Result Value Ref Range    Creatinine, Ur 148 0 mg/dL    Protein Urine Random 132 mg/dL    Prot/Creat Ratio, Ur 0 89 (H) 0 00 - 0 10   PTH, intact   Result Value Ref Range    PTH 18 9 18 4 - 80 1 pg/mL   Vitamin D 25 hydroxy   Result Value Ref Range    Vit D, 25-Hydroxy 25 2 (L) 30 0 - 100 0 ng/mL

## 2022-08-18 NOTE — PATIENT INSTRUCTIONS
Increase Torsemide to 1 5 tablets daily  Please go for a repeat BMP next week  Increase vitamin-D to 2000 units daily  No change to your BP medications  Follow up in 4 months

## 2022-08-18 NOTE — PROGRESS NOTES
NEPHROLOGY OFFICE PROGRESS NOTE   Jaden Pacheco [de-identified] y o  male MRN: [id number]  DATE: 08/18/22  Reason for visit: Continued evaluation and management of CKD  ASSESSMENT & PLAN:  1  Chronic kidney disease, stage IIIB  · Lance's baseline creatinine is around 1 6 to 1 9    · He has had progression of disease since 2019 when his creatinine was 1 3 to 1 4  · The etiology of his CKD is felt to be diabetic nephropathy given the proteinuria  · His creatinine is up to 2 24 in July 2022  · Since then, we already decreased his Losartan HCT but Torsemide was added after he developed edema  · He seems to be better with the Torsemide which we will increase to 1 5 tablets a day  · We will recheck a BMP 7 days after the increase in Torsemide  · He is already on double RUPERT blockade with Losartan and Spironolactone  · We discussed starting an SGLT2 inhibitor as a way to slow down progression of renal disease in the near future  He seemed skeptical about this after hearing the side effects  2  Proteinuria:  · UPC ratio is at goal    · UPC 0 89  · Continue Losartan and Spironolactone  3  Hypertension:  · Home BP readings are at goal    · Current regimen: Torsemide 10 mg OD, Clonidine 0 3 mg patch, Carvedilol 12 5 mg BID, Losartan HCT 50/12 5 mg OD, Spironolactone 12 5 mg OD  · Increase Torsemide to 15 mg daily given edema  · No evidence of hyperaldosteronism  4  Mineral and bone disease:  · PTH is at goal - 18 9 in July 2022  · Ca and phos are at goal    · Vitamin D level is below goal - 25 2 in July 2022  Inc Vit D to 2000 units daily  Patient Instructions   Increase Torsemide to 1 5 tablets daily  Please go for a repeat BMP next week  Increase vitamin-D to 2000 units daily  No change to your BP medications  Follow up in 4 months  SUBJECTIVE / INTERVAL HISTORY:  Stacie Hartley was last seen in April 2022  Since that time, there have been no recent hospitalizations or ER visits    He had lab work in late July 2022 and his creatinine was noted to be above baseline (2 24)  As such, we asked him to decrease his losartan HCT from 1 tab BID to 1 tab daily  Since decrease in losartan HCT, he noted worsening leg edema  We, therefore, started him on torsemide 10 mg daily on August 12, 2022  Since starting torsemide, he reports improvement in his edema  In fact, he is pretty pleased with the progress and wants to increase to 1 5 tablets daily  Home BP is 110s to 120s/60s to 70s - he checks every other day  BP cuff calibration in Jan 2020:  Home BP cuff (wrist) 183/103  Office BP cuff  186/98     PMH/PSH: HTN, DM, HLP, CAD, LEONORA on CPAP, leg edema, BPH, DJD, SBO, macular degeneration, tonsillectomy, R inguinal hernia, retinal surgery       Previous work up:   November 1, 2019:  Aldosterone 6, plasma renin activity 0 22, UPC ratio 0 85, metanephrines 34, normetanephrines 83, TSH 1 53     10/25/19 Renal US: R 12 9 cm, L 14 3 cm, bilateral cortical cysts  No hydronephrosis  ALLERGIES: No Known Allergies    REVIEW OF SYSTEMS:  Review of Systems   Constitutional: Positive for fatigue  Negative for appetite change, chills and fever  Respiratory: Negative for cough and shortness of breath  Cardiovascular: Positive for leg swelling (better and back to baseline  )  Negative for chest pain  Gastrointestinal: Negative for abdominal pain, diarrhea, nausea and vomiting  Genitourinary: Negative for dysuria and hematuria  Musculoskeletal: Positive for arthralgias  Negative for back pain  Neurological: Negative for dizziness and light-headedness  OBJECTIVE:  /84   Ht 5' 10" (1 778 m)   Wt 109 kg (241 lb)   BMI 34 58 kg/m²   Current Weight:   Body mass index is 34 58 kg/m²  Physical Exam  Constitutional:       General: He is not in acute distress  Appearance: Normal appearance  He is well-developed  He is not ill-appearing or diaphoretic  HENT:      Head: Normocephalic and atraumatic  Eyes:      General: No scleral icterus  Conjunctiva/sclera: Conjunctivae normal    Neck:      Vascular: No JVD  Cardiovascular:      Rate and Rhythm: Normal rate and regular rhythm  Heart sounds: Murmur heard  Pulmonary:      Effort: Pulmonary effort is normal  No respiratory distress  Breath sounds: Normal breath sounds  Abdominal:      General: Bowel sounds are normal       Palpations: Abdomen is soft  Musculoskeletal:      Cervical back: Neck supple  Comments: Mild LE edema - lower 3rd of legs and ankles  Skin:     General: Skin is warm and dry  Neurological:      Mental Status: He is alert and oriented to person, place, and time     Psychiatric:         Behavior: Behavior normal          Judgment: Judgment normal        Medications:  Current Outpatient Medications:     aspirin 325 mg tablet, Take 325 mg by mouth daily, Disp: , Rfl:     Biotin 5 MG TABS, Take by mouth daily, Disp: , Rfl:     carvedilol (COREG) 12 5 mg tablet, TAKE ONE TABLET BY MOUTH 2 TIMES A DAY, Disp: 180 tablet, Rfl: 0    cholecalciferol (VITAMIN D3) 1,000 units tablet, Take 1,000 Units by mouth daily , Disp: , Rfl:     Choline Fenofibrate (Fenofibric Acid) 135 MG CPDR, TAKE ONE CAPSULE BY MOUTH EVERY DAY, Disp: 30 capsule, Rfl: 0    cloNIDine (CATAPRES-TTS-3) 0 3 mg/24 hr, PLACE ONE PATCH (0 3MG TOTAL) ON THE SKIN ONCE A WEEK, Disp: 4 patch, Rfl: 5    co-enzyme Q-10 30 MG capsule, Take 100 mg by mouth daily , Disp: , Rfl:     GLUCOSAMINE-CHONDROITIN PO, Take 2 tablets by mouth daily , Disp: , Rfl:     losartan-hydrochlorothiazide (HYZAAR) 50-12 5 mg per tablet, TAKE TWO TABLETS BY MOUTH EVERY DAY (Patient taking differently: 1 tablet daily), Disp: 60 tablet, Rfl: 5    LUTEIN-ZEAXANTHIN-BILBERRY PO, Take by mouth 2 (two) times a day, Disp: , Rfl:     milk thistle 175 MG tablet, Take 175 mg by mouth daily, Disp: , Rfl:     Misc Natural Products (OSTEO BI-FLEX TRIPLE STRENGTH PO), Take by mouth, Disp: , Rfl:     multivitamin (THERAGRAN) TABS, Take 1 tablet by mouth daily, Disp: , Rfl:     Omega-3 Fatty Acids (FISH OIL) 1,000 mg, Take 1,000 mg by mouth daily, Disp: , Rfl:     pravastatin (PRAVACHOL) 40 mg tablet, TAKE ONE TABLET BY MOUTH EVERY DAY, Disp: 90 tablet, Rfl: 0    spironolactone (ALDACTONE) 25 mg tablet, TAKE ONE TABLET BY MOUTH EVERY OTHER DAY, Disp: 45 tablet, Rfl: 0    Taurine 500 MG CAPS, Take by mouth daily, Disp: , Rfl:     thiamine (VITAMIN B1) 100 mg tablet, Take 100 mg by mouth daily, Disp: , Rfl:     torsemide (DEMADEX) 10 mg tablet, Take 1 tablet (10 mg total) by mouth daily, Disp: 30 tablet, Rfl: 1    Tradjenta 5 MG TABS, Take 5 mg by mouth in the morning , Disp: 30 tablet, Rfl: 5    TURMERIC PO, Take by mouth  , Disp: , Rfl:     vitamin E 100 UNIT capsule, Take 100 Units by mouth daily  , Disp: , Rfl:     Laboratory Results:  Results for orders placed or performed in visit on 43/13/83   Basic metabolic panel   Result Value Ref Range    Sodium 145 135 - 147 mmol/L    Potassium 4 2 3 5 - 5 3 mmol/L    Chloride 113 (H) 96 - 108 mmol/L    CO2 28 21 - 32 mmol/L    ANION GAP 4 4 - 13 mmol/L    BUN 35 (H) 5 - 25 mg/dL    Creatinine 2 24 (H) 0 60 - 1 30 mg/dL    Glucose 126 65 - 140 mg/dL    Calcium 9 7 8 3 - 10 1 mg/dL    eGFR 26 ml/min/1 73sq m   Magnesium   Result Value Ref Range    Magnesium 2 4 1 6 - 2 6 mg/dL   Phosphorus   Result Value Ref Range    Phosphorus 3 3 2 3 - 4 1 mg/dL   CBC   Result Value Ref Range    WBC 6 78 4 31 - 10 16 Thousand/uL    RBC 4 15 3 88 - 5 62 Million/uL    Hemoglobin 12 8 12 0 - 17 0 g/dL    Hematocrit 39 6 36 5 - 49 3 %    MCV 95 82 - 98 fL    MCH 30 8 26 8 - 34 3 pg    MCHC 32 3 31 4 - 37 4 g/dL    RDW 13 5 11 6 - 15 1 %    Platelets 698 146 - 152 Thousands/uL    MPV 10 5 8 9 - 12 7 fL   Protein / creatinine ratio, urine   Result Value Ref Range    Creatinine, Ur 148 0 mg/dL    Protein Urine Random 132 mg/dL    Prot/Creat Ratio, Ur 0 89 (H) 0 00 - 0 10   PTH, intact   Result Value Ref Range    PTH 18 9 18 4 - 80 1 pg/mL   Vitamin D 25 hydroxy   Result Value Ref Range    Vit D, 25-Hydroxy 25 2 (L) 30 0 - 100 0 ng/mL

## 2022-08-23 DIAGNOSIS — N18.30 BENIGN HYPERTENSION WITH CHRONIC KIDNEY DISEASE, STAGE III (HCC): ICD-10-CM

## 2022-08-23 DIAGNOSIS — E78.2 MIXED HYPERLIPIDEMIA: ICD-10-CM

## 2022-08-23 DIAGNOSIS — I12.9 BENIGN HYPERTENSION WITH CHRONIC KIDNEY DISEASE, STAGE III (HCC): ICD-10-CM

## 2022-08-23 DIAGNOSIS — E11.9 TYPE 2 DIABETES MELLITUS WITHOUT COMPLICATION, WITHOUT LONG-TERM CURRENT USE OF INSULIN (HCC): ICD-10-CM

## 2022-08-23 RX ORDER — PRAVASTATIN SODIUM 40 MG
TABLET ORAL
Qty: 90 TABLET | Refills: 0 | Status: SHIPPED | OUTPATIENT
Start: 2022-08-23

## 2022-08-23 RX ORDER — FENOFIBRIC ACID 135 MG/1
CAPSULE, DELAYED RELEASE ORAL
Qty: 30 CAPSULE | Refills: 0 | Status: SHIPPED | OUTPATIENT
Start: 2022-08-23 | End: 2022-09-28

## 2022-08-23 RX ORDER — SPIRONOLACTONE 25 MG/1
TABLET ORAL
Qty: 45 TABLET | Refills: 2 | Status: SHIPPED | OUTPATIENT
Start: 2022-08-23

## 2022-09-06 ENCOUNTER — APPOINTMENT (OUTPATIENT)
Dept: LAB | Facility: CLINIC | Age: 82
End: 2022-09-06
Payer: COMMERCIAL

## 2022-09-06 DIAGNOSIS — R79.89 ELEVATED SERUM CREATININE: ICD-10-CM

## 2022-09-06 LAB
ANION GAP SERPL CALCULATED.3IONS-SCNC: 4 MMOL/L (ref 4–13)
BUN SERPL-MCNC: 39 MG/DL (ref 5–25)
CALCIUM SERPL-MCNC: 9.6 MG/DL (ref 8.3–10.1)
CHLORIDE SERPL-SCNC: 107 MMOL/L (ref 96–108)
CO2 SERPL-SCNC: 30 MMOL/L (ref 21–32)
CREAT SERPL-MCNC: 2.46 MG/DL (ref 0.6–1.3)
GFR SERPL CREATININE-BSD FRML MDRD: 23 ML/MIN/1.73SQ M
GLUCOSE SERPL-MCNC: 123 MG/DL (ref 65–140)
POTASSIUM SERPL-SCNC: 4.3 MMOL/L (ref 3.5–5.3)
SODIUM SERPL-SCNC: 141 MMOL/L (ref 135–147)

## 2022-09-06 PROCEDURE — 36415 COLL VENOUS BLD VENIPUNCTURE: CPT

## 2022-09-06 PROCEDURE — 80048 BASIC METABOLIC PNL TOTAL CA: CPT

## 2022-09-13 ENCOUNTER — TELEPHONE (OUTPATIENT)
Dept: NEPHROLOGY | Facility: CLINIC | Age: 82
End: 2022-09-13

## 2022-09-13 DIAGNOSIS — N18.32 STAGE 3B CHRONIC KIDNEY DISEASE (HCC): Primary | ICD-10-CM

## 2022-09-13 NOTE — TELEPHONE ENCOUNTER
I called the patient and we spoke over the phone  Recent laboratory data were reviewed  Lab Results   Component Value Date    SODIUM 141 09/06/2022    K 4 3 09/06/2022     09/06/2022    CO2 30 09/06/2022    BUN 39 (H) 09/06/2022    CREATININE 2 46 (H) 09/06/2022    GLUC 123 09/06/2022    CALCIUM 9 6 09/06/2022     Creatinine is worse  He only took Torsemide 15 mg for 1 day which would not explain the rise in the creatinine  He reports he lowered his salt intake and his edema has not recurred  Plan:   Stay on Torsemide 10 mg daily   Check renal US   Recheck BMP, UA, UPC ratio in 2 weeks

## 2022-09-15 ENCOUNTER — TELEPHONE (OUTPATIENT)
Dept: NEPHROLOGY | Facility: CLINIC | Age: 82
End: 2022-09-15

## 2022-09-15 NOTE — TELEPHONE ENCOUNTER
Pt will schedule renal US on his own  Advised pt to D/C losartan/HCTZ and have repeat labs in two weeks per Melanie Favorite     ----- Message from Adryan Limon MD sent at 9/14/2022  4:58 PM EDT -----  Please have patient get a renal ultrasound  The order is in place and he is aware that I want him to get this  When you call him about it, please have him stop Losartan HCT  He is due for labs in 2 weeks

## 2022-10-12 PROBLEM — Z00.00 MEDICARE ANNUAL WELLNESS VISIT, SUBSEQUENT: Status: RESOLVED | Noted: 2021-04-19 | Resolved: 2022-10-12

## 2022-10-13 DIAGNOSIS — E78.2 MIXED HYPERLIPIDEMIA: ICD-10-CM

## 2022-10-13 DIAGNOSIS — I10 RESISTANT HYPERTENSION: ICD-10-CM

## 2022-10-13 RX ORDER — TORSEMIDE 10 MG/1
TABLET ORAL
Qty: 90 TABLET | Refills: 1 | Status: SHIPPED | OUTPATIENT
Start: 2022-10-13

## 2022-10-14 RX ORDER — CARVEDILOL 12.5 MG/1
TABLET ORAL
Qty: 180 TABLET | Refills: 0 | Status: SHIPPED | OUTPATIENT
Start: 2022-10-14

## 2022-10-24 ENCOUNTER — OFFICE VISIT (OUTPATIENT)
Dept: FAMILY MEDICINE CLINIC | Facility: CLINIC | Age: 82
End: 2022-10-24
Payer: COMMERCIAL

## 2022-10-24 VITALS
DIASTOLIC BLOOD PRESSURE: 82 MMHG | HEART RATE: 65 BPM | OXYGEN SATURATION: 96 % | HEIGHT: 70 IN | WEIGHT: 244.6 LBS | SYSTOLIC BLOOD PRESSURE: 128 MMHG | BODY MASS INDEX: 35.02 KG/M2 | TEMPERATURE: 96.9 F | RESPIRATION RATE: 16 BRPM

## 2022-10-24 DIAGNOSIS — E66.9 DIABETES MELLITUS TYPE 2 IN OBESE (HCC): Primary | ICD-10-CM

## 2022-10-24 DIAGNOSIS — R01.1 HEART MURMUR: ICD-10-CM

## 2022-10-24 DIAGNOSIS — Z12.5 SCREENING FOR MALIGNANT NEOPLASM OF PROSTATE: ICD-10-CM

## 2022-10-24 DIAGNOSIS — I12.9 BENIGN HYPERTENSION WITH CHRONIC KIDNEY DISEASE, STAGE III (HCC): ICD-10-CM

## 2022-10-24 DIAGNOSIS — E11.69 DIABETES MELLITUS TYPE 2 IN OBESE (HCC): Primary | ICD-10-CM

## 2022-10-24 DIAGNOSIS — E66.01 OBESITY, MORBID (HCC): ICD-10-CM

## 2022-10-24 DIAGNOSIS — N18.32 STAGE 3B CHRONIC KIDNEY DISEASE (HCC): ICD-10-CM

## 2022-10-24 DIAGNOSIS — E78.2 MIXED HYPERLIPIDEMIA: ICD-10-CM

## 2022-10-24 DIAGNOSIS — Z23 FLU VACCINE NEED: ICD-10-CM

## 2022-10-24 DIAGNOSIS — N18.30 BENIGN HYPERTENSION WITH CHRONIC KIDNEY DISEASE, STAGE III (HCC): ICD-10-CM

## 2022-10-24 PROCEDURE — 99214 OFFICE O/P EST MOD 30 MIN: CPT

## 2022-10-24 PROCEDURE — 90662 IIV NO PRSV INCREASED AG IM: CPT

## 2022-10-24 PROCEDURE — G0008 ADMIN INFLUENZA VIRUS VAC: HCPCS

## 2022-10-24 NOTE — PROGRESS NOTES
Name: Dallas Tomas      : 1940      MRN: [id number]  Encounter Provider: Asa Pavon MD  Encounter Date: 10/24/2022   Encounter department: [address] [place] [address]     1  Diabetes mellitus type 2 in obese [facility])  Assessment & Plan:    Lab Results   Component Value Date    HGBA1C 6 6 (H) 04/15/2022   Check A1c  Order placed  Check lipid profile    Orders:  -     Lipid panel; Future  -     CBC and differential; Future  -     TSH, 3rd generation; Future  -     HEMOGLOBIN A1C W/ EAG ESTIMATION; Future    2  Benign hypertension with chronic kidney disease, stage III (HCC)    3  Stage 3b chronic kidney disease [facility])  Assessment & Plan:  Lab Results   Component Value Date    EGFR 23 2022    EGFR 26 2022    EGFR 30 04/15/2022    CREATININE 2 46 (H) 2022    CREATININE 2 24 (H) 2022    CREATININE 2 00 (H) 04/15/2022   Gradually worsening chronic kidney disease  Regularly seen by his nephrologist   He has a script for labs from his nephrologist   He is going to get them done tomorrow      4  BMI 33 0-33 9,adult    5  Heart murmur    6  Mixed hyperlipidemia  Assessment & Plan:  Continue pravastatin  Check lipid profile and TSH      7  Screening for malignant neoplasm of prostate  -     PSA, Total Screen; Future    8  Obesity, morbid ([address] )    9  Flu vaccine need  -     influenza vaccine, high-dose, PF 0 7 mL (FLUZONE HIGH-DOSE)    BMI Counseling: Body mass index is 35 1 kg/m²  The BMI is above normal  Nutrition recommendations include decreasing portion sizes, decreasing fast food intake, consuming healthier snacks, limiting drinks that contain sugar, moderation in carbohydrate intake and reducing intake of cholesterol  Exercise recommendations include exercising 3-5 times per week  Rationale for BMI follow-up plan is due to patient being overweight or obese  Falls Plan of Care: balance, strength, and gait training instructions were provided  Subjective      1 htn-- tolerating medicines well  No headache dizziness or lightheadedness  No chest pain or dyspnea  He does have slightly increased edema  No syncope  No cough  No angioedema no muscle cramps  2 ckd- GFR is gradually worsening  He is on torsemide and spironolactone  He is regularly seen by nephrologist   He has a script for BMP from his nephrologist   3 dyslipidemia- no claudication pain  No leg cramps  No abdominal pain nausea or vomiting  No chest pain  4  Dm-2- no polyuria or polydipsia  No increased numbness  No abdominal pain nausea or vomiting  No change in the vision  No chest pain  He has slightly more fatigued    Review of Systems   Constitutional: Negative for chills, fatigue and fever  HENT: Negative for congestion, nosebleeds and rhinorrhea  Eyes: Negative for discharge and visual disturbance  Respiratory: Negative for cough, chest tightness, shortness of breath and wheezing  Cardiovascular: Negative for chest pain  Gastrointestinal: Negative for abdominal distention, abdominal pain, constipation, diarrhea and vomiting  Endocrine: Negative for polydipsia and polyuria  Genitourinary: Negative for difficulty urinating, frequency and hematuria  Musculoskeletal: Negative for myalgias  Skin: Negative for rash  Allergic/Immunologic: Negative for environmental allergies  Neurological: Negative for dizziness, syncope, weakness, light-headedness and headaches  Hematological: Negative  Psychiatric/Behavioral: Negative for agitation, confusion, decreased concentration, dysphoric mood and suicidal ideas  The patient is not nervous/anxious  All other systems reviewed and are negative        Current Outpatient Medications on File Prior to Visit   Medication Sig   • aspirin 325 mg tablet Take 325 mg by mouth daily   • Biotin 5 MG TABS Take by mouth daily   • carvedilol (COREG) 12 5 mg tablet TAKE ONE TABLET BY MOUTH 2 TIMES A DAY   • cholecalciferol (VITAMIN D3) 1,000 units tablet Take 1,000 Units by mouth daily    • Choline Fenofibrate (Fenofibric Acid) 135 MG CPDR TAKE ONE CAPSULE BY MOUTH EVERY DAY   • cloNIDine (CATAPRES-TTS-3) 0 3 mg/24 hr PLACE ONE PATCH (0 3MG TOTAL) ON THE SKIN ONCE A WEEK   • co-enzyme Q-10 30 MG capsule Take 100 mg by mouth daily    • GLUCOSAMINE-CHONDROITIN PO Take 2 tablets by mouth daily    • LUTEIN-ZEAXANTHIN-BILBERRY PO Take by mouth 2 (two) times a day   • milk thistle 175 MG tablet Take 175 mg by mouth daily   • Misc Natural Products (OSTEO BI-FLEX TRIPLE STRENGTH PO) Take by mouth   • multivitamin (THERAGRAN) TABS Take 1 tablet by mouth daily   • Omega-3 Fatty Acids (FISH OIL) 1,000 mg Take 1,000 mg by mouth daily   • pravastatin (PRAVACHOL) 40 mg tablet TAKE ONE TABLET BY MOUTH EVERY DAY   • spironolactone (ALDACTONE) 25 mg tablet TAKE ONE TABLET BY MOUTH EVERY OTHER DAY   • Taurine 500 MG CAPS Take by mouth daily   • thiamine (VITAMIN B1) 100 mg tablet Take 100 mg by mouth daily   • torsemide (DEMADEX) 10 mg tablet TAKE ONE TABLET BY MOUTH EVERY DAY   • Tradjenta 5 MG TABS Take 5 mg by mouth in the morning  • TURMERIC PO Take by mouth     • vitamin E 100 UNIT capsule Take 100 Units by mouth daily         Objective     /82 (BP Location: Left arm, Patient Position: Sitting, Cuff Size: Standard)   Pulse 65   Temp (!) 96 9 °F (36 1 °C) (Temporal)   Resp 16   Ht 5' 10" (1 778 m)   Wt 111 kg (244 lb 9 6 oz)   SpO2 96%   BMI 35 10 kg/m²     Physical Exam  Vitals and nursing note reviewed  Constitutional:       General: He is not in acute distress  Appearance: Normal appearance  He is well-developed  HENT:      Head: Normocephalic and atraumatic  Mouth/Throat:      Mouth: Mucous membranes are moist    Eyes:      General:         Right eye: No discharge  Left eye: No discharge  Neck:      Thyroid: No thyromegaly     Cardiovascular:      Rate and Rhythm: Normal rate and regular rhythm  Pulses: Normal pulses  Heart sounds: Murmur heard  Pulmonary:      Effort: Pulmonary effort is normal       Breath sounds: Normal breath sounds  No wheezing or rhonchi  Abdominal:      General: Bowel sounds are normal  There is no distension  Palpations: Abdomen is soft  Tenderness: There is no abdominal tenderness  Musculoskeletal:         General: No swelling  Cervical back: Neck supple  Right lower leg: Edema present  Left lower leg: Edema present  Lymphadenopathy:      Cervical: No cervical adenopathy  Skin:     General: Skin is warm  Findings: No erythema  Neurological:      General: No focal deficit present  Mental Status: He is alert and oriented to person, place, and time  Psychiatric:         Mood and Affect: Mood normal          Behavior: Behavior normal          Thought Content:  Thought content normal        Negin Ayers MD

## 2022-10-24 NOTE — ASSESSMENT & PLAN NOTE
Lab Results   Component Value Date    EGFR 23 09/06/2022    EGFR 26 07/19/2022    EGFR 30 04/15/2022    CREATININE 2 46 (H) 09/06/2022    CREATININE 2 24 (H) 07/19/2022    CREATININE 2 00 (H) 04/15/2022   Gradually worsening chronic kidney disease    Regularly seen by his nephrologist   He has a script for labs from his nephrologist   He is going to get them done tomorrow

## 2022-10-24 NOTE — ASSESSMENT & PLAN NOTE
Lab Results   Component Value Date    HGBA1C 6 6 (H) 04/15/2022   Check A1c  Order placed    Check lipid profile

## 2022-10-25 ENCOUNTER — APPOINTMENT (OUTPATIENT)
Dept: LAB | Facility: CLINIC | Age: 82
End: 2022-10-25
Payer: COMMERCIAL

## 2022-10-25 DIAGNOSIS — E66.9 DIABETES MELLITUS TYPE 2 IN OBESE (HCC): ICD-10-CM

## 2022-10-25 DIAGNOSIS — N18.32 STAGE 3B CHRONIC KIDNEY DISEASE (HCC): ICD-10-CM

## 2022-10-25 DIAGNOSIS — Z12.5 SCREENING FOR MALIGNANT NEOPLASM OF PROSTATE: ICD-10-CM

## 2022-10-25 DIAGNOSIS — E11.69 DIABETES MELLITUS TYPE 2 IN OBESE (HCC): ICD-10-CM

## 2022-10-25 LAB
ANION GAP SERPL CALCULATED.3IONS-SCNC: 6 MMOL/L (ref 4–13)
BACTERIA UR QL AUTO: ABNORMAL /HPF
BASOPHILS # BLD AUTO: 0.05 THOUSANDS/ÂΜL (ref 0–0.1)
BASOPHILS NFR BLD AUTO: 1 % (ref 0–1)
BILIRUB UR QL STRIP: NEGATIVE
BUN SERPL-MCNC: 30 MG/DL (ref 5–25)
CALCIUM SERPL-MCNC: 9.4 MG/DL (ref 8.3–10.1)
CHLORIDE SERPL-SCNC: 109 MMOL/L (ref 96–108)
CHOLEST SERPL-MCNC: 179 MG/DL
CLARITY UR: CLEAR
CO2 SERPL-SCNC: 25 MMOL/L (ref 21–32)
COLOR UR: YELLOW
CREAT SERPL-MCNC: 2.01 MG/DL (ref 0.6–1.3)
CREAT UR-MCNC: 97.8 MG/DL
EOSINOPHIL # BLD AUTO: 0.12 THOUSAND/ÂΜL (ref 0–0.61)
EOSINOPHIL NFR BLD AUTO: 2 % (ref 0–6)
ERYTHROCYTE [DISTWIDTH] IN BLOOD BY AUTOMATED COUNT: 13.2 % (ref 11.6–15.1)
EST. AVERAGE GLUCOSE BLD GHB EST-MCNC: 154 MG/DL
GFR SERPL CREATININE-BSD FRML MDRD: 30 ML/MIN/1.73SQ M
GLUCOSE P FAST SERPL-MCNC: 157 MG/DL (ref 65–99)
GLUCOSE UR STRIP-MCNC: NEGATIVE MG/DL
HBA1C MFR BLD: 7 %
HCT VFR BLD AUTO: 39.3 % (ref 36.5–49.3)
HDLC SERPL-MCNC: 46 MG/DL
HGB BLD-MCNC: 12.8 G/DL (ref 12–17)
HGB UR QL STRIP.AUTO: NEGATIVE
IMM GRANULOCYTES # BLD AUTO: 0.02 THOUSAND/UL (ref 0–0.2)
IMM GRANULOCYTES NFR BLD AUTO: 0 % (ref 0–2)
KETONES UR STRIP-MCNC: NEGATIVE MG/DL
LDLC SERPL CALC-MCNC: 108 MG/DL (ref 0–100)
LEUKOCYTE ESTERASE UR QL STRIP: NEGATIVE
LYMPHOCYTES # BLD AUTO: 2.18 THOUSANDS/ÂΜL (ref 0.6–4.47)
LYMPHOCYTES NFR BLD AUTO: 33 % (ref 14–44)
MCH RBC QN AUTO: 30.8 PG (ref 26.8–34.3)
MCHC RBC AUTO-ENTMCNC: 32.6 G/DL (ref 31.4–37.4)
MCV RBC AUTO: 95 FL (ref 82–98)
MONOCYTES # BLD AUTO: 0.59 THOUSAND/ÂΜL (ref 0.17–1.22)
MONOCYTES NFR BLD AUTO: 9 % (ref 4–12)
MUCOUS THREADS UR QL AUTO: ABNORMAL
NEUTROPHILS # BLD AUTO: 3.74 THOUSANDS/ÂΜL (ref 1.85–7.62)
NEUTS SEG NFR BLD AUTO: 55 % (ref 43–75)
NITRITE UR QL STRIP: NEGATIVE
NON-SQ EPI CELLS URNS QL MICRO: ABNORMAL /HPF
NONHDLC SERPL-MCNC: 133 MG/DL
NRBC BLD AUTO-RTO: 0 /100 WBCS
PH UR STRIP.AUTO: 6.5 [PH]
PLATELET # BLD AUTO: 203 THOUSANDS/UL (ref 149–390)
PMV BLD AUTO: 10.1 FL (ref 8.9–12.7)
POTASSIUM SERPL-SCNC: 4.2 MMOL/L (ref 3.5–5.3)
PROT UR STRIP-MCNC: ABNORMAL MG/DL
PROT UR-MCNC: 112 MG/DL
PROT/CREAT UR: 1.15 MG/G{CREAT} (ref 0–0.1)
PSA SERPL-MCNC: 2.1 NG/ML (ref 0–4)
RBC # BLD AUTO: 4.15 MILLION/UL (ref 3.88–5.62)
RBC #/AREA URNS AUTO: ABNORMAL /HPF
SODIUM SERPL-SCNC: 140 MMOL/L (ref 135–147)
SP GR UR STRIP.AUTO: 1.01 (ref 1–1.03)
TRIGL SERPL-MCNC: 124 MG/DL
TSH SERPL DL<=0.05 MIU/L-ACNC: 2.38 UIU/ML (ref 0.45–4.5)
UROBILINOGEN UR STRIP-ACNC: <2 MG/DL
WBC # BLD AUTO: 6.7 THOUSAND/UL (ref 4.31–10.16)
WBC #/AREA URNS AUTO: ABNORMAL /HPF

## 2022-10-25 PROCEDURE — 80048 BASIC METABOLIC PNL TOTAL CA: CPT

## 2022-10-25 PROCEDURE — 85025 COMPLETE CBC W/AUTO DIFF WBC: CPT

## 2022-10-25 PROCEDURE — G0103 PSA SCREENING: HCPCS

## 2022-10-25 PROCEDURE — 36415 COLL VENOUS BLD VENIPUNCTURE: CPT

## 2022-10-25 PROCEDURE — 83036 HEMOGLOBIN GLYCOSYLATED A1C: CPT

## 2022-10-25 PROCEDURE — 82570 ASSAY OF URINE CREATININE: CPT

## 2022-10-25 PROCEDURE — 84156 ASSAY OF PROTEIN URINE: CPT

## 2022-10-25 PROCEDURE — 81001 URINALYSIS AUTO W/SCOPE: CPT

## 2022-10-25 PROCEDURE — 80061 LIPID PANEL: CPT

## 2022-10-25 PROCEDURE — 84443 ASSAY THYROID STIM HORMONE: CPT

## 2022-10-26 ENCOUNTER — TELEPHONE (OUTPATIENT)
Dept: NEPHROLOGY | Facility: CLINIC | Age: 82
End: 2022-10-26

## 2022-10-26 NOTE — TELEPHONE ENCOUNTER
I called the patient and left a message on voicemail  Recent laboratory data were reviewed  Lab Results   Component Value Date    SODIUM 140 10/25/2022    K 4 2 10/25/2022     (H) 10/25/2022    CO2 25 10/25/2022    BUN 30 (H) 10/25/2022    CREATININE 2 01 (H) 10/25/2022    GLUC 123 09/06/2022    CALCIUM 9 4 10/25/2022     Renal function better  Creatinine down to 2 01   I informed him to stay on same dose of torsemide and stay off losartan HCT    He has repeat lab work for December 2022

## 2022-11-11 DIAGNOSIS — Z79.4 TYPE 2 DIABETES MELLITUS WITHOUT COMPLICATION, WITH LONG-TERM CURRENT USE OF INSULIN (HCC): ICD-10-CM

## 2022-11-11 DIAGNOSIS — I10 ESSENTIAL HYPERTENSION: ICD-10-CM

## 2022-11-11 DIAGNOSIS — E11.9 TYPE 2 DIABETES MELLITUS WITHOUT COMPLICATION, WITH LONG-TERM CURRENT USE OF INSULIN (HCC): ICD-10-CM

## 2022-11-11 RX ORDER — LINAGLIPTIN 5 MG/1
5 TABLET, FILM COATED ORAL DAILY
Qty: 30 TABLET | Refills: 0 | Status: SHIPPED | OUTPATIENT
Start: 2022-11-11

## 2022-11-11 RX ORDER — CLONIDINE 0.3 MG/24H
PATCH, EXTENDED RELEASE TRANSDERMAL
Qty: 4 PATCH | Refills: 0 | Status: SHIPPED | OUTPATIENT
Start: 2022-11-11

## 2022-11-28 ENCOUNTER — EVALUATION (OUTPATIENT)
Dept: PHYSICAL THERAPY | Facility: CLINIC | Age: 82
End: 2022-11-28

## 2022-11-28 DIAGNOSIS — M76.829 TIBIALIS TENDINITIS, UNSPECIFIED LATERALITY: ICD-10-CM

## 2022-11-28 DIAGNOSIS — M76.821 POSTERIOR TIBIAL TENDINITIS OF RIGHT LEG: Primary | ICD-10-CM

## 2022-11-28 DIAGNOSIS — E78.2 MIXED HYPERLIPIDEMIA: ICD-10-CM

## 2022-11-28 RX ORDER — PRAVASTATIN SODIUM 40 MG
TABLET ORAL
Qty: 90 TABLET | Refills: 0 | Status: SHIPPED | OUTPATIENT
Start: 2022-11-28

## 2022-11-28 NOTE — PROGRESS NOTES
PT Evaluation     Today's date: 2022  Patient name: Jose Allne  : 1940  MRN: [id number]  Referring provider: Ursula Locke*  Dx:   Encounter Diagnosis     ICD-10-CM    1  Posterior tibial tendinitis of right leg  M76 821       2  Tibialis tendinitis, unspecified laterality  M76 829           Start Time: 7341  Stop Time: 5  Total time in clinic (min): 45 minutes    Assessment  Assessment details: Patient is a [de-identified] y o  male who presents to outpatient PT with reports of Right ankle pain that has been chronic in nature  Patient presents with pain primarily in medial Right ankle region that is painful to palpation around posterior tibialis tendon and medial malleoulus regions  Patient noted to have pain and limitations in ankle inversion mobility, as well as some muscle weakness with ankle inversion  This affects his ability and stability with walking on varying terrains and conditions, uneven surfaces, etc  Patient noted to have pain primarily with increased Right LE weight-bearing tasks such as walking after prolonged sitting, sit-stands, stair negotiation, etc  Patient uses single point cane as needed, but primarily walks around house without AD  Patient noted to have increased foot/ankle pronation upon Right weight-bearing, with collapsed medial arch noted from short-sitting to standing  Patient noted to have reduced intrinsic muscle/great toe flexor muscle strength, indicating reduced medial support upon weight-bearing  Thus, his current impairments appear to be more mechanical in nature, with reproduction of pain primarily to palpation of posterior tib tendon due to possible tendonitis, otherwise with weight-bearing tasks as mentioned above  Physical therapy interventions with focus on improving calf and lower leg muscle flexibility, intrinsic and lower leg muscle strength, balance on uneven surface, and overall better motor control with functional tasks as mentioned above   Thus, he will benefit from skilled PT services to address his impaimrnets for better stability, mechanics, and ease with ADLs, work-related tasks, and other functional activities  Thank you for the referral     Impairments: abnormal gait, abnormal or restricted ROM, activity intolerance, impaired balance, impaired physical strength, lacks appropriate home exercise program, pain with function, weight-bearing intolerance, poor posture  and poor body mechanics  Functional limitations: sit-stands, standing, stair negotiation, walking after prolonged sittingBarriers to therapy: Chronicity of symptoms, medical history  Understanding of Dx/Px/POC: good   Prognosis: good    Goals  Impairment Goals 4-6 weeks   In order to maximize function patient will be able to      - Decrease intensity/duration/frequency of pain to 4/10  - Demonstrate symmetrical ankle AROM without pain to improve mobility and mechanics with walking and sit-stand transfers  - Increase ankle strength to 4/5 throughout to improve stability with prolonged walking, sit-stand transfers, and on uneven surfaces  - Demonstrate a tandem stance bilaterally to 30 sec to improve stability on varying surfaces and conditions  Functional Goals 6-8 weeks  In order to return to prior level of function patient will be able to      - Participate in ADL's/IADL's/sport specific activities with no greater than 2/10 pain  - Increase Functional Status Measure (FOTO) to: anticipated at discharge  - Demonstrate independence and compliant with HEP  - Demonstrate a squat and or sit to stand with good mechanics and eccentric control without pain/difficulty/compensation  - Ascend and descend stairs without increased pain/compensation/difficulty and a reciprocal gait pattern  - Patient will be able to demonstrate good gait mechanics without compensations     - Demonstrate ankle stability on even and uneven surfaces as seen by minimal to no compensations or LOB in order to progress improved safety and reduce falls risk on varying terrain/environments  Plan  Patient would benefit from: skilled PT  Planned modality interventions: cryotherapy  Other planned modality interventions: moist heat  Planned therapy interventions: joint mobilization, manual therapy, neuromuscular re-education, patient education, strengthening, stretching, therapeutic activities, therapeutic exercise, home exercise program, functional ROM exercises, C[place]lin taping, postural training, balance/weight bearing training, body mechanics training, flexibility, massage, gait training and transfer training  Frequency: 1-2x/week  Duration in weeks: 4  Treatment plan discussed with: patient        Subjective Evaluation    History of Present Illness  Mechanism of injury: Domonique Young is a [de-identified]y o  year-old male who presents with Right ankle pain that he reports is chronic in nature  Patient reports he "lived with the pain" for many years that gradually worsened  Patient reports he has noticed difficulty with walking on uneven surfaces  He reports he was able to [place] distances in the mall, however recently has noted pain and difficulty  Patient saw podiatrist at [place] [address] [facility]  and received x-ray that revealed "collapsing medial arch and flat foot"  Patient reports doctor also told him the tibia was putting pressure and causing rotation at ankle joint  Patient was referred for physical therapy at this time  He has a follow up scheduled in 2023  Patient reports no history of accident or fall  He reports independent with ADLs and household chores, with assistance from wife as needed    Quality of life: good    Pain  Current pain ratin  At best pain ratin  At worst pain ratin  Location: Right ankle  Quality: sharp  Aggravating factors: standing, stair climbing and walking  Progression: worsening    Social Support  Steps to enter house: yes (without railing)  3  Stairs in house: yes (with railing)   12  Lives in: one-story house  Lives with: spouse    Employment status: working (owns business)    Diagnostic Tests  X-ray: abnormal (11/23/2022: Right ankle)  Treatments  Current treatment: physical therapy  Patient Goals  Patient goals for therapy: decreased pain, increased motion, improved balance, increased strength and independence with ADLs/IADLs          Objective     Observations     Additional Observation Details  Standing Posture: Patient noted to have pes planus bilaterally, however more severe on Right foot with significant pronation and hindfoot valgus noted  Sitting Posture: Patient noted to have supination of Right foot more than Left, with collapsed medial arch noted upon weight-bearing/standing  Gait Assessment: with SPC occasionally otherwise without AD, decreased toe-off in terminal stance and slight limp noted on Right LE  Mild swelling noted due to venous insufficiency, but no pitting edema (patient reports elevating feet at night and using compression stockings)    Palpation     Right   Tenderness of the medial gastrocnemius and posterior tibialis  Tenderness     Right Ankle/Foot   Tenderness in the Achilles insertion, deltoid ligament, medial malleolus, mid-plantar aspect, posterior tibial tendon and talar dome  Active Range of Motion   Left Ankle/Foot   Normal active range of motion    Right Ankle/Foot   Dorsiflexion (kf): -5 degrees   Plantar flexion: 40 degrees   Inversion: 10 degrees with pain  Eversion: 20 degrees     Joint Play     Right Ankle/Foot  Hypomobile in the talocrural joint and subtalar joint  Strength/Myotome Testing     Left Ankle/Foot   Normal strength    Right Ankle/Foot   Dorsiflexion: 4-  Plantar flexion: 3+  Inversion: 3+  Eversion: 4-  Great toe flexion: 3  Great toe extension: 3+    Tests     Right Ankle/Foot   Positive for navicular drop and windlass     Negative for anterior drawer, eversion talar tilt, inversion talar tilt and posterior drawer       Additional Tests Details  Rhomberg on foam: EO 30 sec, EC 5 sec  Tandem on floor: R/L 15 sec, L/R 4 sec  Single Leg Balance: <2 sec bilaterally             Diagnosis: Right ankle pain, pes planus  Precautions: DMII, HTN, cardiac cath, venous insufficiency, obesity  ( * asterisk indicates given per HEP)  Next Physician Appointment: unknown    Manuals 11/28         STM          IASTM          Joint Mobs                    Neuro Re-Ed          Towel curls*          Tandem amb          Rhomberg on foam                                                  Ther Ex          Calf stretch*          Heel raises*          Post tib heel raises*          Short foot          Ankle inv TB                              Ther Activity          bike          STS practice                    Pt Ed HEP, POC         Re-Evaluation          Modalities          Heat/ice (PRN)

## 2022-11-28 NOTE — LETTER
2022    Blair Lambert, [place]  [address] ,  [address] [facility]    Patient: Darshan Gorman   YOB: 1940   Date of Visit: 2022     Encounter Diagnosis     ICD-10-CM    1  Posterior tibial tendinitis of right leg  M76 821       2  Tibialis tendinitis, unspecified laterality  F64 [id number]           Dear Dr Iesha Zamudio: Thank you for your recent referral of Darshan Gorman  Please review the attached evaluation summary from Jeremie's recent visit  Please verify that you agree with the plan of care by signing the attached order  If you have any questions or concerns, please do not hesitate to call  I sincerely appreciate the opportunity to share in the care of one of your patients and hope to have another opportunity to work with you in the near future  Sincerely,    Irais Pino, PT      Referring Provider:      I certify that I have read the below Plan of Care and certify the need for these services furnished under this plan of treatment while under my care  Hortensia Zamudio, [place]  [address] ,  [address] [place] [address]  Via Fax: [phone]          PT Evaluation     Today's date: 2022  Patient name: Darshan Gorman  : 1940  MRN: [id number]  Referring provider: Louisa Olivo*  Dx:   Encounter Diagnosis     ICD-10-CM    1  Posterior tibial tendinitis of right leg  M76 821       2  Tibialis tendinitis, unspecified laterality  M76 829           Start Time: 4091  Stop Time: 1815  Total time in clinic (min): 45 minutes    Assessment  Assessment details: Patient is a [de-identified] y o  male who presents to outpatient PT with reports of Right ankle pain that has been chronic in nature  Patient presents with pain primarily in medial Right ankle region that is painful to palpation around posterior tibialis tendon and medial malleoulus regions   Patient noted to have pain and limitations in ankle inversion mobility, as well as some muscle weakness with ankle inversion  This affects his ability and stability with walking on varying terrains and conditions, uneven surfaces, etc  Patient noted to have pain primarily with increased Right LE weight-bearing tasks such as walking after prolonged sitting, sit-stands, stair negotiation, etc  Patient uses single point cane as needed, but primarily walks around house without AD  Patient noted to have increased foot/ankle pronation upon Right weight-bearing, with collapsed medial arch noted from short-sitting to standing  Patient noted to have reduced intrinsic muscle/great toe flexor muscle strength, indicating reduced medial support upon weight-bearing  Thus, his current impairments appear to be more mechanical in nature, with reproduction of pain primarily to palpation of posterior tib tendon due to possible tendonitis, otherwise with weight-bearing tasks as mentioned above  Physical therapy interventions with focus on improving calf and lower leg muscle flexibility, intrinsic and lower leg muscle strength, balance on uneven surface, and overall better motor control with functional tasks as mentioned above  Thus, he will benefit from skilled PT services to address his impaimrnets for better stability, mechanics, and ease with ADLs, work-related tasks, and other functional activities  Thank you for the referral     Impairments: abnormal gait, abnormal or restricted ROM, activity intolerance, impaired balance, impaired physical strength, lacks appropriate home exercise program, pain with function, weight-bearing intolerance, poor posture  and poor body mechanics  Functional limitations: sit-stands, standing, stair negotiation, walking after prolonged sittingBarriers to therapy: Chronicity of symptoms, medical history  Understanding of Dx/Px/POC: good   Prognosis: good    Goals  Impairment Goals 4-6 weeks   In order to maximize function patient will be able to      - Decrease intensity/duration/frequency of pain to 4/10  - Demonstrate symmetrical ankle AROM without pain to improve mobility and mechanics with walking and sit-stand transfers  - Increase ankle strength to 4/5 throughout to improve stability with prolonged walking, sit-stand transfers, and on uneven surfaces  - Demonstrate a tandem stance bilaterally to 30 sec to improve stability on varying surfaces and conditions  Functional Goals 6-8 weeks  In order to return to prior level of function patient will be able to      - Participate in ADL's/IADL's/sport specific activities with no greater than 2/10 pain  - Increase Functional Status Measure (FOTO) to: anticipated at discharge  - Demonstrate independence and compliant with HEP  - Demonstrate a squat and or sit to stand with good mechanics and eccentric control without pain/difficulty/compensation  - Ascend and descend stairs without increased pain/compensation/difficulty and a reciprocal gait pattern  - Patient will be able to demonstrate good gait mechanics without compensations  - Demonstrate ankle stability on even and uneven surfaces as seen by minimal to no compensations or LOB in order to progress improved safety and reduce falls risk on varying terrain/environments  Plan  Patient would benefit from: skilled PT  Planned modality interventions: cryotherapy  Other planned modality interventions: moist heat  Planned therapy interventions: joint mobilization, manual therapy, neuromuscular re-education, patient education, strengthening, stretching, therapeutic activities, therapeutic exercise, home exercise program, functional ROM exercises, Coughlin taping, postural training, balance/weight bearing training, body mechanics training, flexibility, massage, gait training and transfer training  Frequency: 1-2x/week    Duration in weeks: 4  Treatment plan discussed with: patient        Subjective Evaluation    History of Present Illness  Mechanism of injury: Cory Ewing is a [de-identified]y o  year-old male who presents with Right ankle pain that he reports is chronic in nature  Patient reports he "lived with the pain" for many years that gradually worsened  Patient reports he has noticed difficulty with walking on uneven surfaces  He reports he was able to walk long distances in the mall, however recently has noted pain and difficulty  Patient saw podiatrist at [place] [id number] [facility] on  and received x-ray that revealed "collapsing medial arch and flat foot"  Patient reports doctor also told him the tibia was putting pressure and causing rotation at ankle joint  Patient was referred for physical therapy at this time  He has a follow up scheduled in 2023  Patient reports no history of accident or fall  He reports independent with ADLs and household chores, with assistance from wife as needed  Quality of life: good    Pain  Current pain ratin  At best pain ratin  At worst pain ratin  Location: Right ankle  Quality: sharp  Aggravating factors: standing, stair climbing and walking  Progression: worsening    Social Support  Steps to enter house: yes (without railing)  3  Stairs in house: yes (with railing)   12  Lives in: one-story house  Lives with: spouse    Employment status: working (owns business)    Diagnostic Tests  X-ray: abnormal (2022: Right ankle)  Treatments  Current treatment: physical therapy  Patient Goals  Patient goals for therapy: decreased pain, increased motion, improved balance, increased strength and independence with ADLs/IADLs          Objective     Observations     Additional Observation Details  Standing Posture: Patient noted to have pes planus bilaterally, however more severe on Right foot with significant pronation and hindfoot valgus noted     Sitting Posture: Patient noted to have supination of Right foot more than Left, with collapsed medial arch noted upon weight-bearing/standing  Gait Assessment: with SPC occasionally otherwise without AD, decreased toe-off in terminal stance and slight limp noted on Right LE  Mild swelling noted due to venous insufficiency, but no pitting edema (patient reports elevating feet at night and using compression stockings)    Palpation     Right   Tenderness of the medial gastrocnemius and posterior tibialis  Tenderness     Right Ankle/Foot   Tenderness in the Achilles insertion, deltoid ligament, medial malleolus, mid-plantar aspect, posterior tibial tendon and talar dome  Active Range of Motion   Left Ankle/Foot   Normal active range of motion    Right Ankle/Foot   Dorsiflexion (kf): -5 degrees   Plantar flexion: 40 degrees   Inversion: 10 degrees with pain  Eversion: 20 degrees     Joint Play     Right Ankle/Foot  Hypomobile in the talocrural joint and subtalar joint  Strength/Myotome Testing     Left Ankle/Foot   Normal strength    Right Ankle/Foot   Dorsiflexion: 4-  Plantar flexion: 3+  Inversion: 3+  Eversion: 4-  Great toe flexion: 3  Great toe extension: 3+    Tests     Right Ankle/Foot   Positive for navicular drop and windlass  Negative for anterior drawer, eversion talar tilt, inversion talar tilt and posterior drawer       Additional Tests Details  Rhomberg on foam: EO 30 sec, EC 5 sec  Tandem on floor: R/L 15 sec, L/R 4 sec  Single Leg Balance: <2 sec bilaterally            Diagnosis: Right ankle pain, pes planus  Precautions: DMII, HTN, cardiac cath, venous insufficiency, obesity  ( * asterisk indicates given per HEP)  Next Physician Appointment: unknown    Manuals 11/28         STM          IAS          Joint Mobs                    Neuro Re-Ed          Towel curls*          Tandem amb          Rhomberg on foam                                                  Ther Ex          Calf stretch*          Heel raises*          Post tib heel raises*          Short foot          Ankle inv TB                              Ther Activity          bike          STS practice Pt Ed HEP, POC         Re-Evaluation          Modalities          Heat/ice (PRN)

## 2022-12-02 ENCOUNTER — OFFICE VISIT (OUTPATIENT)
Dept: PHYSICAL THERAPY | Facility: CLINIC | Age: 82
End: 2022-12-02

## 2022-12-02 DIAGNOSIS — M76.829 TIBIALIS TENDINITIS, UNSPECIFIED LATERALITY: ICD-10-CM

## 2022-12-02 DIAGNOSIS — M76.821 POSTERIOR TIBIAL TENDINITIS OF RIGHT LEG: Primary | ICD-10-CM

## 2022-12-02 NOTE — PROGRESS NOTES
Daily Note     Today's date: 2022  Patient name: Biju Charles  : 1940  MRN: [id number]  Referring provider: Lola Griggs*  Dx:   Encounter Diagnosis     ICD-10-CM    1  Posterior tibial tendinitis of right leg  M76 821       2  Tibialis tendinitis, unspecified laterality  M76 829           Start Time: 4161  Stop Time: 4422  Total time in clinic (min): 45 minutes    Subjective: Patient reports he is feeling a little better today  He reports he practiced some of the exercises at home and had some pain with standing towel curls of Right foot  Objective: See treatment diary below      Assessment: Tolerated treatment well  Initiated session with gentle STM to medial Right ankle/posterior tib tendon region  Added ankle inversion strengthening to improve muscle activation/isolation and strength  Also added post tib heel raises with ball squeeze between heels  Ended session with stationary bike without pain or discomfort  Patient exhibited good technique with therapeutic exercises and would benefit from continued PT      Plan: Continue per plan of care  Progress treatment as tolerated         Diagnosis: Right ankle pain, pes planus  Precautions: DMII, HTN, cardiac cath, venous insufficiency, obesity  ( * asterisk indicates given per HEP)  Next Physician Appointment: unknown    Manuals         STM  DK        IASTM          Joint Mobs  DK                  Neuro Re-Ed          Towel curls*  Seated 2x10        Tandem amb          Rhomberg on foam                                                  Ther Ex          Calf stretch*  10"x10        Heel raises*  2x10        Post tib heel raises*  With ball squeeze 2x10        Short foot  2x10        Ankle inv TB  GTB 2x10                            Ther Activity          bike  stationary (seat 7) x5min        STS practice                    Pt Ed HEP, POC         Re-Evaluation          Modalities          Heat/ice (PRN)

## 2022-12-05 ENCOUNTER — OFFICE VISIT (OUTPATIENT)
Dept: PHYSICAL THERAPY | Facility: CLINIC | Age: 82
End: 2022-12-05

## 2022-12-05 DIAGNOSIS — M76.821 POSTERIOR TIBIAL TENDINITIS OF RIGHT LEG: Primary | ICD-10-CM

## 2022-12-05 DIAGNOSIS — M76.829 TIBIALIS TENDINITIS, UNSPECIFIED LATERALITY: ICD-10-CM

## 2022-12-05 NOTE — PROGRESS NOTES
Daily Note     Today's date: 2022  Patient name: Ton Jay  : 1940  MRN: [id number]  Referring provider: Jayden Persaud*  Dx:   Encounter Diagnosis     ICD-10-CM    1  Posterior tibial tendinitis of right leg  M76 821       2  Tibialis tendinitis, unspecified laterality  M76 829           Start Time: 8184  Stop Time: 1830  Total time in clinic (min): 45 minutes    Subjective: Patient reports he was hurting a bit today  Objective: See treatment diary below      Assessment: Tolerated treatment well  IASTM gentle to medial Right foot/post tib tendon (indirect)  Gentle TC and subtalar JMs to address restrictions and for pain  Patient reported positive response with reduced reports of pain after manual treatment  Continues to have limitations in mobility with heel raises  Cuing for performing all exercises within pain-free ROM  Added tandem walking end of session  Patient was challenged to maintain stability, but no increase in pain  Patient exhibited good technique with therapeutic exercises and would benefit from continued PT      Plan: Continue per plan of care  Progress treatment as tolerated         Diagnosis: Right ankle pain, pes planus  Precautions: DMII, HTN, cardiac cath, venous insufficiency, obesity  ( * asterisk indicates given per HEP)  Next Physician Appointment: unknown    Manuals        STM  DK DK       IASTM   DK       Joint Mobs  DK DK                 Neuro Re-Ed          Towel curls*  Seated 2x10 Seated 2x10       Tandem amb   FWD x2 laps       Rhomberg on foam                                                  Ther Ex          Calf stretch*  10"x10 10"x10       Heel raises*  2x10 2x10       Post tib heel raises*  With ball squeeze 2x10 With ball squeeze 2x10       Short foot  2x10 2x10       Ankle inv TB  GTB 2x10 GTB 2x10                           Ther Activity          bike  stationary (seat 7) x5min Recum x5min       STS practice                    Pt Ed HEP, POC         Re-Evaluation          Modalities          Heat/ice (PRN)

## 2022-12-07 ENCOUNTER — OFFICE VISIT (OUTPATIENT)
Dept: PHYSICAL THERAPY | Facility: CLINIC | Age: 82
End: 2022-12-07

## 2022-12-07 DIAGNOSIS — M76.829 TIBIALIS TENDINITIS, UNSPECIFIED LATERALITY: ICD-10-CM

## 2022-12-07 DIAGNOSIS — M76.821 POSTERIOR TIBIAL TENDINITIS OF RIGHT LEG: Primary | ICD-10-CM

## 2022-12-07 NOTE — PROGRESS NOTES
Daily Note     Today's date: 2022  Patient name: Jaden Pacheco  : 1940  MRN: [id number]  Referring provider: Rangel Santos  Dx:   Encounter Diagnosis     ICD-10-CM    1  Posterior tibial tendinitis of right leg  M76 821       2  Tibialis tendinitis, unspecified laterality  M76 829           Start Time: 1400  Stop Time: 4841  Total time in clinic (min): 45 minutes    Subjective: Patient reports he is feeling better today  Objective: See treatment diary below      Assessment: Tolerated treatment well  Positive response to manual treatment with reports of some pain relief  Progressed towel curls to "frog"  seated  patient was challenged slightly, however able to do with good mechanics and no increased pain  Progressed tandem walking to perform forward and backwards, with slight challenged but able to maintain stability with light UE support and no LOB  Patient exhibited good technique with therapeutic exercises and would benefit from continued PT      Plan: Continue per plan of care  Progress treatment as tolerated         Diagnosis: Right ankle pain, pes planus  Precautions: DMII, HTN, cardiac cath, venous insufficiency, obesity  ( * asterisk indicates given per HEP)  Next Physician Appointment: unknown    Manuals       STM  DK DK DK      IASTM   DK DK      Joint Mobs  DK DK DK                Neuro Re-Ed          Towel curls*  Seated 2x10 Seated 2x10       Frog     x5min      Tandem amb   FWD x2 laps FWD/BWD x3 laps      Rhomberg on foam                                                  Ther Ex          Calf stretch*  10"x10 10"x10 10"x10      Heel raises*  2x10 2x10 2x10      Post tib heel raises*  With ball squeeze 2x10 With ball squeeze 2x10 With ball squeeze 2x10      Short foot  2x10 2x10 2x10      Ankle inv TB  GTB 2x10 GTB 2x10 GTB 2x10                          Ther Activity          bike  stationary (seat 7) x5min Recum x5min Recum x5min      STS practice                    Pt Ed HEP, POC         Re-Evaluation          Modalities          Heat/ice (PRN)

## 2022-12-12 ENCOUNTER — OFFICE VISIT (OUTPATIENT)
Dept: PHYSICAL THERAPY | Facility: CLINIC | Age: 82
End: 2022-12-12

## 2022-12-12 ENCOUNTER — APPOINTMENT (OUTPATIENT)
Dept: LAB | Facility: CLINIC | Age: 82
End: 2022-12-12

## 2022-12-12 DIAGNOSIS — M76.829 TIBIALIS TENDINITIS, UNSPECIFIED LATERALITY: ICD-10-CM

## 2022-12-12 DIAGNOSIS — N18.32 STAGE 3B CHRONIC KIDNEY DISEASE (HCC): ICD-10-CM

## 2022-12-12 DIAGNOSIS — M76.821 POSTERIOR TIBIAL TENDINITIS OF RIGHT LEG: Primary | ICD-10-CM

## 2022-12-12 LAB
ALBUMIN SERPL BCP-MCNC: 3.6 G/DL (ref 3.5–5)
ANION GAP SERPL CALCULATED.3IONS-SCNC: 5 MMOL/L (ref 4–13)
BUN SERPL-MCNC: 36 MG/DL (ref 5–25)
CALCIUM SERPL-MCNC: 9.3 MG/DL (ref 8.3–10.1)
CHLORIDE SERPL-SCNC: 110 MMOL/L (ref 96–108)
CO2 SERPL-SCNC: 27 MMOL/L (ref 21–32)
CREAT SERPL-MCNC: 1.94 MG/DL (ref 0.6–1.3)
CREAT UR-MCNC: 87.2 MG/DL
GFR SERPL CREATININE-BSD FRML MDRD: 31 ML/MIN/1.73SQ M
GLUCOSE SERPL-MCNC: 146 MG/DL (ref 65–140)
MAGNESIUM SERPL-MCNC: 2.2 MG/DL (ref 1.6–2.6)
PHOSPHATE SERPL-MCNC: 3.3 MG/DL (ref 2.3–4.1)
POTASSIUM SERPL-SCNC: 3.8 MMOL/L (ref 3.5–5.3)
PROT UR-MCNC: 111 MG/DL
PROT/CREAT UR: 1.27 MG/G{CREAT} (ref 0–0.1)
SODIUM SERPL-SCNC: 142 MMOL/L (ref 135–147)

## 2022-12-12 NOTE — PROGRESS NOTES
Daily Note     Today's date: 2022  Patient name: Andrea Reyes  : 1940  MRN: [id number]  Referring provider: Jose Rodriguez*  Dx:   Encounter Diagnosis     ICD-10-CM    1  Posterior tibial tendinitis of right leg  M76 821       2  Tibialis tendinitis, unspecified laterality  M76 829           Start Time: 9039  Stop Time: 1700  Total time in clinic (min): 45 minutes    Subjective: Patient reports he is feeling better today  Objective: See treatment diary below      Assessment: Tolerated treatment well  Patient presents with reduced TTP medial Right foot  TC and subtalar mobs to tolerance  Performed frog  with improved mobility and no pain  Patient continues to be challenged with Tandem walking, however able to complete with light UE support to avoid LOB  Patient exhibited good technique with therapeutic exercises and would benefit from continued PT      Plan: Continue per plan of care  Progress treatment as tolerated         Diagnosis: Right ankle pain, pes planus  Precautions: DMII, HTN, cardiac cath, venous insufficiency, obesity  ( * asterisk indicates given per HEP)  Next Physician Appointment: unknown    Manuals      STM  DK DK DK DK     IASTM   DK DK DK     Joint Mobs  DK DK DK DK               Neuro Re-Ed          Towel curls*  Seated 2x10 Seated 2x10       Frog     x5min x5min     Tandem amb   FWD x2 laps FWD/BWD x3 laps FWD/BWD x3 laps     Rhomberg on foam                                                  Ther Ex          Calf stretch*  10"x10 10"x10 10"x10 10"x10     Heel raises*  2x10 2x10 2x10 2x10     Post tib heel raises*  With ball squeeze 2x10 With ball squeeze 2x10 With ball squeeze 2x10 With ball squeeze 2x10     Short foot  2x10 2x10 2x10      Ankle inv TB  GTB 2x10 GTB 2x10 GTB 2x10 GTB 2x10                         Ther Activity          bike  stationary (seat 7) x5min Recum x5min Recum x5min Recum x5min     STS practice Pt Ed HEP, POC         Re-Evaluation          Modalities          Heat/ice (PRN)

## 2022-12-14 ENCOUNTER — OFFICE VISIT (OUTPATIENT)
Dept: PHYSICAL THERAPY | Facility: CLINIC | Age: 82
End: 2022-12-14

## 2022-12-14 DIAGNOSIS — M76.821 POSTERIOR TIBIAL TENDINITIS OF RIGHT LEG: Primary | ICD-10-CM

## 2022-12-14 DIAGNOSIS — I10 ESSENTIAL HYPERTENSION: ICD-10-CM

## 2022-12-14 DIAGNOSIS — M76.829 TIBIALIS TENDINITIS, UNSPECIFIED LATERALITY: ICD-10-CM

## 2022-12-14 NOTE — PROGRESS NOTES
Daily Note     Today's date: 2022  Patient name: Linn Gautam  : 1940  MRN: [id number]  Referring provider: Leonila Mcdowell*  Dx:   Encounter Diagnosis     ICD-10-CM    1  Posterior tibial tendinitis of right leg  M76 821       2  Tibialis tendinitis, unspecified laterality  M76 829           Start Time: 1200  Stop Time: 1729  Total time in clinic (min): 45 minutes    Subjective: Patient reports he has been without pain for past few days  Objective: See treatment diary below      Assessment: Tolerated treatment well  Progressed ankle inversion to blue TB with good tolerance and slight muscle soreness noted that relieved with rest  Added 2 cone tap from foam and wobble board exercises to further challenge and progress ankle stability on varying surfaces/conditions  Completed session with good effort and without increased pain  Patient exhibited good technique with therapeutic exercises and would benefit from continued PT      Plan: Continue per plan of care  Progress treatment as tolerated         Diagnosis: Right ankle pain, pes planus  Precautions: DMII, HTN, cardiac cath, venous insufficiency, obesity  ( * asterisk indicates given per HEP)  Next Physician Appointment: unknown    Manuals     STM  DK DK DK DK DK    IASTM   DK DK DK DK    Joint Mobs  DK DK DK DK DK              Neuro Re-Ed          Towel curls*  Seated 2x10 Seated 2x10       Frog     x5min x5min     Tandem amb   FWD x2 laps FWD/BWD x3 laps FWD/BWD x3 laps FWD/BWD x3 laps    Rhomberg on foam          Foam cone taps      2cone x15    Wobble Board      A/P x1min, Lat x1min                        Ther Ex          Calf stretch*  10"x10 10"x10 10"x10 10"x10 10"x10    Heel raises*  2x10 2x10 2x10 2x10 2x10    Post tib heel raises*  With ball squeeze 2x10 With ball squeeze 2x10 With ball squeeze 2x10 With ball squeeze 2x10 With ball squeeze 2x10    Short foot  2x10 2x10 2x10      Ankle inv TB  GTB 2x10 GTB 2x10 GTB 2x10 GTB 2x10 Blue TB 2x10                        Ther Activity          bike  stationary (seat 7) x5min Recum x5min Recum x5min Recum x5min Recum x5min    STS practice                    Pt Ed HEP, POC         Re-Evaluation          Modalities          Heat/ice (PRN)

## 2022-12-15 RX ORDER — CLONIDINE 0.3 MG/24H
PATCH, EXTENDED RELEASE TRANSDERMAL
Qty: 4 PATCH | Refills: 0 | Status: SHIPPED | OUTPATIENT
Start: 2022-12-15

## 2022-12-17 DIAGNOSIS — Z79.4 TYPE 2 DIABETES MELLITUS WITHOUT COMPLICATION, WITH LONG-TERM CURRENT USE OF INSULIN (HCC): ICD-10-CM

## 2022-12-17 DIAGNOSIS — E11.9 TYPE 2 DIABETES MELLITUS WITHOUT COMPLICATION, WITH LONG-TERM CURRENT USE OF INSULIN (HCC): ICD-10-CM

## 2022-12-19 ENCOUNTER — OFFICE VISIT (OUTPATIENT)
Dept: PHYSICAL THERAPY | Facility: CLINIC | Age: 82
End: 2022-12-19

## 2022-12-19 DIAGNOSIS — M76.821 POSTERIOR TIBIAL TENDINITIS OF RIGHT LEG: Primary | ICD-10-CM

## 2022-12-19 DIAGNOSIS — M76.829 TIBIALIS TENDINITIS, UNSPECIFIED LATERALITY: ICD-10-CM

## 2022-12-19 RX ORDER — LINAGLIPTIN 5 MG/1
5 TABLET, FILM COATED ORAL DAILY
Qty: 30 TABLET | Refills: 0 | Status: SHIPPED | OUTPATIENT
Start: 2022-12-19

## 2022-12-19 NOTE — PROGRESS NOTES
Daily Note     Today's date: 2022  Patient name: Aline Green  : 1940  MRN: [id number]  Referring provider: Rex Case*  Dx:   Encounter Diagnosis     ICD-10-CM    1  Posterior tibial tendinitis of right leg  M76 821       2  Tibialis tendinitis, unspecified laterality  M76 829           Start Time: 3301  Stop Time: 1700  Total time in clinic (min): 45 minutes    Subjective: Patient reports no new changes on arrival  He reports no significant pain or soreness noted after last session  Objective: See treatment diary below      Assessment: Tolerated treatment well  Improving joint mobility noted with reduced TTP at medial Right foot  Improving stability with SL balance on foam surface, however continues to require light UE support to maintain stability  Tandem amb with improved mechanics and stability  Cuing to maintain proper upright posture throughout standing treatment exercises for better mechanics, stability, and overall postural mechanics  Patient exhibited good technique with therapeutic exercises and would benefit from continued PT      Plan: Continue per plan of care  Progress treatment as tolerated         Diagnosis: Right ankle pain, pes planus  Precautions: DMII, HTN, cardiac cath, venous insufficiency, obesity  ( * asterisk indicates given per HEP)  Next Physician Appointment: unknown    Manuals    STM  DK DK DK DK DK DK   IASTM   DK DK DK DK DK   Joint Mobs  DK DK DK DK DK DK             Neuro Re-Ed          Towel curls*  Seated 2x10 Seated 2x10       Frog     x5min x5min     Tandem amb   FWD x2 laps FWD/BWD x3 laps FWD/BWD x3 laps FWD/BWD x3 laps FWD/BWD x3 laps   Rhomberg on foam          Foam cone taps      2cone x15 2cone 2x10   Wobble Board      A/P x1min, Lat x1min A/P x1min, Lat x1min                       Ther Ex          Calf stretch*  10"x10 10"x10 10"x10 10"x10 10"x10 10"x10   Heel raises*  2x10 2x10 2x10 2x10 2x10 2x10   Post tib heel raises*  With ball squeeze 2x10 With ball squeeze 2x10 With ball squeeze 2x10 With ball squeeze 2x10 With ball squeeze 2x10 With ball squeeze 2x10   Short foot  2x10 2x10 2x10      Ankle inv TB  GTB 2x10 GTB 2x10 GTB 2x10 GTB 2x10 Blue TB 2x10 Blue TB 2x10                       Ther Activity          bike  stationary (seat 7) x5min Recum x5min Recum x5min Recum x5min Recum x5min Recum x5min   STS practice                    Pt Ed HEP, POC         Re-Evaluation          Modalities          Heat/ice (PRN)

## 2022-12-21 ENCOUNTER — OFFICE VISIT (OUTPATIENT)
Dept: PHYSICAL THERAPY | Facility: CLINIC | Age: 82
End: 2022-12-21

## 2022-12-21 DIAGNOSIS — M76.829 TIBIALIS TENDINITIS, UNSPECIFIED LATERALITY: ICD-10-CM

## 2022-12-21 DIAGNOSIS — M76.821 POSTERIOR TIBIAL TENDINITIS OF RIGHT LEG: Primary | ICD-10-CM

## 2022-12-21 NOTE — PROGRESS NOTES
Daily Note     Today's date: 2022  Patient name: Jeovanny Hendrickson  : 1940  MRN: [id number]  Referring provider: Kaela Yu*  Dx:   Encounter Diagnosis     ICD-10-CM    1  Posterior tibial tendinitis of right leg  M76 821       2  Tibialis tendinitis, unspecified laterality  M76 829           Start Time: 9567  Stop Time: 1700  Total time in clinic (min): 45 minutes    Subjective: Patient reports improved overall pain ratings  Objective: See treatment diary below      Assessment: Tolerated treatment well  Patient with reduced TTP noted along medial ankle/foot  Improving single limb balance on foam surface, however requires light UE support to complete without LOB  Progressed tandem amb on foam surface to further improve ankle stability on varying surfaces/conditions  Patient exhibited good technique with therapeutic exercises and would benefit from continued PT      Plan: Continue per plan of care  Progress treatment as tolerated         Diagnosis: Right ankle pain, pes planus  Precautions: DMII, HTN, cardiac cath, venous insufficiency, obesity  ( * asterisk indicates given per HEP)  Next Physician Appointment: unknown    Manuals    STM  DK DK DK DK DK DK DK   IASTM   DK DK DK DK DK DK   Joint Mobs  DK DK DK DK DK DK DK              Neuro Re-Ed           Towel curls*  Seated 2x10 Seated 2x10        Frog     x5min x5min      Tandem amb   FWD x2 laps FWD/BWD x3 laps FWD/BWD x3 laps FWD/BWD x3 laps FWD/BWD x3 laps FWD on foam x3   Rhomberg on foam           Foam cone taps      2cone x15 2cone 2x10 2cone 2x10   Wobble Board      A/P x1min, Lat x1min A/P x1min, Lat x1min A/P x1min, Lat x1min                         Ther Ex           Calf stretch*  10"x10 10"x10 10"x10 10"x10 10"x10 10"x10 10"x10   Heel raises*  2x10 2x10 2x10 2x10 2x10 2x10 2x10   Post tib heel raises*  With ball squeeze 2x10 With ball squeeze 2x10 With ball squeeze 2x10 With ball squeeze 2x10 With ball squeeze 2x10 With ball squeeze 2x10 With ball squeeze 2x10   Short foot  2x10 2x10 2x10       Ankle inv TB  GTB 2x10 GTB 2x10 GTB 2x10 GTB 2x10 Blue TB 2x10 Blue TB 2x10 Blue TB 2x10                         Ther Activity           bike  stationary (seat 7) x5min Recum x5min Recum x5min Recum x5min Recum x5min Recum x5min Recum x5min   STS practice                      Pt Ed HEP, POC          Re-Evaluation           Modalities           Heat/ice (PRN)

## 2022-12-22 ENCOUNTER — OFFICE VISIT (OUTPATIENT)
Dept: NEPHROLOGY | Facility: CLINIC | Age: 82
End: 2022-12-22

## 2022-12-22 VITALS
WEIGHT: 249 LBS | HEIGHT: 70 IN | SYSTOLIC BLOOD PRESSURE: 170 MMHG | DIASTOLIC BLOOD PRESSURE: 78 MMHG | BODY MASS INDEX: 35.65 KG/M2

## 2022-12-22 DIAGNOSIS — E11.69 DIABETES MELLITUS TYPE 2 IN OBESE (HCC): ICD-10-CM

## 2022-12-22 DIAGNOSIS — N18.9 CHRONIC KIDNEY DISEASE-MINERAL AND BONE DISORDER: ICD-10-CM

## 2022-12-22 DIAGNOSIS — I10 RESISTANT HYPERTENSION: ICD-10-CM

## 2022-12-22 DIAGNOSIS — M89.9 CHRONIC KIDNEY DISEASE-MINERAL AND BONE DISORDER: ICD-10-CM

## 2022-12-22 DIAGNOSIS — E83.9 CHRONIC KIDNEY DISEASE-MINERAL AND BONE DISORDER: ICD-10-CM

## 2022-12-22 DIAGNOSIS — N18.30 BENIGN HYPERTENSION WITH CHRONIC KIDNEY DISEASE, STAGE III (HCC): ICD-10-CM

## 2022-12-22 DIAGNOSIS — E66.9 DIABETES MELLITUS TYPE 2 IN OBESE (HCC): ICD-10-CM

## 2022-12-22 DIAGNOSIS — N18.32 STAGE 3B CHRONIC KIDNEY DISEASE (HCC): Primary | ICD-10-CM

## 2022-12-22 DIAGNOSIS — I12.9 BENIGN HYPERTENSION WITH CHRONIC KIDNEY DISEASE, STAGE III (HCC): ICD-10-CM

## 2022-12-22 RX ORDER — TORSEMIDE 10 MG/1
15 TABLET ORAL DAILY
Qty: 135 TABLET | Refills: 2 | Status: SHIPPED | OUTPATIENT
Start: 2022-12-22

## 2022-12-22 RX ORDER — LOSARTAN POTASSIUM 25 MG/1
25 TABLET ORAL DAILY
Qty: 90 TABLET | Refills: 1 | Status: SHIPPED | OUTPATIENT
Start: 2022-12-22

## 2022-12-22 NOTE — PROGRESS NOTES
NEPHROLOGY OFFICE PROGRESS NOTE   Dino Betts [de-identified] y o  male MRN: [id number]  DATE: 12/22/22  Reason for visit: Continued evaluation and management of CKD  ASSESSMENT & PLAN:  1  Chronic kidney disease, stage IIIB  · Lance's baseline creatinine is around 1 6 to 2 0 over the past 2 years  · Etiology is felt to be diabetic kidney disease  · Renal function is stable  Creatinine 1 94   · Completed CKD education  · Restart losartan 25 mg daily for BP and proteinuria control  · Continue Spironolactone  · Treatment/Management consists of controlling modifiable risk factors (good blood pressure, glycemic and lipid control) and avoidance of nephrotoxic medications in order to slow down progression of renal disease  · May consider starting an SGLT2 inhibitor in 2023  2  Proteinuria:  · UPC ratio is 1 27 -  above goal (<1 0)  · Restart Losartan 25 mg OD  · Continue Spironolactone 12 5 mg OD  3  Hypertension:  · Home BP readings are close to goal (<130/80)  · Current regimen: Torsemide 15 mg OD, Clonidine 0 3 mg patch, Carvedilol 12 5 mg BID, Spironolactone 12 5 mg OD  · Restart Losartan 25 mg daily and recheck BMP  · No evidence of hyperaldosteronism  4  Mineral and bone disease:  · PTH is at goal - 18 9 in July 2022  · Ca and phos are at goal    · Vitamin D level is below goal - 25 2 in July 2022  Inc Vit D to 2000 units daily  5  Diabetes mellitus:  · Glycemic control is at goal    · Diabetic management is deferred to endocrinology or PCP  · Consider SGLT2 inhibitors in the near future  6  Hyperlipidemia:  · On fenofibrate and pravastatin  Patient Instructions   You have chronic kidney disease (CKD) and your kidney function is stable  Please restart Losartan at 25 mg daily and check a BMP in 2 weeks  Follow up in 4 months - please obtain blood work 1-2 weeks prior to the appointment  Please check your BP twice daily for 1 week and bring a log with your next visit  Please avoid taking NSAIDs (Ibuprofen, Motrin, Aleve, Advil, Naproxen, Celebrex, etc )  Make sure you are eating healthy and have adequate exercise  SUBJECTIVE / INTERVAL HISTORY:  Lola Trevino was last seen in August 2022  No hospital stays or ER visits  Since last visit, we stopped his Losartan HCT due to his creatinine going up to 2 4  Repeat labs after stopping Losartan showed improvement in SCr to 2 0  He is now on Torsemide 15 mg daily  Home BP readings are in the 120s to 130s/60s to 70s  No acute complaints at this time  BP cuff calibration in Dec 22, 2022  Home BP cuff (wrist) 163/78  Office BP cuff 170/78     PMH/PSH: HTN, DM, HLP, CAD, LEONORA on CPAP, leg edema, BPH, DJD, SBO, macular degeneration, tonsillectomy, R inguinal hernia, retinal surgery       Previous work up:   November 1, 2019:  Aldosterone 6, plasma renin activity 0 22, UPC ratio 0 85, metanephrines 34, normetanephrines 83, TSH 1 53     10/25/19 Renal US: R 12 9 cm, L 14 3 cm, bilateral cortical cysts  No hydronephrosis  ALLERGIES: No Known Allergies    REVIEW OF SYSTEMS:  Review of Systems   Constitutional: Positive for fatigue  Negative for appetite change, chills and fever  Respiratory: Negative for cough and shortness of breath  Cardiovascular: Positive for leg swelling  Negative for chest pain  Gastrointestinal: Negative for abdominal pain, diarrhea, nausea and vomiting  Genitourinary: Negative for hematuria  Musculoskeletal: Positive for back pain  Negative for arthralgias  Neurological: Positive for light-headedness  Negative for dizziness  OBJECTIVE:  /78   Ht 5' 10" (1 778 m)   Wt 113 kg (249 lb)   BMI 35 73 kg/m²   Current Weight: Weight - Scale: 113 kg (249 lb) Body mass index is 35 73 kg/m²  Physical Exam  Constitutional:       General: He is not in acute distress  Appearance: Normal appearance  He is well-developed  He is obese  He is not ill-appearing or diaphoretic     HENT: Head: Normocephalic and atraumatic  Eyes:      General: No scleral icterus  Conjunctiva/sclera: Conjunctivae normal    Neck:      Vascular: No JVD  Cardiovascular:      Rate and Rhythm: Normal rate and regular rhythm  Heart sounds: Normal heart sounds  Pulmonary:      Effort: Pulmonary effort is normal  No respiratory distress  Breath sounds: Normal breath sounds  Abdominal:      General: Bowel sounds are normal       Palpations: Abdomen is soft  Musculoskeletal:      Cervical back: Neck supple  Comments: Mild LE edema of ankles and lower 3rd of legs  Skin:     General: Skin is warm and dry  Neurological:      Mental Status: He is alert and oriented to person, place, and time     Psychiatric:         Behavior: Behavior normal          Judgment: Judgment normal        Medications:  Current Outpatient Medications:   •  aspirin 325 mg tablet, Take 325 mg by mouth daily, Disp: , Rfl:   •  Biotin 5 MG TABS, Take by mouth daily, Disp: , Rfl:   •  carvedilol (COREG) 12 5 mg tablet, TAKE ONE TABLET BY MOUTH 2 TIMES A DAY, Disp: 180 tablet, Rfl: 0  •  cholecalciferol (VITAMIN D3) 1,000 units tablet, Take 1,000 Units by mouth 2 (two) times a day, Disp: , Rfl:   •  Choline Fenofibrate (Fenofibric Acid) 135 MG CPDR, TAKE ONE CAPSULE BY MOUTH EVERY DAY, Disp: 30 capsule, Rfl: 12  •  cloNIDine (CATAPRES-TTS-3) 0 3 mg/24 hr, PLACE ONE PATCH (0 3MG TOTAL) ON THE SKIN ONCE A WEEK, Disp: 4 patch, Rfl: 0  •  co-enzyme Q-10 30 MG capsule, Take 100 mg by mouth daily , Disp: , Rfl:   •  LUTEIN-ZEAXANTHIN-BILBERRY PO, Take by mouth 2 (two) times a day, Disp: , Rfl:   •  milk thistle 175 MG tablet, Take 175 mg by mouth daily, Disp: , Rfl:   •  Misc Natural Products (OSTEO BI-FLEX TRIPLE STRENGTH PO), Take 1 tablet by mouth 2 (two) times a day, Disp: , Rfl:   •  multivitamin (THERAGRAN) TABS, Take 1 tablet by mouth daily, Disp: , Rfl:   •  Omega-3 Fatty Acids (FISH OIL) 1,000 mg, Take 1,000 mg by mouth daily, Disp: , Rfl:   •  pravastatin (PRAVACHOL) 40 mg tablet, TAKE ONE TABLET BY MOUTH EVERY DAY, Disp: 90 tablet, Rfl: 0  •  spironolactone (ALDACTONE) 25 mg tablet, TAKE ONE TABLET BY MOUTH EVERY OTHER DAY (Patient taking differently: Take 12 5 mg by mouth daily), Disp: 45 tablet, Rfl: 2  •  Taurine 500 MG CAPS, Take by mouth daily, Disp: , Rfl:   •  thiamine (VITAMIN B1) 100 mg tablet, Take 100 mg by mouth daily, Disp: , Rfl:   •  torsemide (DEMADEX) 10 mg tablet, TAKE ONE TABLET BY MOUTH EVERY DAY (Patient taking differently: Take 15 mg by mouth daily), Disp: 90 tablet, Rfl: 1  •  Tradjenta 5 MG TABS, Take 5 mg by mouth in the morning , Disp: 30 tablet, Rfl: 0  •  TURMERIC PO, Take by mouth  , Disp: , Rfl:   •  vitamin E 100 UNIT capsule, Take 100 Units by mouth daily  , Disp: , Rfl:   •  GLUCOSAMINE-CHONDROITIN PO, Take 2 tablets by mouth daily  (Patient not taking: Reported on 12/22/2022), Disp: , Rfl:     Laboratory Results:  Results for orders placed or performed in visit on 12/12/22   Magnesium   Result Value Ref Range    Magnesium 2 2 1 6 - 2 6 mg/dL   Renal function panel   Result Value Ref Range    Albumin 3 6 3 5 - 5 0 g/dL    Calcium 9 3 8 3 - 10 1 mg/dL    Phosphorus 3 3 2 3 - 4 1 mg/dL    Glucose 146 (H) 65 - 140 mg/dL    BUN 36 (H) 5 - 25 mg/dL    Creatinine 1 94 (H) 0 60 - 1 30 mg/dL    Sodium 142 135 - 147 mmol/L    Potassium 3 8 3 5 - 5 3 mmol/L    Chloride 110 (H) 96 - 108 mmol/L    CO2 27 21 - 32 mmol/L    ANION GAP 5 4 - 13 mmol/L    eGFR 31 ml/min/1 73sq m   Protein / creatinine ratio, urine   Result Value Ref Range    Creatinine, Ur 87 2 mg/dL    Protein Urine Random 111 mg/dL    Prot/Creat Ratio, Ur 1 27 (H) 0 00 - 0 10

## 2022-12-22 NOTE — LETTER
December 22, 2022     April aGy MD  [address] [place] [address] 1  [facility]    Patient: Dino Betts   YOB: 1940   Date of Visit: 12/22/2022       Dear Dr Delmar Hernandez: Thank you for referring Martir Trujillo to me for evaluation  Below are my notes for this consultation  If you have questions, please do not hesitate to call me  I look forward to following your patient along with you  Sincerely,        Nelson Sierra MD        CC: No Recipients  Nelson Sierra MD  12/22/2022  2:37 PM  Sign when Signing Visit  NEPHROLOGY OFFICE PROGRESS NOTE   Dino Betts [de-identified] y o  male MRN: [id number]  DATE: 12/22/22  Reason for visit: Continued evaluation and management of CKD  ASSESSMENT & PLAN:  1  Chronic kidney disease, stage IIIB  · Lance's baseline creatinine is around 1 6 to 2 0 over the past 2 years  · Etiology is felt to be diabetic kidney disease  · Renal function is stable  Creatinine 1 94   · Completed CKD education  · Restart losartan 25 mg daily for BP and proteinuria control  · Continue Spironolactone  · Treatment/Management consists of controlling modifiable risk factors (good blood pressure, glycemic and lipid control) and avoidance of nephrotoxic medications in order to slow down progression of renal disease  · May consider starting an SGLT2 inhibitor in 2023  2  Proteinuria:  · UPC ratio is 1 27 -  above goal (<1 0)  · Restart Losartan 25 mg OD  · Continue Spironolactone 12 5 mg OD  3  Hypertension:  · Home BP readings are close to goal (<130/80)  · Current regimen: Torsemide 15 mg OD, Clonidine 0 3 mg patch, Carvedilol 12 5 mg BID, Spironolactone 12 5 mg OD  · Restart Losartan 25 mg daily and recheck BMP  · No evidence of hyperaldosteronism  4  Mineral and bone disease:  · PTH is at goal - 18 9 in July 2022  · Ca and phos are at goal    · Vitamin D level is below goal - 25 2 in July 2022  Inc Vit D to 2000 units daily  5  Diabetes mellitus:  · Glycemic control is at goal    · Diabetic management is deferred to endocrinology or PCP  · Consider SGLT2 inhibitors in the near future  6  Hyperlipidemia:  · On fenofibrate and pravastatin  Patient Instructions   You have chronic kidney disease (CKD) and your kidney function is stable  Please restart Losartan at 25 mg daily and check a BMP in 2 weeks  Follow up in 4 months - please obtain blood work 1-2 weeks prior to the appointment  Please check your BP twice daily for 1 week and bring a log with your next visit  Please avoid taking NSAIDs (Ibuprofen, Motrin, Aleve, Advil, Naproxen, Celebrex, etc )  Make sure you are eating healthy and have adequate exercise  SUBJECTIVE / INTERVAL HISTORY:  Lawrence Menchaca was last seen in August 2022  No hospital stays or ER visits  Since last visit, we stopped his Losartan HCT due to his creatinine going up to 2 4  Repeat labs after stopping Losartan showed improvement in SCr to 2 0  He is now on Torsemide 15 mg daily  Home BP readings are in the 120s to 130s/60s to 70s  No acute complaints at this time  BP cuff calibration in Dec 22, 2022  Home BP cuff (wrist) 163/78  Office BP cuff 170/78     PMH/PSH: HTN, DM, HLP, CAD, LEONORA on CPAP, leg edema, BPH, DJD, SBO, macular degeneration, tonsillectomy, R inguinal hernia, retinal surgery       Previous work up:   November 1, 2019:  Aldosterone 6, plasma renin activity 0 22, UPC ratio 0 85, metanephrines 34, normetanephrines 83, TSH 1 53     10/25/19 Renal US: R 12 9 cm, L 14 3 cm, bilateral cortical cysts  No hydronephrosis  ALLERGIES: No Known Allergies    REVIEW OF SYSTEMS:  Review of Systems   Constitutional: Positive for fatigue  Negative for appetite change, chills and fever  Respiratory: Negative for cough and shortness of breath  Cardiovascular: Positive for leg swelling  Negative for chest pain     Gastrointestinal: Negative for abdominal pain, diarrhea, nausea and vomiting  Genitourinary: Negative for hematuria  Musculoskeletal: Positive for back pain  Negative for arthralgias  Neurological: Positive for light-headedness  Negative for dizziness  OBJECTIVE:  /78   Ht 5' 10" (1 778 m)   Wt 113 kg (249 lb)   BMI 35 73 kg/m²   Current Weight: Weight - Scale: 113 kg (249 lb) Body mass index is 35 73 kg/m²  Physical Exam  Constitutional:       General: He is not in acute distress  Appearance: Normal appearance  He is well-developed  He is obese  He is not ill-appearing or diaphoretic  HENT:      Head: Normocephalic and atraumatic  Eyes:      General: No scleral icterus  Conjunctiva/sclera: Conjunctivae normal    Neck:      Vascular: No JVD  Cardiovascular:      Rate and Rhythm: Normal rate and regular rhythm  Heart sounds: Normal heart sounds  Pulmonary:      Effort: Pulmonary effort is normal  No respiratory distress  Breath sounds: Normal breath sounds  Abdominal:      General: Bowel sounds are normal       Palpations: Abdomen is soft  Musculoskeletal:      Cervical back: Neck supple  Comments: Mild LE edema of ankles and lower 3rd of legs  Skin:     General: Skin is warm and dry  Neurological:      Mental Status: He is alert and oriented to person, place, and time     Psychiatric:         Behavior: Behavior normal          Judgment: Judgment normal        Medications:  Current Outpatient Medications:   •  aspirin 325 mg tablet, Take 325 mg by mouth daily, Disp: , Rfl:   •  Biotin 5 MG TABS, Take by mouth daily, Disp: , Rfl:   •  carvedilol (COREG) 12 5 mg tablet, TAKE ONE TABLET BY MOUTH 2 TIMES A DAY, Disp: 180 tablet, Rfl: 0  •  cholecalciferol (VITAMIN D3) 1,000 units tablet, Take 1,000 Units by mouth 2 (two) times a day, Disp: , Rfl:   •  Choline Fenofibrate (Fenofibric Acid) 135 MG CPDR, TAKE ONE CAPSULE BY MOUTH EVERY DAY, Disp: 30 capsule, Rfl: 12  •  cloNIDine (CATAPRES-TTS-3) 0 3 mg/24 hr, PLACE ONE PATCH (0 3MG TOTAL) ON THE SKIN ONCE A WEEK, Disp: 4 patch, Rfl: 0  •  co-enzyme Q-10 30 MG capsule, Take 100 mg by mouth daily , Disp: , Rfl:   •  LUTEIN-ZEAXANTHIN-BILBERRY PO, Take by mouth 2 (two) times a day, Disp: , Rfl:   •  milk thistle 175 MG tablet, Take 175 mg by mouth daily, Disp: , Rfl:   •  Misc Natural Products (OSTEO BI-FLEX TRIPLE STRENGTH PO), Take 1 tablet by mouth 2 (two) times a day, Disp: , Rfl:   •  multivitamin (THERAGRAN) TABS, Take 1 tablet by mouth daily, Disp: , Rfl:   •  Omega-3 Fatty Acids (FISH OIL) 1,000 mg, Take 1,000 mg by mouth daily, Disp: , Rfl:   •  pravastatin (PRAVACHOL) 40 mg tablet, TAKE ONE TABLET BY MOUTH EVERY DAY, Disp: 90 tablet, Rfl: 0  •  spironolactone (ALDACTONE) 25 mg tablet, TAKE ONE TABLET BY MOUTH EVERY OTHER DAY (Patient taking differently: Take 12 5 mg by mouth daily), Disp: 45 tablet, Rfl: 2  •  Taurine 500 MG CAPS, Take by mouth daily, Disp: , Rfl:   •  thiamine (VITAMIN B1) 100 mg tablet, Take 100 mg by mouth daily, Disp: , Rfl:   •  torsemide (DEMADEX) 10 mg tablet, TAKE ONE TABLET BY MOUTH EVERY DAY (Patient taking differently: Take 15 mg by mouth daily), Disp: 90 tablet, Rfl: 1  •  Tradjenta 5 MG TABS, Take 5 mg by mouth in the morning , Disp: 30 tablet, Rfl: 0  •  TURMERIC PO, Take by mouth  , Disp: , Rfl:   •  vitamin E 100 UNIT capsule, Take 100 Units by mouth daily  , Disp: , Rfl:   •  GLUCOSAMINE-CHONDROITIN PO, Take 2 tablets by mouth daily  (Patient not taking: Reported on 12/22/2022), Disp: , Rfl:     Laboratory Results:  Results for orders placed or performed in visit on 12/12/22   Magnesium   Result Value Ref Range    Magnesium 2 2 1 6 - 2 6 mg/dL   Renal function panel   Result Value Ref Range    Albumin 3 6 3 5 - 5 0 g/dL    Calcium 9 3 8 3 - 10 1 mg/dL    Phosphorus 3 3 2 3 - 4 1 mg/dL    Glucose 146 (H) 65 - 140 mg/dL    BUN 36 (H) 5 - 25 mg/dL    Creatinine 1 94 (H) 0 60 - 1 30 mg/dL    Sodium 142 135 - 147 mmol/L    Potassium 3 8 3 5 - 5 3 mmol/L    Chloride 110 (H) 96 - 108 mmol/L    CO2 27 21 - 32 mmol/L    ANION GAP 5 4 - 13 mmol/L    eGFR 31 ml/min/1 73sq m   Protein / creatinine ratio, urine   Result Value Ref Range    Creatinine, Ur 87 2 mg/dL    Protein Urine Random 111 mg/dL    Prot/Creat Ratio, Ur 1 27 (H) 0 00 - 0 10

## 2022-12-22 NOTE — PATIENT INSTRUCTIONS
You have chronic kidney disease (CKD) and your kidney function is stable  Please restart Losartan at 25 mg daily and check a BMP in 2 weeks  Follow up in 4 months - please obtain blood work 1-2 weeks prior to the appointment  Please check your BP twice daily for 1 week and bring a log with your next visit  Please avoid taking NSAIDs (Ibuprofen, Motrin, Aleve, Advil, Naproxen, Celebrex, etc )  Make sure you are eating healthy and have adequate exercise

## 2022-12-28 ENCOUNTER — EVALUATION (OUTPATIENT)
Dept: PHYSICAL THERAPY | Facility: CLINIC | Age: 82
End: 2022-12-28

## 2022-12-28 DIAGNOSIS — M76.829 TIBIALIS TENDINITIS, UNSPECIFIED LATERALITY: ICD-10-CM

## 2022-12-28 DIAGNOSIS — M76.821 POSTERIOR TIBIAL TENDINITIS OF RIGHT LEG: Primary | ICD-10-CM

## 2022-12-28 NOTE — PROGRESS NOTES
PT Re-Evaluation     Today's date: 2022  Patient name: Raheel Mooney  : 1940  MRN: [id number]  Referring provider: Cristopher Edmond*  Dx:   Encounter Diagnosis     ICD-10-CM    1  Posterior tibial tendinitis of right leg  M76 821       2  Tibialis tendinitis, unspecified laterality  M76 829           Start Time: 1600  Stop Time: 1640  Total time in clinic (min): 40 minutes    Assessment  Assessment details: Patient is a [de-identified] y o  male who presents to outpatient PT with reports of Right ankle pain that has been chronic in nature  Patient has attended 8 sessions since IE  Patient presents with light tenderness to palpation of medial Right foot/medial arch  Patient noted to have improved Right ankle mobility, especially into inversion, with reduced ERP  Patient also noted to have improving Right ankle/lower length strength noted with inversion/eversion especially  Patient continues to report difficulty with prolonged walking and ambulating on uneven surfaces  This affects his ability to participate in community ambulation or performing outside and leisure activities  Patient also reports difficulty and some pain with walking on grass/gravel at home, which is required to his front and backyard  Patient noted to have improving stair negotiation ability, but continues to require UE support to avoid exacerbation of pain  Patient uses single point cane as needed, but primarily walks around house without AD  Patient continues to have foot/ankle pronation upon Right weight-bearing, with collapsed medial arch noted from short-sitting to standing  Patient's gait mechanics, however, have improved overall since start of PT  His current impairments appear to be more mechanical in nature, with reproduction of pain primarily to palpation of posterior tib tendon due to possible tendonitis, otherwise with weight-bearing tasks as mentioned above   Physical therapy interventions will focus on improving calf and lower leg muscle flexibility, intrinsic and lower leg muscle strength, balance on uneven surface, and overall better motor control with functional tasks as mentioned above  Thus, he will benefit from continued skilled PT services to address his impaimrnets for better stability, mechanics, and ease with ADLs, work-related tasks, and other functional activities  Thank you for the referral     Impairments: abnormal gait, abnormal or restricted ROM, activity intolerance, impaired balance, impaired physical strength, lacks appropriate home exercise program, pain with function, weight-bearing intolerance, poor posture  and poor body mechanics  Functional limitations: sit-stands, standing, stair negotiation, walking after prolonged sittingBarriers to therapy: Chronicity of symptoms, medical history  Understanding of Dx/Px/POC: good   Prognosis: good    Goals  Impairment Goals 4-6 weeks   In order to maximize function patient will be able to      - Decrease intensity/duration/frequency of pain to 4/10  Ongoing  - Demonstrate symmetrical ankle AROM without pain to improve mobility and mechanics with walking and sit-stand transfers  Partially Met  - Increase ankle strength to 4/5 throughout to improve stability with prolonged walking, sit-stand transfers, and on uneven surfaces  Partially Met  - Demonstrate a tandem stance bilaterally to 30 sec to improve stability on varying surfaces and conditions  Improved    Functional Goals 6-8 weeks  In order to return to prior level of function patient will be able to      - Participate in ADL's/IADL's/sport specific activities with no greater than 2/10 pain  Ongoing  - Increase Functional Status Measure (FOTO) to: anticipated at discharge  Ongoing  - Demonstrate independence and compliant with HEP  Met  - Demonstrate a squat and or sit to stand with good mechanics and eccentric control without pain/difficulty/compensation   Met  - Ascend and descend stairs without increased pain/compensation/difficulty and a reciprocal gait pattern  Improved  - Patient will be able to demonstrate good gait mechanics without compensations  Improved  - Demonstrate ankle stability on even and uneven surfaces as seen by minimal to no compensations or LOB in order to progress improved safety and reduce falls risk on varying terrain/environments  Ongoing    Plan  Patient would benefit from: skilled PT  Planned modality interventions: cryotherapy  Other planned modality interventions: moist heat  Planned therapy interventions: joint mobilization, manual therapy, neuromuscular re-education, patient education, strengthening, stretching, therapeutic activities, therapeutic exercise, home exercise program, functional ROM exercises, Coughlin taping, postural training, balance/weight bearing training, body mechanics training, flexibility, massage, gait training and transfer training  Frequency: 1-2x/week  Duration in weeks: 4  Treatment plan discussed with: patient        Subjective Evaluation    History of Present Illness  Mechanism of injury: Debbi Ford is a [de-identified]y o  year-old male who presents with Right ankle pain that he reports is chronic in nature  Patient reports he "lived with the pain" for many years that gradually worsened  Patient reports he has noticed difficulty with walking on uneven surfaces  He reports he was able to walk long distances in the mall, however recently has noted pain and difficulty  Patient saw podiatrist at [facility] on 11/23 and received x-ray that revealed "collapsing medial arch and flat foot"  Patient reports doctor also told him the tibia was putting pressure and causing rotation at ankle joint  Patient was referred for physical therapy at this time  He has a follow up scheduled in January 2023  Patient reports no history of accident or fall  He reports independent with ADLs and household chores, with assistance from wife as needed    Quality of life: good    Pain  Current pain ratin  At best pain ratin  At worst pain ratin  Location: Right ankle  Quality: sharp  Aggravating factors: standing, stair climbing and walking  Progression: worsening    Social Support  Steps to enter house: yes (without railing)  3  Stairs in house: yes (with railing)   12  Lives in: one-story house  Lives with: spouse    Employment status: working (owns business)    Diagnostic Tests  X-ray: abnormal (2022: Right ankle)  Treatments  Current treatment: physical therapy  Patient Goals  Patient goals for therapy: decreased pain, increased motion, improved balance, increased strength and independence with ADLs/IADLs          Objective     Observations     Additional Observation Details  Standing Posture: Patient noted to have pes planus bilaterally, however more severe on Right foot with significant pronation and hindfoot valgus noted  Sitting Posture: Patient noted to have supination of Right foot more than Left, with collapsed medial arch noted upon weight-bearing/standing  Gait Assessment: with SPC occasionally otherwise without AD, decreased toe-off in terminal stance and slight limp noted on Right LE  Mild swelling noted due to venous insufficiency, but no pitting edema (patient reports elevating feet at night and using compression stockings)    Palpation     Right   Tenderness of the medial gastrocnemius and posterior tibialis  Tenderness     Right Ankle/Foot   Tenderness in the Achilles insertion, deltoid ligament, medial malleolus, mid-plantar aspect, posterior tibial tendon and talar dome  Active Range of Motion   Left Ankle/Foot   Normal active range of motion    Right Ankle/Foot   Dorsiflexion (ke): 10 degrees   Plantar flexion: 40 degrees   Inversion: 20 degrees with pain  Eversion: 20 degrees     Joint Play     Right Ankle/Foot  Joints within functional limits are the talocrural joint and subtalar joint       Strength/Myotome Testing     Left Ankle/Foot   Normal strength    Right Ankle/Foot   Dorsiflexion: 4  Plantar flexion: 4-  Inversion: 4-  Eversion: 4  Great toe flexion: 4-  Great toe extension: 3+    Tests     Right Ankle/Foot   Positive for navicular drop and windlass  Negative for anterior drawer, eversion talar tilt, inversion talar tilt and posterior drawer  Additional Tests Details  Rhomberg on foam: EO 30 sec, EC 30 sec  Tandem on floor (best of 2 trials): R/L 30 sec, L/R 22 sec  Single Leg Balance: <2 sec bilaterally       Subjective: Patient reports his pain is improving, but still has difficulty with prolonged walking and walking on uneven distances          Diagnosis: Right ankle pain, pes planus  Precautions: DMII, HTN, cardiac cath, venous insufficiency, obesity  ( * asterisk indicates given per HEP)  Next Physician Appointment: 1/6/2023    Manuals 12/12 12/14 12/19 12/21 12/28   STM DK DK DK DK DK   IASTM DK DK DK DK    Joint Mobs DK DK DK DK            Neuro Re-Ed        Towel curls*        Frog  x5min       Tandem amb FWD/BWD x3 laps FWD/BWD x3 laps FWD/BWD x3 laps FWD on foam x3 FWD/BWD on foam x3 laps   Rhomberg on foam     test   Foam cone taps  2cone x15 2cone 2x10 2cone 2x10    Wobble Board  A/P x1min, Lat x1min A/P x1min, Lat x1min A/P x1min, Lat x1min                    Ther Ex        Calf stretch* 10"x10 10"x10 10"x10 10"x10    Heel raises* 2x10 2x10 2x10 2x10    Post tib heel raises* With ball squeeze 2x10 With ball squeeze 2x10 With ball squeeze 2x10 With ball squeeze 2x10    Short foot        Ankle inv TB GTB 2x10 Blue TB 2x10 Blue TB 2x10 Blue TB 2x10                    Ther Activity        bike Recum x5min Recum x5min Recum x5min Recum x5min Recum x5min   STS practice                Pt Ed        Re-Evaluation     DK   Modalities        Heat/ice (PRN)

## 2022-12-28 NOTE — LETTER
2022    [place], [place]  [address] [facility] ,  [address] [place] [address]    Patient: Stacie Bruce   YOB: 1940   Date of Visit: 2022     Encounter Diagnosis     ICD-10-CM    1  Posterior tibial tendinitis of right leg  M76 821       2  Tibialis tendinitis, unspecified laterality  [id number]           Dear Dr Sánchez Lakhani: Thank you for your recent referral of Stacie Bruce  Please review the attached evaluation summary from Jeremie's recent visit  Please verify that you agree with the plan of care by signing the attached order  If you have any questions or concerns, please do not hesitate to call  I sincerely appreciate the opportunity to share in the care of one of your patients and hope to have another opportunity to work with you in the near future  Sincerely,    Dany Jimenez, PT      Referring Provider:      I certify that I have read the below Plan of Care and certify the need for these services furnished under this plan of treatment while under my care  José Lipscomb [place] [address] [address] ,  [address] [place] [address]  Via Fax: [phone]          PT Re-Evaluation     Today's date: 2022  Patient name: Stacie Bruce  : 1940  MRN: [id number]  Referring provider: Laz Cintron*  Dx:   Encounter Diagnosis     ICD-10-CM    1  Posterior tibial tendinitis of right leg  M76 821       2  Tibialis tendinitis, unspecified laterality  M76 829           Start Time: 1600  Stop Time: 1640  Total time in clinic (min): 40 minutes    Assessment  Assessment details: Patient is a [de-identified] y o  male who presents to outpatient PT with reports of Right ankle pain that has been chronic in nature  Patient has attended 8 sessions since IE  Patient presents with light tenderness to palpation of medial Right foot/medial arch   Patient noted to have improved Right ankle mobility, especially into inversion, with reduced ERP  Patient also noted to have improving Right ankle/lower length strength noted with inversion/eversion especially  Patient continues to report difficulty with prolonged walking and ambulating on uneven surfaces  This affects his ability to participate in community ambulation or performing outside and leisure activities  Patient also reports difficulty and some pain with walking on grass/gravel at home, which is required to his front and backyard  Patient noted to have improving stair negotiation ability, but continues to require UE support to avoid exacerbation of pain  Patient uses single point cane as needed, but primarily walks around house without AD  Patient continues to have foot/ankle pronation upon Right weight-bearing, with collapsed medial arch noted from short-sitting to standing  Patient's gait mechanics, however, have improved overall since start of PT  His current impairments appear to be more mechanical in nature, with reproduction of pain primarily to palpation of posterior tib tendon due to possible tendonitis, otherwise with weight-bearing tasks as mentioned above  Physical therapy interventions will focus on improving calf and lower leg muscle flexibility, intrinsic and lower leg muscle strength, balance on uneven surface, and overall better motor control with functional tasks as mentioned above  Thus, he will benefit from continued skilled PT services to address his impaimrnets for better stability, mechanics, and ease with ADLs, work-related tasks, and other functional activities    Thank you for the referral     Impairments: abnormal gait, abnormal or restricted ROM, activity intolerance, impaired balance, impaired physical strength, lacks appropriate home exercise program, pain with function, weight-bearing intolerance, poor posture  and poor body mechanics  Functional limitations: sit-stands, standing, stair negotiation, walking after prolonged sittingBarriers to therapy: Chronicity of symptoms, medical history  Understanding of Dx/Px/POC: good   Prognosis: good    Goals  Impairment Goals 4-6 weeks   In order to maximize function patient will be able to      - Decrease intensity/duration/frequency of pain to 4/10  Ongoing  - Demonstrate symmetrical ankle AROM without pain to improve mobility and mechanics with walking and sit-stand transfers  Partially Met  - Increase ankle strength to 4/5 throughout to improve stability with prolonged walking, sit-stand transfers, and on uneven surfaces  Partially Met  - Demonstrate a tandem stance bilaterally to 30 sec to improve stability on varying surfaces and conditions  Improved    Functional Goals 6-8 weeks  In order to return to prior level of function patient will be able to      - Participate in ADL's/IADL's/sport specific activities with no greater than 2/10 pain  Ongoing  - Increase Functional Status Measure (FOTO) to: anticipated at discharge  Ongoing  - Demonstrate independence and compliant with HEP  Met  - Demonstrate a squat and or sit to stand with good mechanics and eccentric control without pain/difficulty/compensation  Met  - Ascend and descend stairs without increased pain/compensation/difficulty and a reciprocal gait pattern  Improved  - Patient will be able to demonstrate good gait mechanics without compensations  Improved  - Demonstrate ankle stability on even and uneven surfaces as seen by minimal to no compensations or LOB in order to progress improved safety and reduce falls risk on varying terrain/environments   Ongoing    Plan  Patient would benefit from: skilled PT  Planned modality interventions: cryotherapy  Other planned modality interventions: moist heat  Planned therapy interventions: joint mobilization, manual therapy, neuromuscular re-education, patient education, strengthening, stretching, therapeutic activities, therapeutic exercise, home exercise program, functional ROM exercises, Coughlin taping, postural training, balance/weight bearing training, body mechanics training, flexibility, massage, gait training and transfer training  Frequency: 1-2x/week  Duration in weeks: 4  Treatment plan discussed with: patient        Subjective Evaluation    History of Present Illness  Mechanism of injury: Abida Duffy is a [de-identified]y o  year-old male who presents with Right ankle pain that he reports is chronic in nature  Patient reports he "lived with the pain" for many years that gradually worsened  Patient reports he has noticed difficulty with walking on uneven surfaces  He reports he was able to [place] the mall, however recently has noted pain and difficulty  Patient saw podiatrist at [facility] [facility] on  and received x-ray that revealed "collapsing medial arch and flat foot"  Patient reports doctor also told him the tibia was putting pressure and causing rotation at ankle joint  Patient was referred for physical therapy at this time  He has a follow up scheduled in 2023  Patient reports no history of accident or fall  He reports independent with ADLs and household chores, with assistance from wife as needed    Quality of life: good    Pain  Current pain ratin  At best pain ratin  At worst pain ratin  Location: Right ankle  Quality: sharp  Aggravating factors: standing, stair climbing and walking  Progression: worsening    Social Support  Steps to enter house: yes (without railing)  3  Stairs in house: yes (with railing)   12  Lives in: one-story house  Lives with: spouse    Employment status: working (owns business)    Diagnostic Tests  X-ray: abnormal (2022: Right ankle)  Treatments  Current treatment: physical therapy  Patient Goals  Patient goals for therapy: decreased pain, increased motion, improved balance, increased strength and independence with ADLs/IADLs          Objective     Observations     Additional Observation Details  Standing Posture: Patient noted to have pes planus bilaterally, however more severe on Right foot with significant pronation and hindfoot valgus noted  Sitting Posture: Patient noted to have supination of Right foot more than Left, with collapsed medial arch noted upon weight-bearing/standing  Gait Assessment: with SPC occasionally otherwise without AD, decreased toe-off in terminal stance and slight limp noted on Right LE  Mild swelling noted due to venous insufficiency, but no pitting edema (patient reports elevating feet at night and using compression stockings)    Palpation     Right   Tenderness of the medial gastrocnemius and posterior tibialis  Tenderness     Right Ankle/Foot   Tenderness in the Achilles insertion, deltoid ligament, medial malleolus, mid-plantar aspect, posterior tibial tendon and talar dome  Active Range of Motion   Left Ankle/Foot   Normal active range of motion    Right Ankle/Foot   Dorsiflexion (ke): 10 degrees   Plantar flexion: 40 degrees   Inversion: 20 degrees with pain  Eversion: 20 degrees     Joint Play     Right Ankle/Foot  Joints within functional limits are the talocrural joint and subtalar joint  Strength/Myotome Testing     Left Ankle/Foot   Normal strength    Right Ankle/Foot   Dorsiflexion: 4  Plantar flexion: 4-  Inversion: 4-  Eversion: 4  Great toe flexion: 4-  Great toe extension: 3+    Tests     Right Ankle/Foot   Positive for navicular drop and windlass  Negative for anterior drawer, eversion talar tilt, inversion talar tilt and posterior drawer  Additional Tests Details  Rhomberg on foam: EO 30 sec, EC 30 sec  Tandem on floor (best of 2 trials): R/L 30 sec, L/R 22 sec  Single Leg Balance: <2 sec bilaterally       Subjective: Patient reports his pain is improving, but still has difficulty with prolonged walking and walking on uneven distances         Diagnosis: Right ankle pain, pes planus  Precautions: DMII, HTN, cardiac cath, venous insufficiency, obesity  ( * asterisk indicates given per HEP)  Next Physician Appointment: 1/6/2023    Manuals 12/12 12/14 12/19 12/21 12/28   STM DK DK DK DK DK   IASTM DK DK DK DK    Joint Mobs DK DK DK DK            Neuro Re-Ed        Towel curls*        Frog  x5min       Tandem amb FWD/BWD x3 laps FWD/BWD x3 laps FWD/BWD x3 laps FWD on foam x3 FWD/BWD on foam x3 laps   Rhomberg on foam     test   Foam cone taps  2cone x15 2cone 2x10 2cone 2x10    Wobble Board  A/P x1min, Lat x1min A/P x1min, Lat x1min A/P x1min, Lat x1min                    Ther Ex        Calf stretch* 10"x10 10"x10 10"x10 10"x10    Heel raises* 2x10 2x10 2x10 2x10    Post tib heel raises* With ball squeeze 2x10 With ball squeeze 2x10 With ball squeeze 2x10 With ball squeeze 2x10    Short foot        Ankle inv TB GTB 2x10 Blue TB 2x10 Blue TB 2x10 Blue TB 2x10                    Ther Activity        bike Recum x5min Recum x5min Recum x5min Recum x5min Recum x5min   STS practice                Pt Ed        Re-Evaluation     DK   Modalities        Heat/ice (PRN)

## 2023-01-04 ENCOUNTER — OFFICE VISIT (OUTPATIENT)
Dept: PHYSICAL THERAPY | Facility: CLINIC | Age: 83
End: 2023-01-04

## 2023-01-04 DIAGNOSIS — M76.821 POSTERIOR TIBIAL TENDINITIS OF RIGHT LEG: Primary | ICD-10-CM

## 2023-01-04 NOTE — PROGRESS NOTES
Daily Note     Today's date: 2023  Patient name: Moriah Torres  : 1940  MRN: [id number]  Referring provider: Amara Lares*  Dx:   Encounter Diagnosis     ICD-10-CM    1  Posterior tibial tendinitis of right leg  M76 821           Start Time: 4795  Stop Time: 1615  Total time in clinic (min): 45 minutes    Subjective: Patient arrived about 10 min late and was able to be accommodated for full duration of session today  Patient reports he went to see podiatrist who provided him with shoe inserts  Patient reports he has noticed a big difference with walking, reduced pain  Objective: See treatment diary below      Assessment: Tolerated treatment well  Reduced TTP noted medial Right foot/tibialis tendon region  Patient noted to have steadily improving stability on foam surface, but continues to require light UE support to maintain balance and avoid LOB/unsteadiness  Progressed cone taps to 3 cones today  Added leg press end of session with feet at lower platform  Performed with positive response and no increase in pain reported  Patient exhibited good technique with therapeutic exercises and would benefit from continued PT      Plan: Continue per plan of care  Progress treatment as tolerated         Diagnosis: Right ankle pain, pes planus  Precautions: DMII, HTN, cardiac cath, venous insufficiency, obesity  ( * asterisk indicates given per HEP)  Next Physician Appointment: 2023    Manuals 23   STM DK DK DK DK DK DK   IASTM DK DK DK DK  DK   Joint Mobs DK DK DK DK  DK            Neuro Re-Ed         Towel curls*         Frog  x5min        Tandem amb FWD/BWD x3 laps FWD/BWD x3 laps FWD/BWD x3 laps FWD on foam x3 FWD/BWD on foam x3 laps FWD/BWD on foam x3 laps   Rhomberg on foam     test    Foam cone taps  2cone x15 2cone 2x10 2cone 2x10  3cones 2x10   Wobble Board  A/P x1min, Lat x1min A/P x1min, Lat x1min A/P x1min, Lat x1min  A/P x1min, Lat x1min Ther Ex         Calf stretch* 10"x10 10"x10 10"x10 10"x10  10"x10   Heel raises* 2x10 2x10 2x10 2x10  2x10   Post tib heel raises* With ball squeeze 2x10 With ball squeeze 2x10 With ball squeeze 2x10 With ball squeeze 2x10  With ball squeeze 2x10   Short foot         Ankle inv TB GTB 2x10 Blue TB 2x10 Blue TB 2x10 Blue TB 2x10  Blue TB 2x10                     Ther Activity         bike Recum x5min Recum x5min Recum x5min Recum x5min Recum x5min Recum x5min   Leg Press      lower platform 50# 2x10   STS practice                  Pt Ed         Re-Evaluation     DK    Modalities         Heat/ice (PRN)

## 2023-01-09 ENCOUNTER — OFFICE VISIT (OUTPATIENT)
Dept: PHYSICAL THERAPY | Facility: CLINIC | Age: 83
End: 2023-01-09

## 2023-01-09 DIAGNOSIS — M76.829 TIBIALIS TENDINITIS, UNSPECIFIED LATERALITY: ICD-10-CM

## 2023-01-09 DIAGNOSIS — I10 ESSENTIAL HYPERTENSION: ICD-10-CM

## 2023-01-09 DIAGNOSIS — E78.2 MIXED HYPERLIPIDEMIA: ICD-10-CM

## 2023-01-09 DIAGNOSIS — M76.821 POSTERIOR TIBIAL TENDINITIS OF RIGHT LEG: Primary | ICD-10-CM

## 2023-01-09 RX ORDER — CARVEDILOL 12.5 MG/1
TABLET ORAL
Qty: 180 TABLET | Refills: 0 | Status: SHIPPED | OUTPATIENT
Start: 2023-01-09

## 2023-01-09 RX ORDER — CLONIDINE 0.3 MG/24H
PATCH, EXTENDED RELEASE TRANSDERMAL
Qty: 4 PATCH | Refills: 0 | Status: SHIPPED | OUTPATIENT
Start: 2023-01-09

## 2023-01-09 NOTE — PROGRESS NOTES
Daily Note     Today's date: 2023  Patient name: Erich James  : 1940  MRN: [id number]  Referring provider: Corinna Halsted*  Dx:   Encounter Diagnosis     ICD-10-CM    1  Posterior tibial tendinitis of right leg  M76 821       2  Tibialis tendinitis, unspecified laterality  M76 829           Start Time:   Stop Time: 1900  Total time in clinic (min): 45 minutes    Subjective: Patient reports he has minimal to no pain lately  Objective: See treatment diary below      Assessment: Tolerated treatment well  Patient required some cuing for proper ankle inversion isolation  Performed tandem walking on foam with improved stability  Continues to have some difficulty medial-lateral ankle mobility/stability on wobble board  Good response to leg press exercise without increased pain  Patient exhibited good technique with therapeutic exercises and would benefit from continued PT      Plan: Continue per plan of care  Progress treatment as tolerated         Diagnosis: Right ankle pain, pes planus  Precautions: DMII, HTN, cardiac cath, venous insufficiency, obesity  ( * asterisk indicates given per HEP)  Next Physician Appointment: 2023    Manuals 23   STM DK DK DK DK DK DK DK   IASTM DK DK DK DK  DK DK   Joint Mobs DK DK DK DK  DK DK             Neuro Re-Ed          Towel curls*          Frog  x5min         Tandem amb FWD/BWD x3 laps FWD/BWD x3 laps FWD/BWD x3 laps FWD on foam x3 FWD/BWD on foam x3 laps FWD/BWD on foam x3 laps FWD/BWD on foam x3 laps   Rhomberg on foam     test     Foam cone taps  2cone x15 2cone 2x10 2cone 2x10  3cones 2x10 3cones 2x10   Wobble Board  A/P x1min, Lat x1min A/P x1min, Lat x1min A/P x1min, Lat x1min  A/P x1min, Lat x1min A/P x1min, Lat x1min                       Ther Ex          Calf stretch* 10"x10 10"x10 10"x10 10"x10  10"x10 10"x10   Heel raises* 2x10 2x10 2x10 2x10  2x10    Post tib heel raises* With ball squeeze 2x10 With ball squeeze 2x10 With ball squeeze 2x10 With ball squeeze 2x10  With ball squeeze 2x10    Short foot          Ankle inv TB GTB 2x10 Blue TB 2x10 Blue TB 2x10 Blue TB 2x10  Blue TB 2x10 Blue TB 2x10                       Ther Activity          bike Recum x5min Recum x5min Recum x5min Recum x5min Recum x5min Recum x5min Recum x5min   Leg Press      lower platform 50# 2x10 lower platform 70# 2x10, 90# 2x10, 110# x10   STS practice                    Pt Ed          Re-Evaluation     DK     Modalities          Heat/ice (PRN)

## 2023-01-11 ENCOUNTER — OFFICE VISIT (OUTPATIENT)
Dept: PHYSICAL THERAPY | Facility: CLINIC | Age: 83
End: 2023-01-11

## 2023-01-11 ENCOUNTER — APPOINTMENT (OUTPATIENT)
Dept: LAB | Facility: CLINIC | Age: 83
End: 2023-01-11

## 2023-01-11 DIAGNOSIS — M76.821 POSTERIOR TIBIAL TENDINITIS OF RIGHT LEG: Primary | ICD-10-CM

## 2023-01-11 DIAGNOSIS — I12.9 BENIGN HYPERTENSION WITH CHRONIC KIDNEY DISEASE, STAGE III (HCC): ICD-10-CM

## 2023-01-11 DIAGNOSIS — M76.829 TIBIALIS TENDINITIS, UNSPECIFIED LATERALITY: ICD-10-CM

## 2023-01-11 DIAGNOSIS — N18.30 BENIGN HYPERTENSION WITH CHRONIC KIDNEY DISEASE, STAGE III (HCC): ICD-10-CM

## 2023-01-11 LAB
ANION GAP SERPL CALCULATED.3IONS-SCNC: 5 MMOL/L (ref 4–13)
BUN SERPL-MCNC: 46 MG/DL (ref 5–25)
CALCIUM SERPL-MCNC: 9.1 MG/DL (ref 8.3–10.1)
CHLORIDE SERPL-SCNC: 109 MMOL/L (ref 96–108)
CO2 SERPL-SCNC: 30 MMOL/L (ref 21–32)
CREAT SERPL-MCNC: 2.36 MG/DL (ref 0.6–1.3)
GFR SERPL CREATININE-BSD FRML MDRD: 25 ML/MIN/1.73SQ M
GLUCOSE SERPL-MCNC: 139 MG/DL (ref 65–140)
POTASSIUM SERPL-SCNC: 4 MMOL/L (ref 3.5–5.3)
SODIUM SERPL-SCNC: 144 MMOL/L (ref 135–147)

## 2023-01-11 NOTE — PROGRESS NOTES
Daily Note     Today's date: 2023  Patient name: Alecia Aranda  : 1940  MRN: [id number]  Referring provider: Shonna Dykes*  Dx:   Encounter Diagnosis     ICD-10-CM    1  Posterior tibial tendinitis of right leg  M76 821       2  Tibialis tendinitis, unspecified laterality  M76 829           Start Time: 1600  Stop Time: 1645  Total time in clinic (min): 45 minutes    Subjective: Patient reported feeling good today  Stated that he had a busy day of running errands but reported no increased pain or discomfort  Objective: See treatment diary below      Assessment: IASTM performed along medial arch and medial malleolus  Patient demonstrated good technique and knowledge of calf stretch and theraband ankle inversion  No pain reported during activities  Patient performed tandem walking along foam balance beam with minimal loss of balance and minimal UE support  Patient performed standing on wobble board in A/P direction with minimal loss of balance, however in M/L direction noted some unsteadiness that required more UE support  Tolerated treatment well  Patient exhibited good technique with therapeutic exercises and would benefit from continued PT      Plan: Continue per plan of care  Possible discharge from PT next week       Diagnosis: Right ankle pain, pes planus  Precautions: DMII, HTN, cardiac cath, venous insufficiency, obesity  ( * asterisk indicates given per HEP)  Next Physician Appointment: 2023    Manuals 23   STM DK DK DK DK DK DK DK DK   IASTM DK DK DK DK  DK DK DK   Joint Mobs DK DK DK DK  DK DK DK              Neuro Re-Ed           Lena munguia*           Frog  x5min          Tandem amb FWD/BWD x3 laps FWD/BWD x3 laps FWD/BWD x3 laps FWD on foam x3 FWD/BWD on foam x3 laps FWD/BWD on foam x3 laps FWD/BWD on foam x3 laps FWD/BWD on foam x3 laps   Rhomberg on foam     test      Foam cone taps  2cone x15 2cone 2x10 2cone 2x10  3cones 2x10 3cones 2x10 3cones 2x10   Wobble Board  A/P x1min, Lat x1min A/P x1min, Lat x1min A/P x1min, Lat x1min  A/P x1min, Lat x1min A/P x1min, Lat x1min A/P x1min, Lat x1min                         Ther Ex           Calf stretch* 10"x10 10"x10 10"x10 10"x10  10"x10 10"x10 10"x10   Heel raises* 2x10 2x10 2x10 2x10  2x10     Post tib heel raises* With ball squeeze 2x10 With ball squeeze 2x10 With ball squeeze 2x10 With ball squeeze 2x10  With ball squeeze 2x10     Short foot           Ankle inv TB GTB 2x10 Blue TB 2x10 Blue TB 2x10 Blue TB 2x10  Blue TB 2x10 Blue TB 2x10 Blue TB 2x10                         Ther Activity           bike Recum x5min Recum x5min Recum x5min Recum x5min Recum x5min Recum x5min Recum x5min Recum x5min   Leg Press      lower platform 50# 2x10 lower platform 70# 2x10, 90# 2x10, 110# x10 lower platform 70# 2x10, 90# 2x10, 130# x10   STS practice                      Pt Ed           Re-Evaluation     DK      Modalities           Heat/ice (PRN)                          This patient was treated by QIANA Myers under the direct supervision of Beny Dc, PT, DPT

## 2023-01-13 ENCOUNTER — TELEPHONE (OUTPATIENT)
Dept: NEPHROLOGY | Facility: CLINIC | Age: 83
End: 2023-01-13

## 2023-01-13 NOTE — TELEPHONE ENCOUNTER
I called the patient and we spoke over the phone  Recent laboratory data were reviewed  Lab Results   Component Value Date    SODIUM 144 01/11/2023    K 4 0 01/11/2023     (H) 01/11/2023    CO2 30 01/11/2023    BUN 46 (H) 01/11/2023    CREATININE 2 36 (H) 01/11/2023    GLUC 139 01/11/2023    CALCIUM 9 1 01/11/2023     Creatinine slightly higher (2 36) since starting Losartan  Increase in creatinine is not unexpected  Given overall benefit of Losartan, would recommend continuing it at this time  Plan:   No changes  Continue Losartan 25 mg OD

## 2023-01-16 ENCOUNTER — OFFICE VISIT (OUTPATIENT)
Dept: PHYSICAL THERAPY | Facility: CLINIC | Age: 83
End: 2023-01-16

## 2023-01-16 DIAGNOSIS — M76.821 POSTERIOR TIBIAL TENDINITIS OF RIGHT LEG: Primary | ICD-10-CM

## 2023-01-16 DIAGNOSIS — M76.829 TIBIALIS TENDINITIS, UNSPECIFIED LATERALITY: ICD-10-CM

## 2023-01-16 NOTE — PROGRESS NOTES
Daily Note     Today's date: 2023  Patient name: Hortencia Henley  : 1940  MRN: [id number]  Referring provider: Darien Short*  Dx:   Encounter Diagnosis     ICD-10-CM    1  Posterior tibial tendinitis of right leg  M76 821       2  Tibialis tendinitis, unspecified laterality  M76 829           Start Time: 1600  Stop Time: 1645  Total time in clinic (min): 45 minutes    Subjective: Patient reported feeling good and stated "I have no pain today "      Objective: See treatment diary below      Assessment: Tolerated treatment well  Treatment initiated with IASTM around medial arch and medial malleolus, followed by subtalar joint mobs, calf stretching, and banded ankle inversion  Patient demonstrated good technique with activities  Patient performed tandem walk on foam with minimal loss of balance and slight UE support  Patient performed wobble board in M/L direction with minimal loss of balance, however more unsteadiness and loss of balance noted during A/P direction requiring more UE support  Patient increased weight on leg press to aid in LE strengthening and tolerated well  Patient demonstrated good understanding of all exercises  Patient exhibited good technique with therapeutic exercises      Plan: Potential discharge next visit       Diagnosis: Right ankle pain, pes planus  Precautions: DMII, HTN, cardiac cath, venous insufficiency, obesity  ( * asterisk indicates given per HEP)  Next Physician Appointment: 2023    Manuals 23   STM DK DK DK DK DK DK DK DK DK   IASTM DK DK DK DK  DK DK DK DK   Joint Mobs DK DK DK DK  DK DK DK DK               Neuro Re-Ed            Lena munguia*            Frog  x5min           Tandem amb FWD/BWD x3 laps FWD/BWD x3 laps FWD/BWD x3 laps FWD on foam x3 FWD/BWD on foam x3 laps FWD/BWD on foam x3 laps FWD/BWD on foam x3 laps FWD/BWD on foam x3 laps FWD/BWD on foam x3 laps   Rhomberg on foam test       Foam cone taps  2cone x15 2cone 2x10 2cone 2x10  3cones 2x10 3cones 2x10 3cones 2x10 3 cones 2x10   Wobble Board  A/P x1min, Lat x1min A/P x1min, Lat x1min A/P x1min, Lat x1min  A/P x1min, Lat x1min A/P x1min, Lat x1min A/P x1min, Lat x1min A/P x1min, Lat x1min                           Ther Ex            Calf stretch* 10"x10 10"x10 10"x10 10"x10  10"x10 10"x10 10"x10 10"x10   Heel raises* 2x10 2x10 2x10 2x10  2x10      Post tib heel raises* With ball squeeze 2x10 With ball squeeze 2x10 With ball squeeze 2x10 With ball squeeze 2x10  With ball squeeze 2x10      Short foot            Ankle inv TB GTB 2x10 Blue TB 2x10 Blue TB 2x10 Blue TB 2x10  Blue TB 2x10 Blue TB 2x10 Blue TB 2x10 Blue TB 2x10                           Ther Activity            bike Recum x5min Recum x5min Recum x5min Recum x5min Recum x5min Recum x5min Recum x5min Recum x5min Recum x5min   Leg Press      lower platform 50# 2x10 lower platform 70# 2x10, 90# 2x10, 110# x10 lower platform 70# 2x10, 90# 2x10, 130# x10 lower platform 70# 2x10, 90# 2x10, 110# 2x10, 130# 2x10, 150# x10   STS practice                        Pt Ed            Re-Evaluation     DK       Modalities            Heat/ice (PRN)                            This patient was treated by QIANA Myers under the direct supervision of Thelda Rubinstein, PT, DPT

## 2023-01-18 ENCOUNTER — OFFICE VISIT (OUTPATIENT)
Dept: PHYSICAL THERAPY | Facility: CLINIC | Age: 83
End: 2023-01-18

## 2023-01-18 DIAGNOSIS — M76.829 TIBIALIS TENDINITIS, UNSPECIFIED LATERALITY: ICD-10-CM

## 2023-01-18 DIAGNOSIS — M76.821 POSTERIOR TIBIAL TENDINITIS OF RIGHT LEG: Primary | ICD-10-CM

## 2023-01-18 NOTE — PROGRESS NOTES
Discharge Summary    Today's date: 2023  Patient name: Dino Betts  : 1940  MRN: [id number]  Referring provider: Mallika Nelson*  Dx:   Encounter Diagnosis     ICD-10-CM    1  Posterior tibial tendinitis of right leg  M76 821       2  Tibialis tendinitis, unspecified laterality  M76 829           Start Time: 1600  Stop Time: 1645  Total time in clinic (min): 45 minutes    Subjective: Patient arrives today reporting no pain  Patient is agreeable for discharge from PT today  Objective: See treatment diary below  See last re-evaluation for recent objective findings  Assessment: Tolerated treatment well  Patient noted to have improved stability on foam balance activities, but continues to require light UE support to avoid LOB or fall  Patient continues to be challenged with wobble board and requires UE to complete safely, but no increase in Left foot/ankle pain reported  Patient performed leg press with good motor control and no increase in pain  Patient exhibited good technique with therapeutic exercises  He was suggested to transition to maintenance program or join local gym with safe use of equipments as appropriate  He was in general suggested to continue exercises per HEP given; patient verbalized understanding of instructions         Plan: Discharge this visit to HEP     Diagnosis: Right ankle pain, pes planus  Precautions: DMII, HTN, cardiac cath, venous insufficiency, obesity  ( * asterisk indicates given per HEP)  Next Physician Appointment: 2023    Manuals 23   STM DK DK DK DK DK DK DK DK DK    IASTM DK DK DK DK  DK DK DK DK    Joint Mobs DK DK DK DK  DK DK DK DK                 Neuro Re-Ed             Lena munguia*             Frog  x5min            Tandem amb FWD/BWD x3 laps FWD/BWD x3 laps FWD/BWD x3 laps FWD on foam x3 FWD/BWD on foam x3 laps FWD/BWD on foam x3 laps FWD/BWD on foam x3 laps FWD/BWD on foam x3 laps FWD/BWD on foam x3 laps    Rhomberg on foam     test        Foam cone taps  2cone x15 2cone 2x10 2cone 2x10  3cones 2x10 3cones 2x10 3cones 2x10 3 cones 2x10 3 cones 2x10   Wobble Board  A/P x1min, Lat x1min A/P x1min, Lat x1min A/P x1min, Lat x1min  A/P x1min, Lat x1min A/P x1min, Lat x1min A/P x1min, Lat x1min A/P x1min, Lat x1min A/P x1min, Lat x1min                             Ther Ex             Calf stretch* 10"x10 10"x10 10"x10 10"x10  10"x10 10"x10 10"x10 10"x10    Heel raises* 2x10 2x10 2x10 2x10  2x10       Post tib heel raises* With ball squeeze 2x10 With ball squeeze 2x10 With ball squeeze 2x10 With ball squeeze 2x10  With ball squeeze 2x10       Short foot             Ankle inv TB GTB 2x10 Blue TB 2x10 Blue TB 2x10 Blue TB 2x10  Blue TB 2x10 Blue TB 2x10 Blue TB 2x10 Blue TB 2x10                              Ther Activity             bike Recum x5min Recum x5min Recum x5min Recum x5min Recum x5min Recum x5min Recum x5min Recum x5min Recum x5min Recum x5min   Leg Press      lower platform 50# 2x10 lower platform 70# 2x10, 90# 2x10, 110# x10 lower platform 70# 2x10, 90# 2x10, 130# x10 lower platform 70# 2x10, 90# 2x10, 110# 2x10, 130# 2x10, 150# x10 lower platform 110# 2x10, 130# 2x10, 150# x10   STS practice                          Pt Ed             Re-Evaluation     ENMANUEL SINGER, D/C   Modalities             Heat/ice (PRN)

## 2023-01-23 ENCOUNTER — APPOINTMENT (OUTPATIENT)
Dept: PHYSICAL THERAPY | Facility: CLINIC | Age: 83
End: 2023-01-23

## 2023-01-25 ENCOUNTER — APPOINTMENT (OUTPATIENT)
Dept: PHYSICAL THERAPY | Facility: CLINIC | Age: 83
End: 2023-01-25

## 2023-01-26 DIAGNOSIS — Z79.4 TYPE 2 DIABETES MELLITUS WITHOUT COMPLICATION, WITH LONG-TERM CURRENT USE OF INSULIN (HCC): ICD-10-CM

## 2023-01-26 DIAGNOSIS — I12.9 BENIGN HYPERTENSION WITH CHRONIC KIDNEY DISEASE, STAGE III (HCC): ICD-10-CM

## 2023-01-26 DIAGNOSIS — N18.30 BENIGN HYPERTENSION WITH CHRONIC KIDNEY DISEASE, STAGE III (HCC): ICD-10-CM

## 2023-01-26 DIAGNOSIS — I10 ESSENTIAL HYPERTENSION: ICD-10-CM

## 2023-01-26 DIAGNOSIS — E11.9 TYPE 2 DIABETES MELLITUS WITHOUT COMPLICATION, WITH LONG-TERM CURRENT USE OF INSULIN (HCC): ICD-10-CM

## 2023-01-26 RX ORDER — CLONIDINE 0.3 MG/24H
PATCH, EXTENDED RELEASE TRANSDERMAL
Qty: 4 PATCH | Refills: 0 | Status: SHIPPED | OUTPATIENT
Start: 2023-01-26

## 2023-01-26 RX ORDER — TORSEMIDE 10 MG/1
15 TABLET ORAL DAILY
Qty: 135 TABLET | Refills: 1 | Status: SHIPPED | OUTPATIENT
Start: 2023-01-26

## 2023-01-26 RX ORDER — LINAGLIPTIN 5 MG/1
5 TABLET, FILM COATED ORAL DAILY
Qty: 30 TABLET | Refills: 0 | Status: SHIPPED | OUTPATIENT
Start: 2023-01-26

## 2023-01-30 ENCOUNTER — APPOINTMENT (OUTPATIENT)
Dept: PHYSICAL THERAPY | Facility: CLINIC | Age: 83
End: 2023-01-30

## 2023-02-27 DIAGNOSIS — I10 ESSENTIAL HYPERTENSION: ICD-10-CM

## 2023-02-27 DIAGNOSIS — E11.9 TYPE 2 DIABETES MELLITUS WITHOUT COMPLICATION, WITH LONG-TERM CURRENT USE OF INSULIN (HCC): ICD-10-CM

## 2023-02-27 DIAGNOSIS — E78.2 MIXED HYPERLIPIDEMIA: ICD-10-CM

## 2023-02-27 DIAGNOSIS — Z79.4 TYPE 2 DIABETES MELLITUS WITHOUT COMPLICATION, WITH LONG-TERM CURRENT USE OF INSULIN (HCC): ICD-10-CM

## 2023-02-27 RX ORDER — PRAVASTATIN SODIUM 40 MG
TABLET ORAL
Qty: 90 TABLET | Refills: 0 | Status: SHIPPED | OUTPATIENT
Start: 2023-02-27

## 2023-02-27 RX ORDER — CLONIDINE 0.3 MG/24H
PATCH, EXTENDED RELEASE TRANSDERMAL
Qty: 4 PATCH | Refills: 0 | Status: SHIPPED | OUTPATIENT
Start: 2023-02-27

## 2023-02-27 RX ORDER — LINAGLIPTIN 5 MG/1
5 TABLET, FILM COATED ORAL DAILY
Qty: 30 TABLET | Refills: 0 | Status: SHIPPED | OUTPATIENT
Start: 2023-02-27

## 2023-03-30 DIAGNOSIS — I10 ESSENTIAL HYPERTENSION: ICD-10-CM

## 2023-03-30 DIAGNOSIS — N18.30 BENIGN HYPERTENSION WITH CHRONIC KIDNEY DISEASE, STAGE III (HCC): ICD-10-CM

## 2023-03-30 DIAGNOSIS — I12.9 BENIGN HYPERTENSION WITH CHRONIC KIDNEY DISEASE, STAGE III (HCC): ICD-10-CM

## 2023-03-30 DIAGNOSIS — E11.9 TYPE 2 DIABETES MELLITUS WITHOUT COMPLICATION, WITH LONG-TERM CURRENT USE OF INSULIN (HCC): ICD-10-CM

## 2023-03-30 DIAGNOSIS — Z79.4 TYPE 2 DIABETES MELLITUS WITHOUT COMPLICATION, WITH LONG-TERM CURRENT USE OF INSULIN (HCC): ICD-10-CM

## 2023-03-30 RX ORDER — SPIRONOLACTONE 25 MG/1
TABLET ORAL
Qty: 45 TABLET | Refills: 3 | Status: SHIPPED | OUTPATIENT
Start: 2023-03-30

## 2023-03-30 RX ORDER — CLONIDINE 0.3 MG/24H
PATCH, EXTENDED RELEASE TRANSDERMAL
Qty: 4 PATCH | Refills: 0 | Status: SHIPPED | OUTPATIENT
Start: 2023-03-30

## 2023-03-30 RX ORDER — LINAGLIPTIN 5 MG/1
5 TABLET, FILM COATED ORAL DAILY
Qty: 30 TABLET | Refills: 0 | Status: SHIPPED | OUTPATIENT
Start: 2023-03-30

## 2023-04-26 PROBLEM — E66.01 SEVERE OBESITY WITH BODY MASS INDEX (BMI) OF 35.0 TO 35.9 AND COMORBIDITY (HCC): Status: ACTIVE | Noted: 2021-07-19

## 2023-04-26 NOTE — PATIENT INSTRUCTIONS
Cholesterol and Your Health   WHAT YOU NEED TO KNOW:   What is cholesterol? Cholesterol is a waxy, fat-like substance  Your body uses cholesterol to make hormones and new cells, and to protect nerves  Cholesterol is made by your body  It also comes from certain foods you eat, such as meat and dairy products  Your healthcare provider can help you set goals for your cholesterol levels  He or she can help you create a plan to meet your goals  What are cholesterol level goals? Your cholesterol level goals depend on your risk for heart disease, your age, and your other health conditions  The following are general guidelines:  • Total cholesterol  includes low-density lipoprotein (LDL), high-density lipoprotein (HDL), and triglyceride levels  The total cholesterol level should be lower than 200 mg/dL and is best at about 150 mg/dL  • LDL cholesterol  is called bad cholesterol  because it forms plaque in your arteries  As plaque builds up, your arteries become narrow, and less blood flows through  When plaque decreases blood flow to your heart, you may have chest pain  If plaque completely blocks an artery that brings blood to your heart, you may have a heart attack  Plaque can break off and form blood clots  Blood clots may block arteries in your brain and cause a stroke  The level should be less than 130 mg/dL and is best at about 100 mg/dL  • HDL cholesterol  is called good cholesterol  because it helps remove LDL cholesterol from your arteries  It does this by attaching to LDL cholesterol and carrying it to your liver  Your liver breaks down LDL cholesterol so your body can get rid of it  High levels of HDL cholesterol can help prevent a heart attack and stroke  Low levels of HDL cholesterol can increase your risk for heart disease, heart attack, and stroke  The level should be 60 mg/dL or higher  • Triglycerides  are a type of fat that store energy from foods you eat   High levels of triglycerides also cause plaque buildup  This can increase your risk for a heart attack or stroke  If your triglyceride level is high, your LDL cholesterol level may also be high  The level should be less than 150 mg/dL  What increases my risk for high cholesterol? • Smoking cigarettes    • Being overweight or obese, or not getting enough exercise    • Drinking large amounts of alcohol    • A medical condition such as hypertension (high blood pressure) or diabetes    • Certain genes passed from your parents to you    • Age older than 72 years    What do I need to know about having my cholesterol levels checked? Adults 21to 39years of age should have their cholesterol levels checked every 4 to 6 years  Adults 45 years or older should have their cholesterol checked every 1 to 2 years  You may need your cholesterol checked more often, or at a younger age, if you have risk factors for heart disease  You may also need to have your cholesterol checked more often if you have other health conditions, such as diabetes  Blood tests are used to check cholesterol levels  Blood tests measure your levels of triglycerides, LDL cholesterol, and HDL cholesterol  How do healthy fats affect my cholesterol levels? Healthy fats, also called unsaturated fats, help lower LDL cholesterol and triglyceride levels  Healthy fats include the following:  • Monounsaturated fats  are found in foods such as olive oil, canola oil, avocado, nuts, and olives  • Polyunsaturated fats,  such as omega 3 fats, are found in fish, such as salmon, trout, and tuna  They can also be found in plant foods such as flaxseed, walnuts, and soybeans  How do unhealthy fats affect my cholesterol levels? Unhealthy fats increase LDL cholesterol and triglyceride levels  They are found in foods high in cholesterol, saturated fat, and trans fat:  • Cholesterol  is found in eggs, dairy, and meat      • Saturated fat  is found in butter, cheese, ice cream, whole milk, and coconut oil  Saturated fat is also found in meat, such as sausage, hot dogs, and bologna  • Trans fat  is found in liquid oils and is used in fried and baked foods  Foods that contain trans fats include chips, crackers, muffins, sweet rolls, microwave popcorn, and cookies  How is high cholesterol treated? Treatment for high cholesterol will also decrease your risk of heart disease, heart attack, and stroke  Treatment may include any of the following:  • Lifestyle changes  may include food, exercise, weight loss, and quitting smoking  You may also need to decrease the amount of alcohol you drink  Your healthcare provider will want you to start with lifestyle changes  Other treatment may be added if lifestyle changes are not enough  Your healthcare provider may recommend you work with a team to manage hyperlipidemia  The team may include medical experts such as a dietitian, an exercise or physical therapist, and a behavior therapist  Your family members may be included in helping you create lifestyle changes  • Medicines  may be given to lower your LDL cholesterol, triglyceride levels, or total cholesterol level  You may need medicines to lower your cholesterol if any of the following is true:    ? You have a history of stroke, TIA, unstable angina, or a heart attack  ? Your LDL cholesterol level is 190 mg/dL or higher  ? You are age 36 to 76 years, have diabetes or heart disease risk factors, and your LDL cholesterol is 70 mg/dL or higher  • Supplements  include fish oil, red yeast rice, and garlic  Fish oil may help lower your triglyceride and LDL cholesterol levels  It may also increase your HDL cholesterol level  Red yeast rice may help decrease your total cholesterol level and LDL cholesterol level  Garlic may help lower your total cholesterol level  Do not take any supplements without talking to your healthcare provider  What food changes can I make to lower my cholesterol levels?   A dietitian can help you create a healthy eating plan  He or she can show you how to read food labels and choose foods low in saturated fat, trans fats, and cholesterol  • Decrease the total amount of fat you eat  Choose lean meats, fat-free or 1% fat milk, and low-fat dairy products, such as yogurt and cheese  Try to limit or avoid red meats  Limit or do not eat fried foods or baked goods, such as cookies  • Replace unhealthy fats with healthy fats  Cook foods in olive oil or canola oil  Choose soft margarines that are low in saturated fat and trans fat  Seeds, nuts, and avocados are other examples of healthy fats  • Eat foods with omega-3 fats  Examples include salmon, tuna, mackerel, walnuts, and flaxseed  Eat fish 2 times per week  Pregnant women should not eat fish that have high levels of mercury, such as shark, swordfish, and abrahan mackerel  • Increase the amount of high-fiber foods you eat  High-fiber foods can help lower your LDL cholesterol  Aim to get between 20 and 30 grams of fiber each day  Fruits and vegetables are high in fiber  Eat at least 5 servings each day  Other high-fiber foods are whole-grain or whole-wheat breads, pastas, or cereals, and brown rice  Eat 3 ounces of whole-grain foods each day  Increase fiber slowly  You may have abdominal discomfort, bloating, and gas if you add fiber to your diet too quickly  • Eat healthy protein foods  Examples include low-fat dairy products, skinless chicken and turkey, fish, and nuts  • Limit foods and drinks that are high in sugar  Your dietitian or healthcare provider can help you create daily limits for high-sugar foods and drinks  The limit may be lower if you have diabetes or another health condition  Limits can also help you lose weight if needed  What lifestyle changes can I make to lower my cholesterol levels? • Maintain a healthy weight  Ask your healthcare provider what a healthy weight is for you   Ask him or her to help you create a weight loss plan if needed  Weight loss can decrease your total cholesterol and triglyceride levels  Weight loss may also help keep your blood pressure at a healthy level  • Be physically active throughout the day  Physical activity, such as exercise, can help lower your total cholesterol level and maintain a healthy weight  Physical activity can also help increase your HDL cholesterol level  Work with your healthcare provider to create an program that is right for you  Get at least 30 to 40 minutes of moderate physical activity most days of the week  Examples of exercise include brisk walking, swimming, or biking  Also include strength training at least 2 times each week  Your healthcare providers can help you create a physical activity plan  • Do not smoke  Nicotine and other chemicals in cigarettes and cigars can raise your cholesterol levels  Ask your healthcare provider for information if you currently smoke and need help to quit  E-cigarettes or smokeless tobacco still contain nicotine  Talk to your healthcare provider before you use these products  • Limit or do not drink alcohol  Alcohol can increase your triglyceride levels  Ask your healthcare provider before you drink alcohol  Ask how much is okay for you to drink in 24 hours or 1 week  CARE AGREEMENT:   You have the right to help plan your care  Discuss treatment options with your healthcare provider to decide what care you want to receive  You always have the right to refuse treatment  The above information is an  only  It is not intended as medical advice for individual conditions or treatments  Talk to your doctor, nurse or pharmacist before following any medical regimen to see if it is safe and effective for you  © Copyright Josel[employer] Hammond 2022 Information is for End User's use only and may not be sold, redistributed or otherwise used for commercial purposes    Diabetes and Nutrition   WHAT YOU NEED TO KNOW:   Why are nutrition plans important? Nutrition plans help keep blood sugar levels steady  They also help delay or prevent complications of diabetes, such as diabetic kidney disease  How do I create a nutrition plan? A dietitian will help you create a nutrition plan to meet your needs and your family's needs  He or she may explain a plan such as the Dietary Approaches to Stop Hypertension (DASH) eating plan or the Mediterranean diet  The goal is for you to reach and maintain healthy weight, blood sugar, blood pressure, and lipid levels  You should meet with the dietitian at least 1 time each year  You will learn the following:  • How food affects your blood sugar levels    • How to create healthy eating habits    • How to make food choices based on your activity level, weight, and glucose levels    • How your favorite foods may fit into your plan    • Foods that contain carbohydrates (sugars and starches), including simple and complex carbohydrates    • How to keep track of all carbohydrates    • Correct portion sizes for each food    • Changes you can make to your plan if you get pregnant or are breastfeeding    What are some tips to do until I meet with the dietitian? • Do not skip meals  The goal is to keep your blood sugar level steady  Blood sugar levels may drop too low if you have received insulin and do not eat  • Eat more high-fiber foods  Examples include fresh or frozen fruits and vegetables, whole-grain breads, and beans  Fiber helps control or lower blood sugar and cholesterol levels  Choose whole fruits instead of fruit juice as much as possible  Sugar may be added to juice, and fiber may be removed  • Choose heart-healthy fats  Foods high in heart-healthy fats include olive oil, nuts, avocados, and fatty fish, such as salmon and tuna  Foods high in unhealthy fats include red meat, full-fat dairy products, and soft margarine   Unhealthy fats can increase your risk for heart disease, increase bad cholesterol, and lower good cholesterol  • Choose complex carbohydrates  Foods with complex carbohydrates include brown rice, whole-grain breads and cereals, and cooked beans  Foods with simple carbohydrates include white bread, white rice, most cold cereals, and snack foods  Your plan will include the amount of carbohydrate to have at one time or in a day  Your blood sugar level can get too high if you eat too much carbohydrate at one time  Blood sugar levels do not spike as high or drop as quickly with complex carbohydrates as with simple carbohydrates  Choose complex carbohydrates whenever possible  • Have less sodium (salt)  The risk for high blood pressure (BP) increases with high-sodium foods  Limit high-sodium foods, such as soy sauce, potato chips, and canned soup  Do not add salt to food you cook  Limit your use of table salt  Read labels to have no more than 2,300 milligrams of sodium in one day  • Limit artificial sweeteners  These may be found in food or drinks, such as diet soft drinks or other low-calorie beverages  Artificial sweeteners are low in calories  They may help you lower your overall calories and carbohydrates  It is important not to have more calories from other foods to make up for the calories saved  Artificial sweeteners do not have any nutrition  Eat whole foods and drink water as much as possible  Your plan may include beverages with artificial sweeteners for a short time  These can help you transition from high-sugar beverages to water  • Use the plate method for each meal   This method can help you eat the right amount of carbohydrates and keep your blood sugar levels under control  ? Draw an imaginary line down the middle of a 9-inch dinner plate  On one side, draw another line to divide that section in half  Your plate will have one large section and 2 small sections  ? Fill the largest section with non-starchy vegetables  These include broccoli, spinach, cucumbers, peppers, cauliflower, and tomatoes  ? Add a starch to one of the small sections  Starches include pasta, rice, whole-grain bread, tortillas, corn, potatoes, and beans  ? Add meat or another source of protein to the other small section  Examples include chicken or turkey without skin, fish, lean beef or pork, low-fat cheese, tofu, and eggs  ? Add dairy products or fruit next to your plate if your meal plan allows  Examples of dairy include skim or 1% milk and low-fat yogurt  If you do not drink milk or eat dairy products, you may be able to add another serving of starchy food instead  ? Have a low-calorie or calorie-free drink with your meal   Examples include water or unsweetened tea or coffee  What do I need to know if I choose to drink alcohol? • Alcohol can cause health problems  Alcohol can cause hypoglycemia (very low blood sugar level), especially if you use insulin  Alcohol can cause high blood sugar and BP levels, and weight gain if you drink too much  • Hypoglycemia can happen hours after you drink alcohol  Check your blood sugar level for several hours after you drink alcohol  Have a source of fast-acting carbohydrates with you in case your level goes too low  You need immediate care if you have signs or symptoms of hypoglycemia, such as sweating, confusion, or fainting  • Limit alcohol as directed  Generally, men 72 or older and women should limit alcohol to 1 drink within 24 hours and 7 within 1 week  Men 21 to 64 years should limit alcohol to 2 drinks a day and 14 within 1 week  Your healthcare provider can tell you how many drinks are okay for you within 24 hours or within 1 week  A drink of alcohol is 12 ounces of beer, 5 ounces of wine, or 1½ ounces of liquor  Always have food when you drink alcohol  Your blood sugar may fall to a low level if you drink when your stomach is empty  • Always have food when you drink alcohol  Your blood sugar may fall to a low level if you drink when your stomach is empty  Why is it important to maintain a healthy weight? A healthy weight can help you control your diabetes  You can maintain a healthy weight with a nutrition plan and regular physical activity  Ask your healthcare provider what a healthy weight is for you  Ask him or her to help you create a weight loss plan, if needed  Together you can set weight loss and maintenance goals  For example, your goal may be to lose at least 7% of your extra weight in the first 6 months  Call your local emergency number [phone] in the [address] [facility]) if:   • You have any of the following signs of a heart attack:      ? Squeezing, pressure, or pain in your chest    ? You may  also have any of the following:     - Discomfort or pain in your back, neck, jaw, stomach, or arm    - Shortness of breath    - Nausea or vomiting    - Lightheadedness or a sudden cold sweat      When should I seek immediate care? • You have a low blood sugar level and it does not improve with treatment  Symptoms are trouble thinking, a pounding heartbeat, and sweating  • Your blood sugar level is above 240 mg/dL and does not come down within 15 minutes of treatment  • You have ketones in your blood or urine  • You have nausea or are vomiting and cannot keep any food or liquid down  • You have blurred or double vision  • Your breath has a fruity, sweet smell, or your breathing is shallow  When should I call my doctor or diabetes care team?   • Your blood sugar levels are higher than your target goals  • You often have low blood sugar levels  • You have trouble coping with diabetes, or you feel anxious or depressed  • You have questions or concerns about your condition or care  CARE AGREEMENT:   You have the right to help plan your care  Learn about your health condition and how it may be treated   Discuss treatment options with your healthcare providers to decide what care you want to receive  You always have the right to refuse treatment  The above information is an  only  It is not intended as medical advice for individual conditions or treatments  Talk to your doctor, nurse or pharmacist before following any medical regimen to see if it is safe and effective for you  © Copyright Kat [employer]ux 2022 Information is for End User's use only and may not be sold, redistributed or otherwise used for commercial purposes  Diabetes and Exercise   WHAT YOU NEED TO KNOW:   How will exercise help me manage diabetes? Physical activity, such as exercise, can help keep your blood sugar level steady or improve insulin resistance  Activity can help decrease your risk for heart disease, and help you lose weight  Exercise can also help lower your A1c or keep it at goal  Your diabetes care team will help you create an exercise plan  The plan will be based on the type of diabetes you have and your starting fitness level  What are some tips to help me create and meet my exercise goals? • Set a goal for 150 minutes (2 5 hours) of moderate to vigorous aerobic activity each week  Aerobic activity helps your heart stay strong  Aerobic activity includes walking, bicycling, dancing, swimming, and raking leaves  Spread aerobic activity over 3 to 5 days  Do not take more than 2 days off in a row  It is best to do at least 10 minutes at a time and 30 minutes each day  You can work up to these goals  Remember that any activity is better than no activity  Over time, you can make exercise more intense or last longer  You can also add more days of exercise as your fitness level improves  Your diabetes care team can help you make a step-by-step plan to achieve your goals  • Set a strength training goal of 2 to 3 times a week  Take at least 1 day off in between strength training sessions  Strength training helps you keep the muscles you have and build new muscles   Strength training includes lifting weights, climbing stairs, yoga, and julianne chi          • Older adults should include balance training 2 to 3 times each week  These include walking backwards, standing on one foot, and walking heel to toe in a straight line  What are some other healthy activity tips? • Stretch before and after you exercise to prevent injury  • Drink water or liquids that do not contain sugar before, during, and after exercise  Ask your dietitian or healthcare provider which liquids you should drink when you exercise  • Do not  sit for longer than 30 minutes at a time  If you cannot walk around, at least stand up  This will help you stay active and keep your blood circulating  Try to be active throughout your day  What do I need to know about exercise and my blood sugar levels? Check your blood sugar level before and after exercise, if you use insulin  Healthcare providers may tell you to change the amount of insulin you take or food you eat  • If your blood sugar level is high, check your blood or urine for ketones before you exercise  Do not exercise if your blood sugar level is high and you have ketones  • If your blood sugar level is less than 100 mg/dL, have a carbohydrate snack before you exercise  Examples are 4 to 6 crackers, ½ banana, 8 ounces (1 cup) of milk, or 4 ounces (½ cup) of juice  Call your local emergency number (911 in the [address] [place] [address]) if:   • You have chest pain or shortness of breath  When should I seek immediate care? • You have a low blood sugar level and it does not improve with treatment  Symptoms are trouble thinking, a pounding heartbeat, and sweating  • Your blood sugar level is above 240 mg/dL and does not come down within 15 minutes of treatment  • You have blurred or double vision  • Your breath has a fruity, sweet smell, or your breathing is shallow      When should I call my doctor or diabetes care team?   • You have ketones in your blood or urine  • You have a fever  • Your blood sugar levels are higher than your target goals  • You often have low blood sugar levels  • Your skin is red, dry, warm, or swollen  • You have a wound that does not heal     • You have trouble coping with diabetes, or you feel anxious or depressed  • You have questions or concerns about your condition or care  CARE AGREEMENT:   You have the right to help plan your care  Learn about your health condition and how it may be treated  Discuss treatment options with your healthcare providers to decide what care you want to receive  You always have the right to refuse treatment  The above information is an  only  It is not intended as medical advice for individual conditions or treatments  Talk to your doctor, nurse or pharmacist before following any medical regimen to see if it is safe and effective for you  © Copyright Denzel Mate 2022 Information is for End User's use only and may not be sold, redistributed or otherwise used for commercial purposes  Chronic Kidney Disease Diet   WHAT YOU NEED TO KNOW:   What is a chronic kidney disease (CKD) diet? A CKD diet limits protein, phosphorus, sodium, and potassium  Liquids may also need to be limited in later stages of CKD  This diet can help slow down the rate of damage to your kidneys  Your diet may change over time as your health condition changes  You may also need to make other diet changes if you have other health problems, such as diabetes  What kind of diet changes are needed? There are 5 stages of CKD  The diet changes you need to make are based on your stage of kidney disease  Work with your dietitian or healthcare provider to plan meals that are right for you  You may need any of the following:  • Limit protein  in all stages of kidney disease  Limit the portion sizes of protein you eat to limit the amount of work your kidneys have to do   Foods that are high in protein are meat, poultry (chicken and turkey), fish, eggs, and dairy (milk, cheese, yogurt)  Your healthcare provider will tell you how much protein to eat each day  • Limit sodium  as directed  You may need to limit sodium to less than 2,300 milligrams (mg) each day  Ask your dietitian or healthcare provider how much sodium you can have each day  The amount depends on your stage of kidney disease  Table salt, canned foods, soups, salted snacks, and processed meats, like deli meats and sausage, are high in sodium  • Limit the amount of phosphorus  you eat  Your kidneys cannot get rid of extra phosphorus that builds up in your blood  This may cause your bones to lose calcium and weaken  Foods that are high in phosphorus are dairy products, beans, peas, nuts, and whole grains  Phosphorus is also found in cocoa, beer, and cola drinks  Your healthcare provider will tell you how much phosphorus you can have each day  • Limit potassium  if your potassium blood levels are too high  Your dietitian or healthcare provider will tell you if you need to limit potassium  Potassium is found in fruits and vegetables  • Limit liquids  as directed  Your healthcare provider may recommend that you limit liquids in stages 4 and 5 of kidney disease  If your body retains fluids, you will have swelling and fluid may build up in your lungs  This can cause other health problems, such as shortness of breath  What foods can I include? Your dietitian will tell you how many servings you can have from each of the food groups below  The approximate amount of these nutrients is listed next to each food group  Read the food label to find the exact amount  • Bread, cereal, and grains: These foods contain about 80 calories, 2 grams (g) of protein, 150 mg of sodium, 50 mg of potassium, and 30 mg of phosphorus  ? 1 slice (1 ounce) of bread ([place], [place], raisin, light rye, or sourdough white), small dinner roll, or 6-inch tortilla    ?  ½ of a hamburger bun, hot dog bun, or English muffin or ¼ of a bagel    ? 1 cup of unsweetened cereal or ½ cup of cooked cereal, such as cream of wheat    ? ? cup of cooked pasta (noodles, macaroni, or spaghetti) or rice    ? 4 (2-inch) unsalted crackers or 3 squares of dennis crackers    ? 3 cups of air-popped, unsalted popcorn    ? ¾ ounce of unsalted pretzels    • Vegetables:  A serving of these foods contains about 30 calories, 2 g of protein, 50 mg of sodium, and 50 mg of phosphorus  ? Low potassium (less than 150 mg):      - ½ cup cooked green beans, cabbage, cauliflower, beets, or corn    - 1 cup raw cucumber, endive, alfalfa sprouts, cabbage, cauliflower, or watercress    - 1 cup of all types of lettuce    - ¼ cup cooked or ½ cup raw mushrooms or onions    - 1 cup cooked eggplant    ? Medium potassium (150 to 250 mg):      - 1 cup raw broccoli, celery, or zucchini    - ½ cup cooked broccoli, celery, green peas, summer squash, zucchini, or peppers    - 1 cup cooked kale or turnips    • Fruits:  A serving of these foods contains about 60 calories, 0 g protein, 0 mg sodium, and 150 mg of phosphorus  Each serving is ½ cup, unless another amount is given  ? Low potassium (less than 150 mg): - Apple juice, applesauce, or 1 small apple    - Blueberries    - Cranberries or cranberry juice cocktail    - Fresh or canned pears (light syrup or packed in water)    - Grapes or grape juice    - Canned peaches (light syrup or packed in water)    - Pineapple or strawberries    - 1 tangerine    - Watermelon    ? Medium potassium (150 to 250 mg):      - Fresh peaches or pears    - Cherries    - Cantaloupe, araceli, or papaya    - Grapefruit or grapefruit juice    • Meat, poultry, and fish: These foods have about 75 calories, 7 g of protein, an average of 65 mg of sodium, 115 mg of potassium, and 70 mg of phosphorus  Do not use salt to prepare these foods  ? 1 ounce of cooked beef, pork, or poultry    ?  1 ounce of any fresh or frozen fish, lobster, shrimp, crab, clams, tuna, unsalted canned salmon, or unsalted sardines    • Other protein foods: These foods have about 90 calories, 7 g of protein, an average of 100 mg of sodium, 100 mg of potassium, and 120 mg of phosphorus  ? 1 large whole egg or ¼ cup of low-cholesterol egg substitute    ? 1 ounce of cheese    ? ¼ cup of cottage cheese or tofu    ? 1 ounce of unsalted nuts or 2 tablespoons of peanut butter    • Fats: These foods have very little protein and about 45 calories, 55 mg of sodium, 10 mg of potassium, and 5 mg of phosphorus  Include healthy fats, such as unsaturated fats, which are listed below  ? 1 teaspoon margarine or mayonnaise    ? 1 teaspoon oil (safflower, sunflower, corn, soybean, olive, peanut, canola)    ? 1 tablespoon oil-based salad dressing (such as [place]) or 2 tablespoons mayonnaise-based salad dressing (such as ranch)    What foods should I limit or avoid? • Starches: The following foods have higher amounts of sodium, potassium, or phosphorus  ? Biscuits, muffins, pancakes, and waffles    ? Cake and cornbread from boxed mixes    ? Oatmeal and whole-wheat cereals    ? Salted pretzel sticks or rings and sandwich cookies    • Meat and protein foods: The following are high in sodium and phosphorus  ? Deli-style meat, such as roast beef, ham, and turkey    ? Canned salmon and sardines    ? Processed cheese, such as American cheese and cheese spreads    ? Smoked or cured meat, such as corned beef, gomes, ham, hot dogs, and sausage    • Legumes: These foods have about 90 calories, 6 g of protein, less than 10 mg of sodium, 250 mg of potassium, and 100 mg of phosphorus  ? ? cup of black beans, red kidney beans, black-eye peas, garbanzos, and lentils    ? ¼ cup of green or mature soybeans    • Dairy: The following foods have about 8 g of protein, an average of 120 mg of sodium, 350 mg of potassium, and 220 mg of phosphorus      ? 1 cup of milk (fat-free, low-fat, whole, buttermilk, or chocolate milk)    ? 1 cup of low-fat plain or sugar-free yogurt or ice cream    ? ½ cup of pudding or custard    ? Nondairy milk substitutes: These foods have 75 calories, 1 gram of protein, and an average of 40 mg of sodium, 60 mg of potassium, and 60 mg of phosphorus  A serving is ½ cup of almond, rice, or soy milk, or nondairy creamer  • Vegetables: The following vegetables are high in potassium  Each serving has more than 250 mg of potassium  A serving is ½ cup, unless another amount is given  ? Artichoke or ¼ of a medium avocado    ? [place] sprouts, beets, chard, robert or mustard greens    ? Potatoes, sweet potatoes, pumpkin, and yams    ? ¾ cup of okra    ? Raw tomatoes and low-sodium tomato juice, or tomato sauce    ? Winter squash, cooked asparagus, and cooked spinach    • Fruit:  The following fruits are high in potassium  Each serving has more than 250 mg of potassium  ? 3 fresh apricots    ? 1 small nectarine (2 inches across)    ? 1 small orange and ½ cup of orange juice    ? ¼ cup of dates    ? ? of a small honeydew melon    ? 1 six-inch banana    ? ½ cup of prune juice or prunes and kiwifruit    • Fats:  Limit unhealthy fats, such as saturated fats, which are listed below  ? Butter, lard, cream cheese, whipped cream, and sour cream    ? Powdered coffee creamer    • Other: The following foods are high in sodium  ? Frozen dinners, soups, and fast foods, such as hamburgers and pizza (see the food label for serving sizes)    ? Table salt and seasoned salts, such as onion or garlic salt    ? Barbecue sauce, ketchup, mustard, soy sauce, steak sauce, and teriyaki sauce    When should I call my nephrologist or dietitian? • You are gaining or losing weight very quickly  • You have shortness of breath  • You have nausea and are vomiting  • You feel very weak and tired  • You are having trouble following the CKD diet      CARE AGREEMENT:   You have the right to help plan your care  Discuss treatment options with your healthcare provider to decide what care you want to receive  You always have the right to refuse treatment  The above information is an  only  It is not intended as medical advice for individual conditions or treatments  Talk to your doctor, nurse or pharmacist before following any medical regimen to see if it is safe and effective for you  © Copyright Ivanna [employer] 2022 Information is for End User's use only and may not be sold, redistributed or otherwise used for commercial purposes  Medicare Preventive Visit Patient Instructions  Thank you for completing your Welcome to Medicare Visit or Medicare Annual Wellness Visit today  Your next wellness visit will be due in one year (4/27/2024)  The screening/preventive services that you may require over the next 5-10 years are detailed below  Some tests may not apply to you based off risk factors and/or age  Screening tests ordered at today's visit but not completed yet may show as past due  Also, please note that scanned in results may not display below  Preventive Screenings:  Service Recommendations Previous Testing/Comments   Colorectal Cancer Screening  · Colonoscopy    · Fecal Occult Blood Test (FOBT)/Fecal Immunochemical Test (FIT)  · Fecal DNA/Cologuard Test  · Flexible Sigmoidoscopy Age: 39-70 years old   Colonoscopy: every 10 years (May be performed more frequently if at higher risk)  OR  FOBT/FIT: every 1 year  OR  Cologuard: every 3 years  OR  Sigmoidoscopy: every 5 years  Screening may be recommended earlier than age 39 if at higher risk for colorectal cancer  Also, an individualized decision between you and your healthcare provider will decide whether screening between the ages of 74-80 would be appropriate   Colonoscopy: Not on file  FOBT/FIT: Not on file  Cologuard: Not on file  Sigmoidoscopy: Not on file          Prostate Cancer Screening Individualized decision between patient and health care provider in men between ages of 53-78   Medicare will cover every 12 months beginning on the day after your 50th birthday PSA: 2 1 ng/mL     Screening Not Indicated     Hepatitis C Screening Once for adults born between 1945 and 1965  More frequently in patients at high risk for Hepatitis C Hep C Antibody: Not on file        Diabetes Screening 1-2 times per year if you're at risk for diabetes or have pre-diabetes Fasting glucose: 157 mg/dL (10/25/2022)  A1C: 7 0 % (10/25/2022)  Screening Not Indicated  History Diabetes   Cholesterol Screening Once every 5 years if you don't have a lipid disorder  May order more often based on risk factors  Lipid panel: 10/25/2022  Screening Not Indicated  History Lipid Disorder      Other Preventive Screenings Covered by Medicare:  1  Abdominal Aortic Aneurysm (AAA) Screening: covered once if your at risk  You're considered to be at risk if you have a family history of AAA or a male between the age of 73-68 who smoking at least 100 cigarettes in your lifetime  2  Lung Cancer Screening: covers low dose CT scan once per year if you meet all of the following conditions: (1) Age 50-69; (2) No signs or symptoms of lung cancer; (3) Current smoker or have quit smoking within the last 15 years; (4) You have a tobacco smoking history of at least 20 pack years (packs per day x number of years you smoked); (5) You get a written order from a healthcare provider  3  Glaucoma Screening: covered annually if you're considered high risk: (1) You have diabetes OR (2) Family history of glaucoma OR (3)  aged 48 and older OR (3)  American aged 72 and older  3   Osteoporosis Screening: covered every 2 years if you meet one of the following conditions: (1) Have a vertebral abnormality; (2) On glucocorticoid therapy for more than 3 months; (3) Have primary hyperparathyroidism; (4) On osteoporosis medications and need to assess response to drug therapy  5  HIV Screening: covered annually if you're between the age of 12-76  Also covered annually if you are younger than 13 and older than 72 with risk factors for HIV infection  For pregnant patients, it is covered up to 3 times per pregnancy  Immunizations:  Immunization Recommendations   Influenza Vaccine Annual influenza vaccination during flu season is recommended for all persons aged >= 6 months who do not have contraindications   Pneumococcal Vaccine   * Pneumococcal conjugate vaccine = PCV13 (Prevnar 13), PCV15 (Vaxneuvance), PCV20 (Prevnar 20)  * Pneumococcal polysaccharide vaccine = PPSV23 (Pneumovax) Adults 25-60 years old: 1-3 doses may be recommended based on certain risk factors  Adults 72 years old: 1-2 doses may be recommended based off what pneumonia vaccine you previously received   Hepatitis B Vaccine 3 dose series if at intermediate or high risk (ex: diabetes, end stage renal disease, liver disease)   Tetanus (Td) Vaccine - COST NOT COVERED BY MEDICARE PART B Following completion of primary series, a booster dose should be given every 10 years to maintain immunity against tetanus  Td may also be given as tetanus wound prophylaxis  Tdap Vaccine - COST NOT COVERED BY MEDICARE PART B Recommended at least once for all adults  For pregnant patients, recommended with each pregnancy  Shingles Vaccine (Shingrix) - COST NOT COVERED BY MEDICARE PART B  2 shot series recommended in those aged 48 and above     Health Maintenance Due:  There are no preventive care reminders to display for this patient  Immunizations Due:      Topic Date Due   • COVID-19 Vaccine (3 - Booster for Porter Rajesh series) 02/17/2022     Advance Directives   What are advance directives? Advance directives are legal documents that state your wishes and plans for medical care  These plans are made ahead of time in case you lose your ability to make decisions for yourself   Advance directives can apply to any medical decision, such as the treatments you want, and if you want to donate organs  What are the types of advance directives? There are many types of advance directives, and each state has rules about how to use them  You may choose a combination of any of the following:  · Living will: This is a written record of the treatment you want  You can also choose which treatments you do not want, which to limit, and which to stop at a certain time  This includes surgery, medicine, IV fluid, and tube feedings  · Durable power of  for healthcare [facility] [place]): This is a written record that states who you want to make healthcare choices for you when you are unable to make them for yourself  This person, called a proxy, is usually a family member or a friend  You may choose more than 1 proxy  · Do not resuscitate (DNR) order:  A DNR order is used in case your heart stops beating or you stop breathing  It is a request not to have certain forms of treatment, such as CPR  A DNR order may be included in other types of advance directives  · Medical directive: This covers the care that you want if you are in a coma, near death, or unable to make decisions for yourself  You can list the treatments you want for each condition  Treatment may include pain medicine, surgery, blood transfusions, dialysis, IV or tube feedings, and a ventilator (breathing machine)  · Values history: This document has questions about your views, beliefs, and how you feel and think about life  This information can help others choose the care that you would choose  Why are advance directives important? An advance directive helps you control your care  Although spoken wishes may be used, it is better to have your wishes written down  Spoken wishes can be misunderstood, or not followed  Treatments may be given even if you do not want them  An advance directive may make it easier for your family to make difficult choices about your care  Weight Management   Why it is important to manage your weight:  Being overweight increases your risk of health conditions such as heart disease, high blood pressure, type 2 diabetes, and certain types of cancer  It can also increase your risk for osteoarthritis, sleep apnea, and other respiratory problems  Aim for a slow, steady weight loss  Even a small amount of weight loss can lower your risk of health problems  How to lose weight safely:  A safe and healthy way to lose weight is to eat fewer calories and get regular exercise  You can lose up about 1 pound a week by decreasing the number of calories you eat by 500 calories each day  Healthy meal plan for weight management:  A healthy meal plan includes a variety of foods, contains fewer calories, and helps you stay healthy  A healthy meal plan includes the following:  · Eat whole-grain foods more often  A healthy meal plan should contain fiber  Fiber is the part of grains, fruits, and vegetables that is not broken down by your body  Whole-grain foods are healthy and provide extra fiber in your diet  Some examples of whole-grain foods are whole-wheat breads and pastas, oatmeal, brown rice, and bulgur  · Eat a variety of vegetables every day  Include dark, leafy greens such as spinach, kale, robert greens, and mustard greens  Eat yellow and orange vegetables such as carrots, sweet potatoes, and winter squash  · Eat a variety of fruits every day  Choose fresh or canned fruit (canned in its own juice or light syrup) instead of juice  Fruit juice has very little or no fiber  · Eat low-fat dairy foods  Drink fat-free (skim) milk or 1% milk  Eat fat-free yogurt and low-fat cottage cheese  Try low-fat cheeses such as mozzarella and other reduced-fat cheeses  · Choose meat and other protein foods that are low in fat  Choose beans or other legumes such as split peas or lentils   Choose fish, skinless poultry (chicken or turkey), or lean cuts of red meat (beef or pork)  Before you cook meat or poultry, cut off any visible fat  · Use less fat and oil  Try baking foods instead of frying them  Add less fat, such as margarine, sour cream, regular salad dressing and mayonnaise to foods  Eat fewer high-fat foods  Some examples of high-fat foods include french fries, doughnuts, ice cream, and cakes  · Eat fewer sweets  Limit foods and drinks that are high in sugar  This includes candy, cookies, regular soda, and sweetened drinks  Exercise:  Exercise at least 30 minutes per day on most days of the week  Some examples of exercise include walking, biking, dancing, and swimming  You can also fit in more physical activity by taking the stairs instead of the elevator or parking farther away from stores  Ask your healthcare provider about the best exercise plan for you  © Copyright [employer] 2018 Information is for End User's use only and may not be sold, redistributed or otherwise used for commercial purposes  All illustrations and images included in CareNotes® are the copyrighted property of [employer]  or [address] [place] [address]  Type 2 Diabetes Management for Adults   AMBULATORY CARE:   Type 2 diabetes  is a disease that affects how your body uses glucose (sugar)  Either your body cannot make enough insulin, or it cannot use the insulin correctly  It is important to keep diabetes controlled to prevent damage to your heart, blood vessels, and other organs  Management will help you feel well and enjoy your daily activities  Your diabetes care team providers can help you make a plan to fit diabetes care into your schedule  Your plan can change over time to fit your needs and your family's needs  Have someone call your local emergency number (911 in the [address] [facility] [address]) if:   • You cannot be woken  • You have signs of diabetic ketoacidosis:     ? confusion, fatigue    ? vomiting    ?  rapid heartbeat    ? fruity smelling breath    ? extreme thirst    ? dry mouth and skin    • You have any of the following signs of a heart attack:      ? Squeezing, pressure, or pain in your chest    ? You may  also have any of the following:     - Discomfort or pain in your back, neck, jaw, stomach, or arm    - Shortness of breath    - Nausea or vomiting    - Lightheadedness or a sudden cold sweat    • You have any of the following signs of a stroke:      ? Numbness or drooping on one side of your face     ? Weakness in an arm or leg    ? Confusion or difficulty speaking    ? Dizziness, a severe headache, or vision loss    Call your doctor or diabetes care team provider if:   • You have a sore or wound that will not heal     • You have a change in the amount you urinate  • Your blood sugar levels are higher than your target goals  • You often have lower blood sugar levels than your target goals  • Your skin is red, dry, warm, or swollen  • You have trouble coping with diabetes, or you feel anxious or depressed  • You have questions or concerns about your condition or care  What you need to know about high blood sugar levels:  High blood sugar levels may not cause any symptoms  You may feel more thirsty or urinate more often than usual  Over time, high blood sugar levels can damage your nerves, blood vessels, tissues, and organs  The following can increase your blood sugar levels:  • Large meals or large amounts of carbohydrates at one time    • Less physical activity    • Stress    • Illness    • A lower dose of diabetes medicine or insulin, or a late dose    What you need to know about low blood sugar levels:  Symptoms include feeling shaky, dizzy, irritable, or confused  You can prevent symptoms by keeping your blood sugar levels from going too low  • Treat a low blood sugar level right away:      ? Drink 4 ounces of juice or have 1 tube of glucose gel  ? Check your blood sugar level again 10 to 15 minutes later  ?  When the level goes back to normal, eat a meal or snack to prevent another decrease  • Keep glucose gel, raisins, or hard candy with you at all times to treat a low blood sugar level  • Your blood sugar level can get too low if you take diabetes medicine or insulin and do not eat enough food  • If you use insulin, check your blood sugar level before you exercise  ? If your blood sugar level is below 100 mg/dL, eat 4 crackers or 2 ounces of raisins, or drink 4 ounces of juice  ? Check your level every 30 minutes if you exercise longer than 1 hour  ? You may need a snack during or after exercise  What you can do to manage your blood sugar levels:   • Check your blood sugar levels as directed and as needed  Several items are available to use to check your levels  You may need to check by testing a drop of blood in a glucose monitor  You may instead be given a continuous glucose monitoring (CGM) device  The device is worn at all times  The CGM checks your blood sugar level every 5 minutes  It sends results to an electronic device such as a smart phone  A CGM can be used with or without an insulin pump  You and your diabetes care team providers will decide on the best method for you  The goal for blood sugar levels before meals  is between 80 and 130 mg/dL and 2 hours after eating  is lower than 180 mg/dL  • Make healthy food choices  Work with a dietitian to develop a meal plan that works for you and your schedule  A dietitian can help you learn how to eat the right amount of carbohydrates during your meals and snacks  Carbohydrates can raise your blood sugar level if you eat too many at one time  Examples of foods that contain carbohydrates are breads, cereals, rice, pasta, and sweets  • Eat high-fiber foods as directed  Fiber helps improve blood sugar levels  Fiber also lowers your risk for heart disease and other problems diabetes can cause   Examples of high-fiber foods include vegetables, whole-grain bread, and beans such as colmenares beans  Your dietitian can tell you how much fiber to have each day  • Get regular physical activity  Physical activity can help you get to your target blood sugar level goal and manage your weight  Get at least 150 minutes of moderate to vigorous aerobic physical activity each week  Do not miss more than 2 days in a row  Do not sit longer than 30 minutes at a time  Your healthcare provider can help you create an activity plan  The plan can include the best activities for you and can help you build your strength and endurance  • Maintain a healthy weight  Ask your team what a healthy weight is for you  A healthy weight can help you control diabetes and prevent heart disease  Ask your team to help you create a weight loss plan, if needed  Weight loss can help make a difference in managing diabetes  Your team will help you set a weight-loss goal, such as 10 to 15 pounds, or 5% of your extra weight  Together you and your team can set manageable weight loss goals  • Take your diabetes medicine or insulin as directed  You may need diabetes medicine, insulin, or both to help control your blood sugar levels  Your healthcare provider will teach you how and when to take your diabetes medicine or insulin  You will also be taught about side effects oral diabetes medicine can cause  Insulin may be injected or given through a pump or pen  You and your providers will decide on the best method for you:    ? An insulin pump  is an implanted device that gives your insulin 24 hours a day  An insulin pump prevents the need for multiple insulin injections in a day  ? An insulin pen  is a device prefilled with the right amount of insulin  ? You and your family members will be taught how to draw up and give insulin  if this is the best method for you  Your providers will also teach you how to dispose of needles and syringes  ?  You will learn how much insulin you need  and when to give it  You will be taught when not to give insulin  You will also be taught what to do if your blood sugar level drops too low  This may happen if you take insulin and do not eat the right amount of carbohydrates  More ways to manage type 2 diabetes:   • Wear medical alert identification  Wear medical alert jewelry or carry a card that says you have diabetes  Ask your provider where to get these items  • Do not smoke  Nicotine and other chemicals in cigarettes and cigars can cause lung and blood vessel damage  It also makes it more difficult to manage your diabetes  Ask your provider for information if you currently smoke and need help to quit  Do not use e-cigarettes or smokeless tobacco in place of cigarettes or to help you quit  They still contain nicotine  • Check your feet each day for cuts, scratches, calluses, or other wounds  Look for redness and swelling, and feel for warmth  Wear shoes that fit well  Check your shoes for rocks or other objects that can hurt your feet  Do not walk barefoot or wear shoes without socks  Wear cotton socks to help keep your feet dry  • Ask about vaccines you may need  You have a higher risk for serious illness if you get the flu, pneumonia, COVID-19, or hepatitis  Ask your provider if you should get vaccines to prevent these or other diseases, and when to get the vaccines  • Talk to your provider if you become stressed about diabetes care  Sometimes being able to fit diabetes care into your life can cause increased stress  The stress can cause you not to take care of yourself properly  Your care team providers can help by offering tips about self-care  Your providers may suggest you talk to a mental health provider who can listen and offer help with self-care issues  • Have your A1c checked as directed  Your provider may check your A1c every 3 months, or 2 times each year if your diabetes is controlled   An A1c test shows the average amount of sugar in your blood over the past 2 to 3 months  Your provider will tell you what your A1c level should be  • Have screening tests as directed  Your provider may recommend screening for complications of diabetes and other conditions that may develop  Some screenings may begin right away and some may happen within the first 5 years of diagnosis:    ? Examples of diabetes complications  include kidney problems, high cholesterol, high blood pressure, blood vessel problems, eye problems, and sleep apnea  ? You may be screened for a low vitamin B level  if you take oral diabetes medicine for a long time  ? Women of childbearing years may be screened  for polycystic ovarian syndrome (PCOS)  Follow up with your doctor or diabetes care team providers as directed: You may need to have blood tests done before your follow-up visit  The test results will show if changes need to be made in your treatment or self-care  Talk to your provider if you cannot afford your medicine  Write down your questions so you remember to ask them during your visits  © Copyright [employer] 2022 Information is for End User's use only and may not be sold, redistributed or otherwise used for commercial purposes  The above information is an  only  It is not intended as medical advice for individual conditions or treatments  Talk to your doctor, nurse or pharmacist before following any medical regimen to see if it is safe and effective for you  Basic Carbohydrate Counting   AMBULATORY CARE:   Carbohydrate counting  is a way to plan your meals by counting the amount of carbohydrate in foods  Carbohydrates are the sugars, starches, and fiber found in fruit, grains, vegetables, and milk products  Carbohydrates increase your blood sugar levels  Carbohydrate counting can help you eat the right amount of carbohydrate to keep your blood sugar levels under control     What you need to know about planning meals using carbohydrate counting:  • A dietitian or healthcare provider will help you develop a healthy meal plan that works best for you  You will be taught how much carbohydrate to eat or drink for each meal and snack  Your meal plan will be based on your age, weight, usual food intake, and physical activity level  If you have diabetes, it will also include your blood sugar levels and diabetes medicine  Once you know how much carbohydrate you should eat, you can decide what type of food you want to eat  • You will need to know what foods contain carbohydrate and how much they contain  Keep track of the amount of carbohydrate in meals and snacks in order to follow your meal plan  Do not avoid carbohydrates or skip meals  Your blood sugar may fall too low if you do not eat enough carbohydrate or you skip meals  Foods that contain carbohydrate:   • Breads:  Each serving of food listed below contains about 15 g of carbohydrate   ? 1 slice of bread (1 ounce) or 1 flour or corn tortilla (6 inch)    ? ½ of a hamburger bun or ¼ of a large bagel (about 1 ounce)    ? 1 pancake (about 4 inches across and ¼ inch thick)    • Cereals and grains:  Serving sizes of ready-to-eat cereals vary  Look at the serving size and the total carbohydrate amount listed on the food label  Each serving of food listed below contains about 15 g of carbohydrate   ? ¾ cup of dry, unsweetened, ready-to-eat cereal or ¼ cup of low-fat granola     ? ½ cup of oatmeal or other cooked cereal     ? ? cup of cooked rice or pasta    • Starchy vegetables and beans:  Each serving of food listed below contains about 15 g of carbohydrate   ? ½ cup of corn, green peas, sweet potatoes, or mashed potatoes    ? ¼ of a large baked potato    ? ½ cup of beans, lentils, and peas (garbanzo, colmenares, kidney, white, split, black-eyed)    • Crackers and snacks:  Each serving of food listed below contains about 15 g of carbohydrate       ? 3 dennis cracker squares or 8 animal crackers     ? 6 saltine-type crackers    ? 3 cups of popcorn or ¾ ounce of pretzels, potato chips, or tortilla chips    • Fruit:  Each serving of food listed below contains about 15 g of carbohydrate   ? 1 small (4 ounce) piece of fresh fruit or ¾ to 1 cup of fresh fruit    ? ½ cup of canned or frozen fruit, packed in natural juice    ? ½ cup (4 ounces) of unsweetened fruit juice    ? 2 tablespoons of dried fruit    • Desserts or sugary foods:  Each serving of food listed below contains about 15 g of carbohydrate   ? 2-inch square unfrosted cake or brownie     ? 2 small cookies    ? ½ cup of ice cream, frozen yogurt, or nondairy frozen yogurt    ? ¼ cup of sherbet or sorbet    ? 1 tablespoon of regular syrup, jam, or jelly    ? 2 tablespoons of light syrup    • Milk and yogurt:  Foods from the milk group contain about 12 g of carbohydrate per serving  ? 1 cup of fat-free or low-fat milk    ? 1 cup of soy milk    ? ? cup of fat-free, yogurt sweetened with artificial sweetener    • Non-starchy vegetables:  Each serving contains about 5 g of carbohydrate   Three servings of non-starch vegetables count as 1 carbohydrate serving  ? ½ cup of cooked vegetables or 1 cup of raw vegetables  This includes beets, broccoli, cabbage, cauliflower, cucumber, mushrooms, tomatoes, and zucchini    ? ½ cup of vegetable juice    How to use carbohydrate counting to plan meals:   • Count carbohydrate amounts using serving sizes:      ? Pasta dinner example: You plan to have pasta, tossed salad, and an 8-ounce glass of milk  Your healthcare provider tells you that you may have 4 carbohydrate servings for dinner  One carbohydrate serving of pasta is ? cup  One cup of pasta will equal 3 carbohydrate servings  An 8-ounce glass of milk will count as 1 carbohydrate serving  These amounts of food would equal 4 carbohydrate servings  One cup of tossed salad does not count toward your carbohydrate servings         • Count carbohydrate amounts using food labels:  Find the total amount of carbohydrate in a packaged food by reading the food label  Food labels tell you the serving size of the food and the total carbohydrate amount in each serving  Find the serving size on the food label and then decide how many servings you will eat  Multiply the number of servings you plan to eat by the carbohydrate amount per serving  ? Granola bar snack example: Your meal plan allows you to have 2 carbohydrate servings (30 grams) of carbohydrate for a snack  You plan to eat 1 package of granola bars, which contains 2 bars  According to the food label, the serving size of food in this package is 1 bar  Each serving (1 bar) contains 25 grams of carbohydrate  The total amount of carbohydrate in this package of granola bars would be 50 g  Based on your meal plan, you should eat only 1 bar  Follow up with your doctor as directed:  Write down your questions so you remember to ask them during your visits  © Copyright [employer] 2022 Information is for End User's use only and may not be sold, redistributed or otherwise used for commercial purposes  The above information is an  only  It is not intended as medical advice for individual conditions or treatments  Talk to your doctor, nurse or pharmacist before following any medical regimen to see if it is safe and effective for you  Foot Care for People with Diabetes   AMBULATORY CARE:   What you need to know about foot care:  Long-term high blood sugar levels can damage the blood vessels and nerves in your legs and feet  This damage makes it hard to feel pressure, pain, temperature, and touch  You may not be able to feel a cut or sore, or shoes that are too tight  Foot care is needed to prevent serious problems, such as an infection or amputation  Diabetes may cause your toes to become crooked or curved under   These changes may affect the way you walk and can lead to increased pressure on your foot  The pressure can decrease blood flow to your feet  Lack of blood flow increases your risk for a foot ulcer  Call your care team provider if:   • Your feet become numb, weak, or hard to move  • You have pus draining from a sore on your foot  • You have a wound on your foot that gets bigger, deeper, or does not heal     • You see blisters, cuts, scratches, calluses, or sores on your foot  • You have a fever, and your feet become red, warm, and swollen  • Your toenails become thick, curled, or yellow  • You find it hard to check your feet because your vision is poor  • You have questions or concerns about your condition or care  How to care for your feet:   • Check your feet each day  Look at your whole foot, including the bottom, and between and under your toes  Check for wounds, corns, and calluses  Use a mirror to see the bottom of your feet  The skin on your feet may be shiny, tight, or darker than normal  Your feet may also be cold and pale  Feel your feet by running your hands along the tops, bottoms, sides, and between your toes  Redness, swelling, and warmth are signs of blood flow problems that can lead to a foot ulcer  Do not try to remove corns or calluses yourself  Do not ignore small problems, such as dry skin or small wounds  These can become life-threatening over time without proper care  • Wash your feet each day with soap and warm water  Do not use hot water, because this can injure your foot  Dry your feet gently with a towel after you wash them  Dry between and under your toes  • Apply lotion or a moisturizer on your dry feet  Ask your care team provider what lotions are best to use  Do not put lotion or moisturizer between your toes  Moisture between your toes could lead to skin breakdown  • Cut your toenails correctly  File or cut your toenails straight across  Use a soft brush to clean around your toenails   If your toenails are very thick, you may need to have a care team provider or specialist cut them  • Protect your feet  Do not walk barefoot or wear your shoes without socks  Check your shoes for rocks or other objects that can hurt your feet  Wear cotton socks to help keep your feet dry  Wear socks without toe seams, or wear them with the seams inside out  Change your socks each day  Do not wear socks that are dirty or damp  • Wear shoes that fit well  Wear shoes that do not rub against any area of your feet  Your shoes should be ½ to ¾ inch (1 to 2 centimeters) longer than your feet  Your shoes should also have extra space around the widest part of your feet  Walking or athletic shoes with laces or straps that adjust are best  Ask your care team provider for help to choose shoes that fit you best  Ask your provider if you need to wear an insert, orthotic, or bandage on your feet  • Go to your follow-up visits  Your care team provider will do a foot exam at least 1 time each year  You may need a foot exam more often if you have nerve damage, foot deformities, or ulcers  Your provider will check for nerve damage and how well you can feel your feet  Your provider will check your shoes to see if they fit well  • Do not smoke  Smoking can damage your blood vessels and put you at increased risk for foot ulcers  Ask your care team provider for information if you currently smoke and need help to quit  E-cigarettes or smokeless tobacco still contain nicotine  Talk to your care team provider before you use these products  Follow up with your diabetes care team provider or foot specialist as directed: You will need to have your feet checked at least 1 time each year  You may need a foot exam more often if you have nerve damage, foot deformities, or ulcers  Write down your questions so you remember to ask them during your visits    © Copyright [employer] 2022 Information is for End User's use only and may not be sold, redistributed or otherwise used for commercial purposes  The above information is an  only  It is not intended as medical advice for individual conditions or treatments  Talk to your doctor, nurse or pharmacist before following any medical regimen to see if it is safe and effective for you  What to Do if Your Blood Sugar is Low   AMBULATORY CARE:   Low blood sugar levels  (hypoglycemia) can happen with Type 1 and Type 2 diabetes  Low levels are more likely to happen if you use insulin  Hypoglycemia can cause you to have falls, accidents, and injuries  A blood sugar level that gets too low can lead to seizures, coma, and death  Learn to recognize the symptoms early so you can get treatment quickly  When your blood sugar is low you may feel:  • Sweaty    • Nervous or shaky    • Anxious or irritable    • Confused    • A fast, pounding heartbeat    • Extremely hungry    Have someone call your local emergency number (911 in the [address] [facility]) if:   • You cannot be woken  • You have a seizure  Call your doctor if:   • You have symptoms of a low blood sugar level, such as trouble thinking, sweating, or a pounding heartbeat  • Your blood sugar level is lower than normal and it does not improve with treatment  • You often have lower blood sugar levels than your target goals  • You have trouble coping with your illness, or you feel anxious or depressed  • You have questions or concerns about your condition or care  What to do if you have symptoms of low blood sugar:   • Check your blood sugar level, if possible  Your blood sugar level is too low if it is at or below 70 mg/dL  • Eat or drink 15 grams of fast-acting carbohydrate  Fast-acting carbohydrates will raise your blood sugar level quickly  Examples of 15 grams of fast-acting carbohydrates:     ? 4 ounces (½ cup) of fruit juice     ? 4 ounces of regular soda    ? 2 tablespoons of raisins     ?  1 tube of glucose gel or 3 to 4 glucose tablets       • Check your blood sugar level 15 minutes later  If the level is still low (less than 100 mg/dL), eat another 15 grams of carbohydrate  When the level returns to 100 mg/dL, eat a snack or meal that contains carbohydrates  This will help prevent another drop in blood sugar  • Teach people close to you how to use your glucagon kit  Your blood sugar may be too low for you to be awake  People need to know when and how to use your kit  Prevent low blood sugar levels:  Prevent low blood sugar by knowing what increases your risk  Ask your healthcare provider for ways to prevent low blood sugar levels  Any of the following can increase your risk of low blood sugar:  • Fasting for tests or procedures    • During or after intense exercise    • Late or postponed meals    • Sleeping (you may need a bedtime snack)     • Drinking alcohol if you use insulin or insulin releasing pills    Follow up with your doctor as directed:  Write down your questions so you remember to ask them during your visits  © Copyright [employer] 2022 Information is for End User's use only and may not be sold, redistributed or otherwise used for commercial purposes  The above information is an  only  It is not intended as medical advice for individual conditions or treatments  Talk to your doctor, nurse or pharmacist before following any medical regimen to see if it is safe and effective for you  Type 2 Diabetes Management for Adults   AMBULATORY CARE:   Type 2 diabetes  is a disease that affects how your body uses glucose (sugar)  Either your body cannot make enough insulin, or it cannot use the insulin correctly  It is important to keep diabetes controlled to prevent damage to your heart, blood vessels, and other organs  Management will help you feel well and enjoy your daily activities  Your diabetes care team providers can help you make a plan to fit diabetes care into your schedule   Your plan can change over time to fit your needs and your family's needs  Have someone call your local emergency number (911 in the [address] [facility]) if:   • You cannot be woken  • You have signs of diabetic ketoacidosis:     ? confusion, fatigue    ? vomiting    ? rapid heartbeat    ? fruity smelling breath    ? extreme thirst    ? dry mouth and skin    • You have any of the following signs of a heart attack:      ? Squeezing, pressure, or pain in your chest    ? You may  also have any of the following:     - Discomfort or pain in your back, neck, jaw, stomach, or arm    - Shortness of breath    - Nausea or vomiting    - Lightheadedness or a sudden cold sweat    • You have any of the following signs of a stroke:      ? Numbness or drooping on one side of your face     ? Weakness in an arm or leg    ? Confusion or difficulty speaking    ? Dizziness, a severe headache, or vision loss    Call your doctor or diabetes care team provider if:   • You have a sore or wound that will not heal     • You have a change in the amount you urinate  • Your blood sugar levels are higher than your target goals  • You often have lower blood sugar levels than your target goals  • Your skin is red, dry, warm, or swollen  • You have trouble coping with diabetes, or you feel anxious or depressed  • You have questions or concerns about your condition or care  What you need to know about high blood sugar levels:  High blood sugar levels may not cause any symptoms  You may feel more thirsty or urinate more often than usual  Over time, high blood sugar levels can damage your nerves, blood vessels, tissues, and organs   The following can increase your blood sugar levels:  • Large meals or large amounts of carbohydrates at one time    • Less physical activity    • Stress    • Illness    • A lower dose of diabetes medicine or insulin, or a late dose    What you need to know about low blood sugar levels:  Symptoms include feeling shaky, dizzy, irritable, or confused  You can prevent symptoms by keeping your blood sugar levels from going too low  • Treat a low blood sugar level right away:      ? Drink 4 ounces of juice or have 1 tube of glucose gel  ? Check your blood sugar level again 10 to 15 minutes later  ? When the level goes back to normal, eat a meal or snack to prevent another decrease  • Keep glucose gel, raisins, or hard candy with you at all times to treat a low blood sugar level  • Your blood sugar level can get too low if you take diabetes medicine or insulin and do not eat enough food  • If you use insulin, check your blood sugar level before you exercise  ? If your blood sugar level is below 100 mg/dL, eat 4 crackers or 2 ounces of raisins, or drink 4 ounces of juice  ? Check your level every 30 minutes if you exercise longer than 1 hour  ? You may need a snack during or after exercise  What you can do to manage your blood sugar levels:   • Check your blood sugar levels as directed and as needed  Several items are available to use to check your levels  You may need to check by testing a drop of blood in a glucose monitor  You may instead be given a continuous glucose monitoring (CGM) device  The device is worn at all times  The CGM checks your blood sugar level every 5 minutes  It sends results to an electronic device such as a smart phone  A CGM can be used with or without an insulin pump  You and your diabetes care team providers will decide on the best method for you  The goal for blood sugar levels before meals  is between 80 and 130 mg/dL and 2 hours after eating  is lower than 180 mg/dL  • Make healthy food choices  Work with a dietitian to develop a meal plan that works for you and your schedule  A dietitian can help you learn how to eat the right amount of carbohydrates during your meals and snacks  Carbohydrates can raise your blood sugar level if you eat too many at one time   Examples of foods that contain carbohydrates are breads, cereals, rice, pasta, and sweets  • Eat high-fiber foods as directed  Fiber helps improve blood sugar levels  Fiber also lowers your risk for heart disease and other problems diabetes can cause  Examples of high-fiber foods include vegetables, whole-grain bread, and beans such as colmenares beans  Your dietitian can tell you how much fiber to have each day  • Get regular physical activity  Physical activity can help you get to your target blood sugar level goal and manage your weight  Get at least 150 minutes of moderate to vigorous aerobic physical activity each week  Do not miss more than 2 days in a row  Do not sit longer than 30 minutes at a time  Your healthcare provider can help you create an activity plan  The plan can include the best activities for you and can help you build your strength and endurance  • Maintain a healthy weight  Ask your team what a healthy weight is for you  A healthy weight can help you control diabetes and prevent heart disease  Ask your team to help you create a weight loss plan, if needed  Weight loss can help make a difference in managing diabetes  Your team will help you set a weight-loss goal, such as 10 to 15 pounds, or 5% of your extra weight  Together you and your team can set manageable weight loss goals  • Take your diabetes medicine or insulin as directed  You may need diabetes medicine, insulin, or both to help control your blood sugar levels  Your healthcare provider will teach you how and when to take your diabetes medicine or insulin  You will also be taught about side effects oral diabetes medicine can cause  Insulin may be injected or given through a pump or pen  You and your providers will decide on the best method for you:    ? An insulin pump  is an implanted device that gives your insulin 24 hours a day  An insulin pump prevents the need for multiple insulin injections in a day  ?  An insulin pen  is a device prefilled with the right amount of insulin  ? You and your family members will be taught how to draw up and give insulin  if this is the best method for you  Your providers will also teach you how to dispose of needles and syringes  ? You will learn how much insulin you need  and when to give it  You will be taught when not to give insulin  You will also be taught what to do if your blood sugar level drops too low  This may happen if you take insulin and do not eat the right amount of carbohydrates  More ways to manage type 2 diabetes:   • Wear medical alert identification  Wear medical alert jewelry or carry a card that says you have diabetes  Ask your provider where to get these items  • Do not smoke  Nicotine and other chemicals in cigarettes and cigars can cause lung and blood vessel damage  It also makes it more difficult to manage your diabetes  Ask your provider for information if you currently smoke and need help to quit  Do not use e-cigarettes or smokeless tobacco in place of cigarettes or to help you quit  They still contain nicotine  • Check your feet each day for cuts, scratches, calluses, or other wounds  Look for redness and swelling, and feel for warmth  Wear shoes that fit well  Check your shoes for rocks or other objects that can hurt your feet  Do not walk barefoot or wear shoes without socks  Wear cotton socks to help keep your feet dry  • Ask about vaccines you may need  You have a higher risk for serious illness if you get the flu, pneumonia, COVID-19, or hepatitis  Ask your provider if you should get vaccines to prevent these or other diseases, and when to get the vaccines  • Talk to your provider if you become stressed about diabetes care  Sometimes being able to fit diabetes care into your life can cause increased stress  The stress can cause you not to take care of yourself properly   Your care team providers can help by offering tips about self-care  Your providers may suggest you talk to a mental health provider who can listen and offer help with self-care issues  • Have your A1c checked as directed  Your provider may check your A1c every 3 months, or 2 times each year if your diabetes is controlled  An A1c test shows the average amount of sugar in your blood over the past 2 to 3 months  Your provider will tell you what your A1c level should be  • Have screening tests as directed  Your provider may recommend screening for complications of diabetes and other conditions that may develop  Some screenings may begin right away and some may happen within the first 5 years of diagnosis:    ? Examples of diabetes complications  include kidney problems, high cholesterol, high blood pressure, blood vessel problems, eye problems, and sleep apnea  ? You may be screened for a low vitamin B level  if you take oral diabetes medicine for a long time  ? Women of childbearing years may be screened  for polycystic ovarian syndrome (PCOS)  Follow up with your doctor or diabetes care team providers as directed: You may need to have blood tests done before your follow-up visit  The test results will show if changes need to be made in your treatment or self-care  Talk to your provider if you cannot afford your medicine  Write down your questions so you remember to ask them during your visits  © Copyright [employer] 2022 Information is for End User's use only and may not be sold, redistributed or otherwise used for commercial purposes  The above information is an  only  It is not intended as medical advice for individual conditions or treatments  Talk to your doctor, nurse or pharmacist before following any medical regimen to see if it is safe and effective for you  Basic Carbohydrate Counting   AMBULATORY CARE:   Carbohydrate counting  is a way to plan your meals by counting the amount of carbohydrate in foods  Carbohydrates are the sugars, starches, and fiber found in fruit, grains, vegetables, and milk products  Carbohydrates increase your blood sugar levels  Carbohydrate counting can help you eat the right amount of carbohydrate to keep your blood sugar levels under control  What you need to know about planning meals using carbohydrate counting:  • A dietitian or healthcare provider will help you develop a healthy meal plan that works best for you  You will be taught how much carbohydrate to eat or drink for each meal and snack  Your meal plan will be based on your age, weight, usual food intake, and physical activity level  If you have diabetes, it will also include your blood sugar levels and diabetes medicine  Once you know how much carbohydrate you should eat, you can decide what type of food you want to eat  • You will need to know what foods contain carbohydrate and how much they contain  Keep track of the amount of carbohydrate in meals and snacks in order to follow your meal plan  Do not avoid carbohydrates or skip meals  Your blood sugar may fall too low if you do not eat enough carbohydrate or you skip meals  Foods that contain carbohydrate:   • Breads:  Each serving of food listed below contains about 15 g of carbohydrate   ? 1 slice of bread (1 ounce) or 1 flour or corn tortilla (6 inch)    ? ½ of a hamburger bun or ¼ of a large bagel (about 1 ounce)    ? 1 pancake (about 4 inches across and ¼ inch thick)    • Cereals and grains:  Serving sizes of ready-to-eat cereals vary  Look at the serving size and the total carbohydrate amount listed on the food label  Each serving of food listed below contains about 15 g of carbohydrate   ? ¾ cup of dry, unsweetened, ready-to-eat cereal or ¼ cup of low-fat granola     ?  ½ cup of oatmeal or other cooked cereal     ? ? cup of cooked rice or pasta    • Starchy vegetables and beans:  Each serving of food listed below contains about 15 g of carbohydrate      ? ½ cup of corn, green peas, sweet potatoes, or mashed potatoes    ? ¼ of a large baked potato    ? ½ cup of beans, lentils, and peas (garbanzo, colmenares, kidney, white, split, black-eyed)    • Crackers and snacks:  Each serving of food listed below contains about 15 g of carbohydrate   ? 3 dennis cracker squares or 8 animal crackers     ? 6 saltine-type crackers    ? 3 cups of popcorn or ¾ ounce of pretzels, potato chips, or tortilla chips    • Fruit:  Each serving of food listed below contains about 15 g of carbohydrate   ? 1 small (4 ounce) piece of fresh fruit or ¾ to 1 cup of fresh fruit    ? ½ cup of canned or frozen fruit, packed in natural juice    ? ½ cup (4 ounces) of unsweetened fruit juice    ? 2 tablespoons of dried fruit    • Desserts or sugary foods:  Each serving of food listed below contains about 15 g of carbohydrate   ? 2-inch square unfrosted cake or brownie     ? 2 small cookies    ? ½ cup of ice cream, frozen yogurt, or nondairy frozen yogurt    ? ¼ cup of sherbet or sorbet    ? 1 tablespoon of regular syrup, jam, or jelly    ? 2 tablespoons of light syrup    • Milk and yogurt:  Foods from the milk group contain about 12 g of carbohydrate per serving  ? 1 cup of fat-free or low-fat milk    ? 1 cup of soy milk    ? ? cup of fat-free, yogurt sweetened with artificial sweetener    • Non-starchy vegetables:  Each serving contains about 5 g of carbohydrate   Three servings of non-starch vegetables count as 1 carbohydrate serving  ? ½ cup of cooked vegetables or 1 cup of raw vegetables  This includes beets, broccoli, cabbage, cauliflower, cucumber, mushrooms, tomatoes, and zucchini    ? ½ cup of vegetable juice    How to use carbohydrate counting to plan meals:   • Count carbohydrate amounts using serving sizes:      ? Pasta dinner example: You plan to have pasta, tossed salad, and an 8-ounce glass of milk   Your healthcare provider tells you that you may have 4 carbohydrate servings for dinner  One carbohydrate serving of pasta is ? cup  One cup of pasta will equal 3 carbohydrate servings  An 8-ounce glass of milk will count as 1 carbohydrate serving  These amounts of food would equal 4 carbohydrate servings  One cup of tossed salad does not count toward your carbohydrate servings  • Count carbohydrate amounts using food labels:  Find the total amount of carbohydrate in a packaged food by reading the food label  Food labels tell you the serving size of the food and the total carbohydrate amount in each serving  Find the serving size on the food label and then decide how many servings you will eat  Multiply the number of servings you plan to eat by the carbohydrate amount per serving  ? Granola bar snack example: Your meal plan allows you to have 2 carbohydrate servings (30 grams) of carbohydrate for a snack  You plan to eat 1 package of granola bars, which contains 2 bars  According to the food label, the serving size of food in this package is 1 bar  Each serving (1 bar) contains 25 grams of carbohydrate  The total amount of carbohydrate in this package of granola bars would be 50 g  Based on your meal plan, you should eat only 1 bar  Follow up with your doctor as directed:  Write down your questions so you remember to ask them during your visits  © Copyright [employer] 2022 Information is for End User's use only and may not be sold, redistributed or otherwise used for commercial purposes  The above information is an  only  It is not intended as medical advice for individual conditions or treatments  Talk to your doctor, nurse or pharmacist before following any medical regimen to see if it is safe and effective for you  Foot Care for People with Diabetes   AMBULATORY CARE:   What you need to know about foot care:  Long-term high blood sugar levels can damage the blood vessels and nerves in your legs and feet   This damage makes it hard to feel pressure, pain, temperature, and touch  You may not be able to feel a cut or sore, or shoes that are too tight  Foot care is needed to prevent serious problems, such as an infection or amputation  Diabetes may cause your toes to become crooked or curved under  These changes may affect the way you walk and can lead to increased pressure on your foot  The pressure can decrease blood flow to your feet  Lack of blood flow increases your risk for a foot ulcer  Call your care team provider if:   • Your feet become numb, weak, or hard to move  • You have pus draining from a sore on your foot  • You have a wound on your foot that gets bigger, deeper, or does not heal     • You see blisters, cuts, scratches, calluses, or sores on your foot  • You have a fever, and your feet become red, warm, and swollen  • Your toenails become thick, curled, or yellow  • You find it hard to check your feet because your vision is poor  • You have questions or concerns about your condition or care  How to care for your feet:   • Check your feet each day  Look at your whole foot, including the bottom, and between and under your toes  Check for wounds, corns, and calluses  Use a mirror to see the bottom of your feet  The skin on your feet may be shiny, tight, or darker than normal  Your feet may also be cold and pale  Feel your feet by running your hands along the tops, bottoms, sides, and between your toes  Redness, swelling, and warmth are signs of blood flow problems that can lead to a foot ulcer  Do not try to remove corns or calluses yourself  Do not ignore small problems, such as dry skin or small wounds  These can become life-threatening over time without proper care  • Wash your feet each day with soap and warm water  Do not use hot water, because this can injure your foot  Dry your feet gently with a towel after you wash them  Dry between and under your toes      • Apply lotion or a moisturizer on your dry feet   Ask your care team provider what lotions are best to use  Do not put lotion or moisturizer between your toes  Moisture between your toes could lead to skin breakdown  • Cut your toenails correctly  File or cut your toenails straight across  Use a soft brush to clean around your toenails  If your toenails are very thick, you may need to have a care team provider or specialist cut them  • Protect your feet  Do not walk barefoot or wear your shoes without socks  Check your shoes for rocks or other objects that can hurt your feet  Wear cotton socks to help keep your feet dry  Wear socks without toe seams, or wear them with the seams inside out  Change your socks each day  Do not wear socks that are dirty or damp  • Wear shoes that fit well  Wear shoes that do not rub against any area of your feet  Your shoes should be ½ to ¾ inch (1 to 2 centimeters) longer than your feet  Your shoes should also have extra space around the widest part of your feet  Walking or athletic shoes with laces or straps that adjust are best  Ask your care team provider for help to choose shoes that fit you best  Ask your provider if you need to wear an insert, orthotic, or bandage on your feet  • Go to your follow-up visits  Your care team provider will do a foot exam at least 1 time each year  You may need a foot exam more often if you have nerve damage, foot deformities, or ulcers  Your provider will check for nerve damage and how well you can feel your feet  Your provider will check your shoes to see if they fit well  • Do not smoke  Smoking can damage your blood vessels and put you at increased risk for foot ulcers  Ask your care team provider for information if you currently smoke and need help to quit  E-cigarettes or smokeless tobacco still contain nicotine  Talk to your care team provider before you use these products  Follow up with your diabetes care team provider or foot specialist as directed:   You will need to have your feet checked at least 1 time each year  You may need a foot exam more often if you have nerve damage, foot deformities, or ulcers  Write down your questions so you remember to ask them during your visits  © Copyright [employer] 2022 Information is for End User's use only and may not be sold, redistributed or otherwise used for commercial purposes  The above information is an  only  It is not intended as medical advice for individual conditions or treatments  Talk to your doctor, nurse or pharmacist before following any medical regimen to see if it is safe and effective for you  What to Do if Your Blood Sugar is Low   AMBULATORY CARE:   Low blood sugar levels  (hypoglycemia) can happen with Type 1 and Type 2 diabetes  Low levels are more likely to happen if you use insulin  Hypoglycemia can cause you to have falls, accidents, and injuries  A blood sugar level that gets too low can lead to seizures, coma, and death  Learn to recognize the symptoms early so you can get treatment quickly  When your blood sugar is low you may feel:  • Sweaty    • Nervous or shaky    • Anxious or irritable    • Confused    • A fast, pounding heartbeat    • Extremely hungry    Have someone call your local emergency number (911 in the [address] [facility]) if:   • You cannot be woken  • You have a seizure  Call your doctor if:   • You have symptoms of a low blood sugar level, such as trouble thinking, sweating, or a pounding heartbeat  • Your blood sugar level is lower than normal and it does not improve with treatment  • You often have lower blood sugar levels than your target goals  • You have trouble coping with your illness, or you feel anxious or depressed  • You have questions or concerns about your condition or care  What to do if you have symptoms of low blood sugar:   • Check your blood sugar level, if possible  Your blood sugar level is too low if it is at or below 70 mg/dL  • Eat or drink 15 grams of fast-acting carbohydrate  Fast-acting carbohydrates will raise your blood sugar level quickly  Examples of 15 grams of fast-acting carbohydrates:     ? 4 ounces (½ cup) of fruit juice     ? 4 ounces of regular soda    ? 2 tablespoons of raisins     ? 1 tube of glucose gel or 3 to 4 glucose tablets       • Check your blood sugar level 15 minutes later  If the level is still low (less than 100 mg/dL), eat another 15 grams of carbohydrate  When the level returns to 100 mg/dL, eat a snack or meal that contains carbohydrates  This will help prevent another drop in blood sugar  • Teach people close to you how to use your glucagon kit  Your blood sugar may be too low for you to be awake  People need to know when and how to use your kit  Prevent low blood sugar levels:  Prevent low blood sugar by knowing what increases your risk  Ask your healthcare provider for ways to prevent low blood sugar levels  Any of the following can increase your risk of low blood sugar:  • Fasting for tests or procedures    • During or after intense exercise    • Late or postponed meals    • Sleeping (you may need a bedtime snack)     • Drinking alcohol if you use insulin or insulin releasing pills    Follow up with your doctor as directed:  Write down your questions so you remember to ask them during your visits  © Copyright [employer] 2022 Information is for End User's use only and may not be sold, redistributed or otherwise used for commercial purposes  The above information is an  only  It is not intended as medical advice for individual conditions or treatments  Talk to your doctor, nurse or pharmacist before following any medical regimen to see if it is safe and effective for you

## 2023-04-26 NOTE — PROGRESS NOTES
BMI Counseling: Body mass index is 35 73 kg/m²  The BMI is above normal  Nutrition recommendations include decreasing portion sizes, encouraging healthy choices of fruits and vegetables, decreasing fast food intake, consuming healthier snacks, limiting drinks that contain sugar, moderation in carbohydrate intake, increasing intake of lean protein, reducing intake of saturated and trans fat and reducing intake of cholesterol  Exercise recommendations include vigorous physical activity 75 minutes/week, exercising 3-5 times per week, obtaining a gym membership and strength training exercises  No pharmacotherapy was ordered  Rationale for BMI follow-up plan is due to patient being overweight or obese  Depression Screening and Follow-up Plan: Patient was screened for depression during today's encounter  They screened negative with a PHQ-2 score of 0  Falls Plan of Care: balance, strength, and gait training instructions were provided  Medications that increase falls were reviewed  Vitamin D supplementation was recommended  Home safety education provided  Assessment/Plan:         Problem List Items Addressed This Visit        Endocrine    Diabetes mellitus type 2 in obese [facility]) - Primary         Subjective:      Patient ID: Tanja Ro is a 82 y o  male  Patient is a 82year old male present to establish care and noted to have history of hypertension, hyperlipidemia, vascular insufficiency, diabetes, stage IV kidney disease, edema and obesity         The following portions of the patient's history were reviewed and updated as appropriate:   Past Medical History:  He has a past medical history of BPH (benign prostatic hyperplasia), Diabetes mellitus ([address] ), Diverticulosis, History of cardiac cath (2005), Hyperglycemia, Hyperlipidemia, Hypertension, Kidney stone, Macular degeneration, Obesity, LEONORA on CPAP, SBO (small bowel obstruction) ([address] ), and Swollen ankles  ,  _______________________________________________________________________  Medical Problems:  does not have any pertinent problems on file ,  _______________________________________________________________________  Past Surgical History:   has a past surgical history that includes Tonsillectomy; Hernia repair; Retinal detachment surgery; and Colonoscopy (2007)  ,  _______________________________________________________________________  Family History:  family history includes COPD in his mother; Emphysema in his mother; Lung cancer in his father ,  _______________________________________________________________________  Social History:   reports that he has never smoked  He has never used smokeless tobacco  He reports that he does not drink alcohol and does not use drugs  ,  _______________________________________________________________________  Allergies:  has No Known Allergies     _______________________________________________________________________  Current Outpatient Medications   Medication Sig Dispense Refill   • aspirin 325 mg tablet Take 325 mg by mouth daily     • Biotin 5 MG TABS Take by mouth daily     • carvedilol (COREG) 12 5 mg tablet Take 1 tablet (12 5 mg total) by mouth 2 (two) times a day 60 tablet 0   • cholecalciferol (VITAMIN D3) 1,000 units tablet Take 1,000 Units by mouth 2 (two) times a day     • Choline Fenofibrate (Fenofibric Acid) 135 MG CPDR TAKE ONE CAPSULE BY MOUTH EVERY DAY 30 capsule 12   • cloNIDine (CATAPRES-TTS-3) 0 3 mg/24 hr PLACE ONE PATCH (0 3MG TOTAL) ON THE SKIN ONCE A WEEK 4 patch 0   • co-enzyme Q-10 30 MG capsule Take 100 mg by mouth daily      • GLUCOSAMINE-CHONDROITIN PO Take 2 tablets by mouth daily  (Patient not taking: Reported on 12/22/2022)     • losartan (COZAAR) 25 mg tablet Take 1 tablet (25 mg total) by mouth daily 90 tablet 1   • LUTEIN-ZEAXANTHIN-BILBERRY PO Take by mouth 2 (two) times a day     • milk thistle 175 MG tablet Take 175 mg by mouth daily • Misc Natural Products (OSTEO BI-FLEX TRIPLE STRENGTH PO) Take 1 tablet by mouth 2 (two) times a day     • multivitamin (THERAGRAN) TABS Take 1 tablet by mouth daily     • Omega-3 Fatty Acids (FISH OIL) 1,000 mg Take 1,000 mg by mouth daily     • pravastatin (PRAVACHOL) 40 mg tablet TAKE ONE TABLET BY MOUTH EVERY DAY 90 tablet 0   • spironolactone (ALDACTONE) 25 mg tablet TAKE ONE TABLET BY MOUTH EVERY OTHER DAY 45 tablet 3   • Taurine 500 MG CAPS Take by mouth daily     • thiamine (VITAMIN B1) 100 mg tablet Take 100 mg by mouth daily     • torsemide (DEMADEX) 10 mg tablet Take 1 5 tablets (15 mg total) by mouth daily 135 tablet 1   • Tradjenta 5 MG TABS Take 5 mg by mouth in the morning  30 tablet 0   • TURMERIC PO Take by mouth       • vitamin E 100 UNIT capsule Take 100 Units by mouth daily         No current facility-administered medications for this visit      _______________________________________________________________________  Review of Systems   Constitutional: Negative for activity change, appetite change, chills, fatigue, fever and unexpected weight change  HENT: Negative for congestion, ear discharge, ear pain, nosebleeds, postnasal drip, rhinorrhea, sinus pressure, sinus pain, sneezing, sore throat and voice change  Eyes: Negative for pain, redness and visual disturbance  Respiratory: Negative for cough, chest tightness, shortness of breath and wheezing  Cardiovascular: Positive for leg swelling  Negative for chest pain and palpitations  Gastrointestinal: Negative for abdominal distention, abdominal pain, constipation, diarrhea, nausea and vomiting  Endocrine: Negative  Genitourinary: Negative for difficulty urinating, dysuria, flank pain, frequency, hematuria and urgency  Musculoskeletal: Negative for arthralgias and myalgias  Skin: Negative  Allergic/Immunologic: Negative  Neurological: Negative  Hematological: Negative  Psychiatric/Behavioral: Negative  Objective: There were no vitals filed for this visit  There is no height or weight on file to calculate BMI  Physical Exam  Vitals and nursing note reviewed  Constitutional:       Appearance: Normal appearance  He is well-developed  He is obese  HENT:      Head: Normocephalic and atraumatic  Right Ear: Tympanic membrane, ear canal and external ear normal       Left Ear: Tympanic membrane, ear canal and external ear normal       Nose: Nose normal  No congestion or rhinorrhea  Mouth/Throat:      Mouth: Mucous membranes are moist       Pharynx: No oropharyngeal exudate or posterior oropharyngeal erythema  Eyes:      Extraocular Movements: Extraocular movements intact  Conjunctiva/sclera: Conjunctivae normal       Pupils: Pupils are equal, round, and reactive to light  Cardiovascular:      Rate and Rhythm: Normal rate and regular rhythm  Pulses: Normal pulses  Heart sounds: Normal heart sounds  No murmur heard  Pulmonary:      Effort: Pulmonary effort is normal       Breath sounds: Normal breath sounds  Abdominal:      General: Bowel sounds are normal       Palpations: Abdomen is soft  Musculoskeletal:         General: Normal range of motion  Cervical back: Normal range of motion  Right lower leg: Edema present  Left lower leg: Edema present  Skin:     General: Skin is warm  Capillary Refill: Capillary refill takes less than 2 seconds  Neurological:      General: No focal deficit present  Mental Status: He is alert and oriented to person, place, and time     Psychiatric:         Mood and Affect: Mood normal          Behavior: Behavior normal

## 2023-04-27 ENCOUNTER — OFFICE VISIT (OUTPATIENT)
Dept: FAMILY MEDICINE CLINIC | Facility: CLINIC | Age: 83
End: 2023-04-27

## 2023-04-27 VITALS
HEIGHT: 70 IN | HEART RATE: 73 BPM | WEIGHT: 249 LBS | OXYGEN SATURATION: 95 % | BODY MASS INDEX: 35.65 KG/M2 | DIASTOLIC BLOOD PRESSURE: 76 MMHG | SYSTOLIC BLOOD PRESSURE: 130 MMHG | TEMPERATURE: 97.1 F | RESPIRATION RATE: 16 BRPM

## 2023-04-27 DIAGNOSIS — E66.9 DIABETES MELLITUS TYPE 2 IN OBESE (HCC): ICD-10-CM

## 2023-04-27 DIAGNOSIS — E11.69 DIABETES MELLITUS TYPE 2 IN OBESE (HCC): ICD-10-CM

## 2023-04-27 DIAGNOSIS — L53.1 ERYTHEMA ANNULARE: ICD-10-CM

## 2023-04-27 DIAGNOSIS — I12.9 BENIGN HYPERTENSION WITH CHRONIC KIDNEY DISEASE, STAGE IV (HCC): ICD-10-CM

## 2023-04-27 DIAGNOSIS — N18.4 BENIGN HYPERTENSION WITH CHRONIC KIDNEY DISEASE, STAGE IV (HCC): ICD-10-CM

## 2023-04-27 DIAGNOSIS — E09.22: ICD-10-CM

## 2023-04-27 DIAGNOSIS — E66.01 SEVERE OBESITY WITH BODY MASS INDEX (BMI) OF 35.0 TO 35.9 AND COMORBIDITY (HCC): ICD-10-CM

## 2023-04-27 DIAGNOSIS — Z13.820 SCREENING FOR OSTEOPOROSIS: ICD-10-CM

## 2023-04-27 DIAGNOSIS — N18.4: ICD-10-CM

## 2023-04-27 DIAGNOSIS — R60.0 LOWER EXTREMITY EDEMA: ICD-10-CM

## 2023-04-27 DIAGNOSIS — Z00.8 EXAM FOR POPULATION SURVEY: ICD-10-CM

## 2023-04-27 DIAGNOSIS — E78.2 MIXED HYPERLIPIDEMIA: ICD-10-CM

## 2023-04-27 DIAGNOSIS — Z13.89 SCREENING FOR BLOOD OR PROTEIN IN URINE: ICD-10-CM

## 2023-04-27 DIAGNOSIS — Z00.00 MEDICARE ANNUAL WELLNESS VISIT, SUBSEQUENT: Primary | ICD-10-CM

## 2023-04-27 LAB — SL AMB POCT HEMOGLOBIN AIC: 7.2 (ref ?–6.5)

## 2023-04-27 NOTE — ASSESSMENT & PLAN NOTE
Prior hemoglobin A1c 7 0  Current hemoglobin A1c 7 2 slightly elevated  Patient currently maintained on Tradjenta 5 mg p o  daily  Referral to endocrinology for evaluation due to patients long standing kidney disease, medical management may worsen his renal function with oral medications    Lab Results   Component Value Date    HGBA1C 7 0 (H) 10/25/2022

## 2023-04-27 NOTE — PROGRESS NOTES
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 [facility])     Patient followed by nephrology for kidney disease  Patient referred to endocrinology at this time for evaluation of diabetic medications due to his stage IV kidney disease  Lab Results   Component Value Date    HGBA1C 7 0 (H) 10/25/2022            Relevant Orders    Ambulatory Referral to Endocrinology    Diabetes mellitus type 2 in obese [facility])     Prior hemoglobin A1c 7 0  Current hemoglobin A1c 7 2 slightly elevated  Patient currently maintained on Tradjenta 5 mg p o  daily  Referral to endocrinology for evaluation due to patients long standing kidney disease, medical management may worsen his renal function with oral medications  Lab Results   Component Value Date    HGBA1C 7 0 (H) 10/25/2022            Relevant Orders    Urine Microalbumin/creatinine ratio    POCT hemoglobin A1c    Ambulatory Referral to Endocrinology       Cardiovascular and Mediastinum    Benign hypertension with chronic kidney disease, stage IV [facility])     Lab Results   Component Value Date    EGFR 25 01/11/2023    EGFR 31 12/12/2022    EGFR 30 10/25/2022    CREATININE 2 36 (H) 01/11/2023    CREATININE 1 94 (H) 12/12/2022    CREATININE 2 01 (H) 10/25/2022   BP currently 130/76  Patient currently maintained on losartan 25 mg p o  daily percent 15 mg p o  daily, carvedilol 12 5 mg p o  twice daily, clonidine 0 3 mg patch and spironolactone 25 mg every other day  Patient maintain a low-sodium diet  Exercise encouraged 3-5 times per week for at least 75 minutes of cardiovascular activity  Recheck BP next office visit goal BP below 130/70  Musculoskeletal and Integument    Erythema annulare     Follow-up with dermatology for evaluation           Relevant Orders    Ambulatory Referral to Dermatology       Other    Screening for osteoporosis - Primary     Medicare annual physical subsequent completed  Relevant Orders    DXA bone density spine hip and pelvis    Severe obesity with body mass index (BMI) of 35 0 to 35 9 and comorbidity (HCC)     BMI currently 35 73 kg/M2  Goal BMI 25 kg/M2  SL PG inhibitors discussed with patient at this time  Patient with like to discuss with endocrinology due to side effects of medication  Relevant Orders    Ambulatory Referral to Endocrinology    Mixed hyperlipidemia     Prior lipid panel reviewed  Repeat lipid panel  Patient maintain a low-cholesterol low-fat diet  Currently maintained on omega-3 fatty acids fish oil 1000 mg p o  daily, fenofibric acid 135 mg daily  Also Pravachol 40 mg p o  daily  Contains abnormal data Lipid panel  Order: [id number]   Status: Final result      Visible to patient: No (inaccessible in [address])      Next appt: 05/23/2023 at 03:00 PM in Nephrology (Dorie Hernández MD)      Dx: Diabetes mellitus type 2 in obese [facility])      2 Result Notes  Component Ref Range & Units 10/25/22 11:24 AM 7/16/21  9:39 AM 6/25/20 12:23 PM   Cholesterol See Comment mg/dL 179  160 R, CM  183 R, CM    Comment: Cholesterol:         Pediatric <18 Years         Desirable          <170 mg/dL       Borderline High    170-199 mg/dL       High               >=200 mg/dL         Adult >=18 Years             Desirable         <200 mg/dL       Borderline High   200-239 mg/dL       High             >239 mg/dL    Triglycerides See Comment mg/dL 124  109 R, CM  113 R, CM    Comment: Triglyceride:      0-9Y            <75mg/dL      10Y-17Y         <90 mg/dL        >=18Y      Normal          <150 mg/dL      Borderline High 150-199 mg/dL      High            200-499 mg/dL        Very High       >499 mg/dL     Specimen collection should occur prior to N-Acetylcysteine or Metamizole administration due to the potential for falsely depressed results     HDL, Direct >=40 mg/dL 46  42 CM  47 CM    Comment: Specimen collection should occur prior to Metamizole administration due to the potential for falsley depressed results  LDL Calculated 0 - 100 mg/dL 108 High   96 CM  113 High  CM    Comment: LDL Cholesterol:     Optimal           <100 mg/dl     Near Optimal      100-129 mg/dl     Above Optimal       Borderline High 130-159 mg/dl       High            160-189 mg/dl       Very High       >189 mg/dl           This screening LDL is a calculated result  It does not have the accuracy of the Direct Measured LDL in the monitoring of patients with hyperlipidemia and/or statin therapy  Direct Measure LDL (XYX253) must be ordered separately in these patients  Non-HDL-Chol (CHOL-HDL) mg/dl 133  118  1                 Relevant Orders    Lipid panel    Exam for population survey     CBC with diff, CMP  Relevant Orders    CBC and differential    Comprehensive metabolic panel    Lower extremity edema    Relevant Orders    Ambulatory Referral to Cardiology     BMI Counseling: Body mass index is 35 73 kg/m²  The BMI is above normal  Nutrition recommendations include decreasing portion sizes, encouraging healthy choices of fruits and vegetables, decreasing fast food intake, consuming healthier snacks, limiting drinks that contain sugar, moderation in carbohydrate intake, increasing intake of lean protein, reducing intake of saturated and trans fat and reducing intake of cholesterol  Exercise recommendations include vigorous physical activity 75 minutes/week, exercising 3-5 times per week, obtaining a gym membership and strength training exercises  No pharmacotherapy was ordered  Rationale for BMI follow-up plan is due to patient being overweight or obese  Depression Screening and Follow-up Plan: Patient was screened for depression during today's encounter  They screened negative with a PHQ-2 score of 0  Falls Plan of Care: balance, strength, and gait training instructions were provided   Medications that increase falls were reviewed  Vitamin D supplementation was recommended  Home safety education provided  Preventive health issues were discussed with patient, and age appropriate screening tests were ordered as noted in patient's After Visit Summary  Personalized health advice and appropriate referrals for health education or preventive services given if needed, as noted in patient's After Visit Summary  History of Present Illness:     Patient presents for a Medicare Wellness Visit    Patient is a 82year old male present for his Medicare Annual Physical       Patient Care Team:  Tenisha Regan as PCP - General (Nurse Practitioner)  Brandan Dhillon MD (Nephrology)     Review of Systems:     Review of Systems   Constitutional: Negative for activity change, appetite change, chills, fatigue, fever and unexpected weight change  HENT: Negative for congestion, ear discharge, ear pain, nosebleeds, postnasal drip, rhinorrhea, sinus pressure, sinus pain, sneezing, sore throat and voice change  Eyes: Negative for pain, redness and visual disturbance  Respiratory: Negative for cough, chest tightness, shortness of breath and wheezing  Cardiovascular: Positive for leg swelling  Negative for chest pain and palpitations  Gastrointestinal: Negative for abdominal distention, abdominal pain, constipation, diarrhea, nausea and vomiting  Endocrine: Negative  Genitourinary: Negative for difficulty urinating, dysuria, flank pain, frequency, hematuria and urgency  Musculoskeletal: Negative for arthralgias and myalgias  Skin: Negative  Allergic/Immunologic: Negative  Neurological: Negative  Hematological: Negative  Psychiatric/Behavioral: Negative           Problem List:     Patient Active Problem List   Diagnosis   • 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 ([facility] [address] )   • Isolated proteinuria   • Benign hypertension with chronic kidney disease, stage IV (HCC)   • Chronic kidney disease-mineral and bone disorder   • Resistant hypertension   • Diabetes mellitus type 2 in obese ([facility])   • Screening for osteoporosis   • Heart murmur   • Severe obesity with body mass index (BMI) of 35 0 to 35 9 and comorbidity ([facility] [address] )   • Mixed hyperlipidemia   • Obesity, morbid ([facility] [address] )   • Exam for population survey   • Erythema annulare   • Lower extremity edema      Past Medical and Surgical History:     Past Medical History:   Diagnosis Date   • BPH (benign prostatic hyperplasia)    • Diabetes mellitus ([facility] [address] )    • Diverticulosis    • History of cardiac cath    • Hyperglycemia    • Hyperlipidemia    • Hypertension    • Kidney stone    • Macular degeneration     RIGHT   • Obesity    • LEONORA on CPAP    • SBO (small bowel obstruction) ([facility])    • Swollen ankles     LE EDEMA/VI     Past Surgical History:   Procedure Laterality Date   • COLONOSCOPY     • HERNIA REPAIR     • RETINAL DETACHMENT SURGERY     • TONSILLECTOMY        Family History:     Family History   Problem Relation Age of Onset   • COPD Mother    • Emphysema Mother    • Lung cancer Father       Social History:     Social History     Socioeconomic History   • Marital status: /Civil Union     Spouse name: None   • Number of children: None   • Years of education: None   • Highest education level: None   Occupational History   • Occupation: RETIRED   Tobacco Use   • Smoking status: Never   • Smokeless tobacco: Never   Vaping Use   • Vaping Use: Never used   Substance and Sexual Activity   • Alcohol use: Never   • Drug use: Never   • Sexual activity: None   Other Topics Concern   • None   Social History Narrative    · Most recent tobacco use screenin2019      · Advance directive:   No        · Caffeine intake: Moderate      · Exercise level:   Occasional      · Live alone or with others:   with others        · Diet:   Regular      · Seat belts used routinely:   Yes      · Smoke alarm in home:    Yes      · General stress level:   Low · Last modified by [id number]   07-, 03:29      Social Determinants of Health     Financial Resource Strain: Low Risk    • Difficulty of Paying Living Expenses: Not hard at all   Food Insecurity: Not on file   Transportation Needs: No Transportation Needs   • Lack of Transportation (Medical): No   • Lack of Transportation (Non-Medical):  No   Physical Activity: Not on file   Stress: Not on file   Social Connections: Not on file   Intimate Partner Violence: Not on file   Housing Stability: Not on file      Medications and Allergies:     Current Outpatient Medications   Medication Sig Dispense Refill   • aspirin 325 mg tablet Take 325 mg by mouth daily     • Biotin 5 MG TABS Take by mouth daily     • carvedilol (COREG) 12 5 mg tablet Take 1 tablet (12 5 mg total) by mouth 2 (two) times a day 60 tablet 0   • cholecalciferol (VITAMIN D3) 1,000 units tablet Take 1,000 Units by mouth 2 (two) times a day     • Choline Fenofibrate (Fenofibric Acid) 135 MG CPDR TAKE ONE CAPSULE BY MOUTH EVERY DAY 30 capsule 12   • cloNIDine (CATAPRES-TTS-3) 0 3 mg/24 hr PLACE ONE PATCH (0 3MG TOTAL) ON THE SKIN ONCE A WEEK 4 patch 0   • co-enzyme Q-10 30 MG capsule Take 100 mg by mouth daily      • losartan (COZAAR) 25 mg tablet Take 1 tablet (25 mg total) by mouth daily 90 tablet 1   • LUTEIN-ZEAXANTHIN-BILBERRY PO Take by mouth 2 (two) times a day     • milk thistle 175 MG tablet Take 175 mg by mouth daily     • Misc Natural Products (OSTEO BI-FLEX TRIPLE STRENGTH PO) Take 1 tablet by mouth 2 (two) times a day     • multivitamin (THERAGRAN) TABS Take 1 tablet by mouth daily     • Omega-3 Fatty Acids (FISH OIL) 1,000 mg Take 1,000 mg by mouth daily     • pravastatin (PRAVACHOL) 40 mg tablet TAKE ONE TABLET BY MOUTH EVERY DAY 90 tablet 0   • spironolactone (ALDACTONE) 25 mg tablet TAKE ONE TABLET BY MOUTH EVERY OTHER DAY 45 tablet 3   • Taurine 500 MG CAPS Take by mouth daily     • thiamine (VITAMIN B1) 100 mg tablet Take 100 mg by mouth daily     • torsemide (DEMADEX) 10 mg tablet Take 1 5 tablets (15 mg total) by mouth daily 135 tablet 1   • Tradjenta 5 MG TABS Take 5 mg by mouth in the morning  30 tablet 0   • TURMERIC PO Take by mouth       • vitamin E 100 UNIT capsule Take 100 Units by mouth daily       • GLUCOSAMINE-CHONDROITIN PO Take 2 tablets by mouth daily  (Patient not taking: Reported on 2022)       No current facility-administered medications for this visit  No Known Allergies   Immunizations:     Immunization History   Administered Date(s) Administered   • COVID-19 J&J (Ermelinda) vaccine 0 5 mL 2021   • COVID-19 PFIZER VACCINE 0 3 ML IM 2021   • INFLUENZA 09/10/2010, 2011, 2012, 2017, 10/25/2018, 10/24/2022   • Influenza, high dose seasonal 0 7 mL 10/19/2021, 10/24/2022   • Pneumococcal Conjugate 13-Valent 2015   • Pneumococcal Polysaccharide PPV23 2007   • Zoster 2009      Health Maintenance: There are no preventive care reminders to display for this patient  Topic Date Due   • COVID-19 Vaccine (3 - Booster for Ermelinda series) 2022      Medicare Screening Tests and Risk Assessments:     Nicholas Vieira is here for his Subsequent Wellness visit  Last Medicare Wellness visit information reviewed, patient interviewed and updates made to the record today  Health Risk Assessment:   Patient rates overall health as very good  Patient feels that their physical health rating is same  Patient is very satisfied with their life  Eyesight was rated as same  Hearing was rated as same  Patient feels that their emotional and mental health rating is same  Patients states they are never, rarely angry  Patient states they are sometimes unusually tired/fatigued  Pain experienced in the last 7 days has been some  Patient's pain rating has been 2/10  Patient states that he has experienced no weight loss or gain in last 6 months       Depression Screening:   PHQ-2 Score: 0      Fall Risk Screening: In the past year, patient has experienced: no history of falling in past year      Home Safety:  Patient does not have trouble with stairs inside or outside of their home  Patient has working smoke alarms and has no working carbon monoxide detector  Home safety hazards include: none  Nutrition:   Current diet is Diabetic  Medications:   Patient is not currently taking any over-the-counter supplements  Patient is able to manage medications  Activities of Daily Living (ADLs)/Instrumental Activities of Daily Living (IADLs):   Walk and transfer into and out of bed and chair?: Yes  Dress and groom yourself?: Yes    Bathe or shower yourself?: Yes    Feed yourself?  Yes  Do your laundry/housekeeping?: Yes  Manage your money, pay your bills and track your expenses?: Yes  Make your own meals?: Yes    Do your own shopping?: Yes    Previous Hospitalizations:   Any hospitalizations or ED visits within the last 12 months?: No      Advance Care Planning:   Living will: No    Durable POA for healthcare: No    Advanced directive: No    Advanced directive counseling given: No    Five wishes given: Yes    Patient declined ACP directive: Yes    End of Life Decisions reviewed with patient: Yes    Provider agrees with end of life decisions: Yes      Cognitive Screening:   Provider or family/friend/caregiver concerned regarding cognition?: No    PREVENTIVE SCREENINGS      Cardiovascular Screening:    General: Screening Not Indicated and History Lipid Disorder      Diabetes Screening:     General: Screening Not Indicated and History Diabetes      Colorectal Cancer Screening:     General: Patient Declines      Prostate Cancer Screening:    General: Screening Not Indicated      Osteoporosis Screening:    General: Risks and Benefits Discussed    Due for: Bone Density Ultrasound      Abdominal Aortic Aneurysm (AAA) Screening:        General: Patient Declines and Screening Not Indicated      Lung Cancer "Screening:     General: Screening Not Indicated      Hepatitis C Screening:    General: Risks and Benefits Discussed    Hep C Screening Accepted: No     Screening, Brief Intervention, and Referral to Treatment (SBIRT)    Screening  Typical number of drinks in a day: 1  Typical number of drinks in a week: 4  Interpretation: Low risk drinking behavior  Single Item Drug Screening:  How often have you used an illegal drug (including marijuana) or a prescription medication for non-medical reasons in the past year? never    Single Item Drug Screen Score: 0  Interpretation: Negative screen for possible drug use disorder    Brief Intervention  Alcohol & drug use screenings were reviewed  No concerns regarding substance use disorder identified  Healthy alcohol use/limits discussed  Other Counseling Topics:   Car/seat belt/driving safety, skin self-exam, sunscreen and calcium and vitamin D intake and regular weightbearing exercise  No results found  Physical Exam:     /76 (BP Location: Left arm, Patient Position: Sitting, Cuff Size: Large)   Pulse 73   Temp (!) 97 1 °F (36 2 °C) (Temporal)   Resp 16   Ht 5' 10\" (1 778 m)   Wt 113 kg (249 lb)   SpO2 95%   BMI 35 73 kg/m²     Physical Exam  Vitals and nursing note reviewed  Constitutional:       General: He is not in acute distress  Appearance: Normal appearance  He is well-developed  He is obese  HENT:      Head: Normocephalic and atraumatic  Right Ear: Tympanic membrane, ear canal and external ear normal       Left Ear: Tympanic membrane, ear canal and external ear normal       Nose: Nose normal       Mouth/Throat:      Mouth: Mucous membranes are moist    Eyes:      Extraocular Movements: Extraocular movements intact  Conjunctiva/sclera: Conjunctivae normal       Pupils: Pupils are equal, round, and reactive to light  Cardiovascular:      Rate and Rhythm: Normal rate and regular rhythm  Pulses: Normal pulses        Heart " sounds: Normal heart sounds  No murmur heard  Pulmonary:      Effort: Pulmonary effort is normal  No respiratory distress  Breath sounds: Normal breath sounds  Abdominal:      General: Bowel sounds are normal       Palpations: Abdomen is soft  Tenderness: There is no abdominal tenderness  Musculoskeletal:         General: No swelling  Cervical back: Normal range of motion and neck supple  Right lower leg: Edema present  Left lower leg: Edema present  Skin:     General: Skin is warm and dry  Capillary Refill: Capillary refill takes less than 2 seconds  Neurological:      General: No focal deficit present  Mental Status: He is alert and oriented to person, place, and time     Psychiatric:         Mood and Affect: Mood normal          Behavior: Behavior normal           JEOVANY Hall

## 2023-04-27 NOTE — ASSESSMENT & PLAN NOTE
Patient followed by nephrology for kidney disease  Patient referred to endocrinology at this time for evaluation of diabetic medications due to his stage IV kidney disease    Lab Results   Component Value Date    HGBA1C 7 0 (H) 10/25/2022

## 2023-04-27 NOTE — ASSESSMENT & PLAN NOTE
Lab Results   Component Value Date    EGFR 25 01/11/2023    EGFR 31 12/12/2022    EGFR 30 10/25/2022    CREATININE 2 36 (H) 01/11/2023    CREATININE 1 94 (H) 12/12/2022    CREATININE 2 01 (H) 10/25/2022   BP currently 130/76  Patient currently maintained on losartan 25 mg p o  daily percent 15 mg p o  daily, carvedilol 12 5 mg p o  twice daily, clonidine 0 3 mg patch and spironolactone 25 mg every other day  Patient maintain a low-sodium diet  Exercise encouraged 3-5 times per week for at least 75 minutes of cardiovascular activity  Recheck BP next office visit goal BP below 130/70

## 2023-04-27 NOTE — ASSESSMENT & PLAN NOTE
Prior lipid panel reviewed  Repeat lipid panel  Patient maintain a low-cholesterol low-fat diet  Currently maintained on omega-3 fatty acids fish oil 1000 mg p o  daily, fenofibric acid 135 mg daily  Also Pravachol 40 mg p o  daily  Contains abnormal data Lipid panel  Order: [id number]   Status: Final result      Visible to patient: No (inaccessible in [address])      Next appt: 05/23/2023 at 03:00 PM in Nephrology (Jessica Verma MD)      Dx: Diabetes mellitus type 2 in obese [facility])      2 Result Notes  Component Ref Range & Units 10/25/22 11:24 AM 7/16/21  9:39 AM 6/25/20 12:23 PM   Cholesterol See Comment mg/dL 179  160 R, CM  183 R, CM    Comment: Cholesterol:         Pediatric <18 Years         Desirable          <170 mg/dL       Borderline High    170-199 mg/dL       High               >=200 mg/dL         Adult >=18 Years             Desirable         <200 mg/dL       Borderline High   200-239 mg/dL       High             >239 mg/dL    Triglycerides See Comment mg/dL 124  109 R, CM  113 R, CM    Comment: Triglyceride:      0-9Y            <75mg/dL      10Y-17Y         <90 mg/dL        >=18Y      Normal          <150 mg/dL      Borderline High 150-199 mg/dL      High            200-499 mg/dL        Very High       >499 mg/dL     Specimen collection should occur prior to N-Acetylcysteine or Metamizole administration due to the potential for falsely depressed results  HDL, Direct >=40 mg/dL 46  42 CM  47 CM    Comment: Specimen collection should occur prior to Metamizole administration due to the potential for falsley depressed results  LDL Calculated 0 - 100 mg/dL 108 High   96 CM  113 High  CM    Comment: LDL Cholesterol:     Optimal           <100 mg/dl     Near Optimal      100-129 mg/dl     Above Optimal       Borderline High 130-159 mg/dl       High            160-189 mg/dl       Very High       >189 mg/dl           This screening LDL is a calculated result     It does not have the accuracy of the Direct Measured LDL in the monitoring of patients with hyperlipidemia and/or statin therapy  Direct Measure LDL (XBP160) must be ordered separately in these patients     Non-HDL-Chol (CHOL-HDL) mg/dl 133  118  1

## 2023-04-27 NOTE — ASSESSMENT & PLAN NOTE
BMI currently 35 73 kg/M2  Goal BMI 25 kg/M2  SL PG inhibitors discussed with patient at this time  Patient with like to discuss with endocrinology due to side effects of medication

## 2023-04-28 DIAGNOSIS — E11.9 TYPE 2 DIABETES MELLITUS WITHOUT COMPLICATION, WITHOUT LONG-TERM CURRENT USE OF INSULIN (HCC): ICD-10-CM

## 2023-04-28 DIAGNOSIS — Z79.4 TYPE 2 DIABETES MELLITUS WITHOUT COMPLICATION, WITH LONG-TERM CURRENT USE OF INSULIN (HCC): ICD-10-CM

## 2023-04-28 DIAGNOSIS — E11.9 TYPE 2 DIABETES MELLITUS WITHOUT COMPLICATION, WITH LONG-TERM CURRENT USE OF INSULIN (HCC): ICD-10-CM

## 2023-04-28 DIAGNOSIS — E78.2 MIXED HYPERLIPIDEMIA: ICD-10-CM

## 2023-04-28 RX ORDER — LINAGLIPTIN 5 MG/1
5 TABLET, FILM COATED ORAL DAILY
Qty: 30 TABLET | Refills: 0 | Status: SHIPPED | OUTPATIENT
Start: 2023-04-28

## 2023-04-28 RX ORDER — FENOFIBRIC ACID 135 MG/1
CAPSULE, DELAYED RELEASE ORAL
Qty: 30 CAPSULE | Refills: 0 | Status: SHIPPED | OUTPATIENT
Start: 2023-04-28

## 2023-05-03 DIAGNOSIS — I10 ESSENTIAL HYPERTENSION: ICD-10-CM

## 2023-05-03 RX ORDER — CLONIDINE 0.3 MG/24H
PATCH, EXTENDED RELEASE TRANSDERMAL
Qty: 4 PATCH | Refills: 0 | Status: SHIPPED | OUTPATIENT
Start: 2023-05-03

## 2023-05-19 ENCOUNTER — APPOINTMENT (OUTPATIENT)
Dept: LAB | Facility: CLINIC | Age: 83
End: 2023-05-19

## 2023-05-19 ENCOUNTER — CONSULT (OUTPATIENT)
Dept: CARDIOLOGY CLINIC | Facility: CLINIC | Age: 83
End: 2023-05-19

## 2023-05-19 VITALS
DIASTOLIC BLOOD PRESSURE: 78 MMHG | SYSTOLIC BLOOD PRESSURE: 144 MMHG | HEART RATE: 66 BPM | OXYGEN SATURATION: 96 % | HEIGHT: 70 IN | WEIGHT: 248 LBS | BODY MASS INDEX: 35.5 KG/M2

## 2023-05-19 DIAGNOSIS — N18.4 CHRONIC KIDNEY DISEASE (CKD) STAGE G4/A1, SEVERELY DECREASED GLOMERULAR FILTRATION RATE (GFR) BETWEEN 15-29 ML/MIN/1.73 SQUARE METER AND ALBUMINURIA CREATININE RATIO LESS THAN 30 MG/G (HCC): ICD-10-CM

## 2023-05-19 DIAGNOSIS — N18.32 STAGE 3B CHRONIC KIDNEY DISEASE (HCC): ICD-10-CM

## 2023-05-19 DIAGNOSIS — E78.2 MIXED HYPERLIPIDEMIA: ICD-10-CM

## 2023-05-19 DIAGNOSIS — I10 PRIMARY HYPERTENSION: ICD-10-CM

## 2023-05-19 DIAGNOSIS — I35.1 AORTIC EJECTION MURMUR: Primary | ICD-10-CM

## 2023-05-19 DIAGNOSIS — R60.0 LOWER EXTREMITY EDEMA: ICD-10-CM

## 2023-05-19 LAB
25(OH)D3 SERPL-MCNC: 38.3 NG/ML (ref 30–100)
ALBUMIN SERPL BCP-MCNC: 3.5 G/DL (ref 3.5–5)
ANION GAP SERPL CALCULATED.3IONS-SCNC: 4 MMOL/L (ref 4–13)
BUN SERPL-MCNC: 41 MG/DL (ref 5–25)
CALCIUM SERPL-MCNC: 9.5 MG/DL (ref 8.3–10.1)
CHLORIDE SERPL-SCNC: 111 MMOL/L (ref 96–108)
CO2 SERPL-SCNC: 25 MMOL/L (ref 21–32)
CREAT SERPL-MCNC: 2.32 MG/DL (ref 0.6–1.3)
CREAT UR-MCNC: 66.2 MG/DL
GFR SERPL CREATININE-BSD FRML MDRD: 25 ML/MIN/1.73SQ M
GLUCOSE P FAST SERPL-MCNC: 165 MG/DL (ref 65–99)
PHOSPHATE SERPL-MCNC: 3.3 MG/DL (ref 2.3–4.1)
POTASSIUM SERPL-SCNC: 4 MMOL/L (ref 3.5–5.3)
PROT UR-MCNC: 43 MG/DL
PROT/CREAT UR: 0.65 MG/G{CREAT} (ref 0–0.1)
PTH-INTACT SERPL-MCNC: 22.7 PG/ML (ref 12–88)
SODIUM SERPL-SCNC: 140 MMOL/L (ref 135–147)

## 2023-05-19 NOTE — PROGRESS NOTES
[facility]    CHIEF COMPLAINT: No chief complaint on file  HPI:  Jhony Moy is a 82 y o  male with a past medical history significant for hypertension, hyperlipidemia, diabetes mellitus, chronic kidney disease stage IIIB, obesity, LEONORA on CPAP who presents today in consultation for lower extremity swelling  He reports a history of lower extremity swelling for at least 17 years  For the most part his swelling remained stable but he does have good days and bad days  Denies any recent progression of lower extremity swelling  His swelling does not typically get worse throughout the day but does often improve by morning by elevating his legs at night and wearing compression wraps  He does use compression wraps during the nighttime- he and understands that this is not recommended especially with his history of diabetes  He does stay active and still works  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  He does stay active otherwise and is able to work in his yard all day and at a good pace without any exertional symptoms of chest discomfort or dyspnea  He feels his activity tolerance is stable  He does report a history of cardiac catheterization many years ago at [facility]   He reports being told he had a 20% blockage at that time  He feels well and denies any visual changes, lightheadedness, syncope, chest pain or tightness, palpitations, shortness of breath on exertion, orthopnea, PND  Cardiac history: Reports history cardiac cath many years ago at [facility]  May have had 20% blockage at that time       The following portions of the patient's history were reviewed and updated as appropriate: allergies, current medications, past family history, past medical history, past social history, past surgical history, and problem list     SINCE LAST OV I REVIEWED WITH THE PATIENT THE INTERIM LABS, TEST RESULTS, CONSULTANT(S) NOTES AND PERFORMED AN INTERIM REVIEW OF HISTORY    Past Medical History:   Diagnosis Date   • BPH (benign prostatic hyperplasia)    • Diabetes mellitus ([facility] [address] )    • Diverticulosis    • History of cardiac cath    • Hyperglycemia    • Hyperlipidemia    • Hypertension    • Kidney stone    • Macular degeneration     RIGHT   • Obesity    • LEONORA on CPAP    • SBO (small bowel obstruction) (HCC)    • Swollen ankles     LE EDEMA/VI       Past Surgical History:   Procedure Laterality Date   • COLONOSCOPY     • HERNIA REPAIR     • RETINAL DETACHMENT SURGERY     • TONSILLECTOMY         Social History     Socioeconomic History   • Marital status: /Civil Union     Spouse name: Not on file   • Number of children: Not on file   • Years of education: Not on file   • Highest education level: Not on file   Occupational History   • Occupation: RETIRED   Tobacco Use   • Smoking status: Never   • Smokeless tobacco: Never   Vaping Use   • Vaping Use: Never used   Substance and Sexual Activity   • Alcohol use: Never   • Drug use: Never   • Sexual activity: Not on file   Other Topics Concern   • Not on file   Social History Narrative    · Most recent tobacco use screenin2019      · Advance directive:   No        · Caffeine intake: Moderate      · Exercise level:   Occasional      · Live alone or with others:   with others        · Diet:   Regular      · Seat belts used routinely:   Yes      · Smoke alarm in home: Yes      · General stress level:   Low      · Last modified by [id number]   2019, 03:29      Social Determinants of Health     Financial Resource Strain: Low Risk    • Difficulty of Paying Living Expenses: Not hard at all   Food Insecurity: Not on file   Transportation Needs: No Transportation Needs   • Lack of Transportation (Medical): No   • Lack of Transportation (Non-Medical):  No   Physical Activity: Not on file   Stress: Not on file   Social Connections: Not on file   Intimate Partner Violence: Not on file   Housing Stability: Not on file       Family History   Problem Relation Age of Onset   • COPD Mother    • Emphysema Mother    • Lung cancer Father        No Known Allergies    Current Outpatient Medications   Medication Sig Dispense Refill   • aspirin 325 mg tablet Take 325 mg by mouth daily     • Biotin 5 MG TABS Take by mouth daily     • carvedilol (COREG) 12 5 mg tablet Take 1 tablet (12 5 mg total) by mouth 2 (two) times a day 60 tablet 0   • cholecalciferol (VITAMIN D3) 1,000 units tablet Take 1,000 Units by mouth 2 (two) times a day     • Choline Fenofibrate (Fenofibric Acid) 135 MG CPDR TAKE ONE CAPSULE BY MOUTH EVERY DAY 30 capsule 0   • cloNIDine (CATAPRES-TTS-3) 0 3 mg/24 hr PLACE ONE PATCH (0 3MG TOTAL) ON THE SKIN ONCE A WEEK 4 patch 0   • co-enzyme Q-10 30 MG capsule Take 100 mg by mouth daily      • losartan (COZAAR) 25 mg tablet Take 1 tablet (25 mg total) by mouth daily 90 tablet 1   • LUTEIN-ZEAXANTHIN-BILBERRY PO Take by mouth 2 (two) times a day     • milk thistle 175 MG tablet Take 175 mg by mouth daily     • Misc Natural Products (OSTEO BI-FLEX TRIPLE STRENGTH PO) Take 1 tablet by mouth 2 (two) times a day     • multivitamin (THERAGRAN) TABS Take 1 tablet by mouth daily     • Omega-3 Fatty Acids (FISH OIL) 1,000 mg Take 1,000 mg by mouth daily     • pravastatin (PRAVACHOL) 40 mg tablet TAKE ONE TABLET BY MOUTH EVERY DAY 90 tablet 0   • spironolactone (ALDACTONE) 25 mg tablet TAKE ONE TABLET BY MOUTH EVERY OTHER DAY 45 tablet 3   • Taurine 500 MG CAPS Take by mouth daily     • thiamine (VITAMIN B1) 100 mg tablet Take 100 mg by mouth daily     • torsemide (DEMADEX) 10 mg tablet Take 1 5 tablets (15 mg total) by mouth daily 135 tablet 1   • Tradjenta 5 MG TABS Take 5 mg by mouth in the morning   30 tablet 0   • TURMERIC PO Take by mouth       • vitamin E 100 UNIT capsule Take 100 Units by mouth daily       • GLUCOSAMINE-CHONDROITIN PO Take 2 tablets by mouth daily  (Patient "not taking: Reported on 12/22/2022)       No current facility-administered medications for this visit  /78   Pulse 66   Ht 5' 10\" (1 778 m)   Wt 112 kg (248 lb)   SpO2 96%   BMI 35 58 kg/m²     Review of Systems   All other systems reviewed and are negative  Physical Exam  Vitals reviewed  Constitutional:       General: He is not in acute distress  Appearance: He is well-developed and normal weight  He is not toxic-appearing  HENT:      Head: Normocephalic and atraumatic  Eyes:      General: No scleral icterus  Conjunctiva/sclera: Conjunctivae normal    Cardiovascular:      Rate and Rhythm: Normal rate and regular rhythm  Pulses: Normal pulses  Heart sounds: Normal heart sounds  No murmur heard  Pulmonary:      Effort: Pulmonary effort is normal  No respiratory distress  Breath sounds: Normal breath sounds  No wheezing or rales  Abdominal:      General: Abdomen is flat  Bowel sounds are normal  There is no distension  Palpations: Abdomen is soft  Tenderness: There is no abdominal tenderness  Musculoskeletal:         General: No swelling  Cervical back: Neck supple  Right lower leg: Edema present  Left lower leg: Edema present  Skin:     General: Skin is warm and dry  Capillary Refill: Capillary refill takes less than 2 seconds  Coloration: Skin is not jaundiced or pale  Neurological:      Mental Status: He is alert     Psychiatric:         Mood and Affect: Mood normal           Lab Results   Component Value Date    K 4 0 05/19/2023     (H) 05/19/2023    CO2 25 05/19/2023    BUN 41 (H) 05/19/2023    CREATININE 2 32 (H) 05/19/2023    CALCIUM 9 5 05/19/2023    ALT 25 07/16/2021    AST 14 07/16/2021       Lab Results   Component Value Date    HDL 46 10/25/2022    LDLCALC 108 (H) 10/25/2022    TRIG 124 10/25/2022       Lab Results   Component Value Date    WBC 6 70 10/25/2022    HGB 12 8 10/25/2022    HCT 39 3 10/25/2022 "  10/25/2022       Lab Results   Component Value Date     10/25/2022    HGBA1C 7 2 (A) 04/27/2023     Cardiac studies:   Results for orders placed or performed in visit on 05/19/23   POCT ECG    Impression    Normal sinus rhythm  Left axis deviation, consider left anterior fascicular block  Left ventricular hypertrophy with QRS widening  Poor anterior R wave progression       ASSESSMENT AND PLAN:  Diagnoses and all orders for this visit:    Aortic ejection murmur: ECG with LVH and left anterior fascicular block  He has a 2/6 TRACY, preserved S2, no radiation to carotids, no parvus et tardus  Check echocardiogram for baseline function, aortic valve disease, diastology  -     Echo complete w/ contrast if indicated; Future    Lower extremity edema: Reports chronic lower extremity edema without any recent progression of symptoms  Likely multifactorial and related to venous incompetence and chronic kidney disease  Understand compression wraps are not recommend at night time especially with diabetes history  Continue to elevated legs during nighttime  Primary hypertension: His blood pressure was mildly elevated today but he reports home blood pressure readings typically in the 130s/<70s  His current regimen includes clonidine 0 3 mg/24-hour patch, carvedilol 12 5 mg twice daily, losartan 25 mg, spironolactone 12 5 mg daily, torsemide 15 mg daily   -     POCT ECG     Mixed hyperlipidemia: Currently on pravastatin 40 mg and fenofibrate  He does report an history of cardiac cath several years ago and think he had mild coronary disease  No report available for review  We did discuss consider atorvastatin 20 mg which should lead better improvement in LDL reduction  He is undecided about this  Stage IIIB/IV chronic kidney disease: He follows with Dr Kalyan Lovell for his chronic kidney disease and hypertension management    He did have labs today and I reviewed this with him today-these are stable from January and show eGFR 25  It does look like he has been on fenofibrate since prior to these labs but it can cause reversible increases in creatinine level  Could consider discontinuation and repeat  He has a follow-up with Nephrology next week      Dwayne Lopez MD

## 2023-05-19 NOTE — PATIENT INSTRUCTIONS
You were seen today in the Cardiology office  Please have an echocardiogram (ultrasound of the heart) performed for your murmur  Please continue all your medications as prescribed  Your blood pressure slightly elevated today and improved when it was repeated  Continue to monitor your blood pressure at home  Please see the attached DASH diet eating plan  Thank you for choosing [facility]

## 2023-05-22 ENCOUNTER — TELEPHONE (OUTPATIENT)
Dept: FAMILY MEDICINE CLINIC | Facility: CLINIC | Age: 83
End: 2023-05-22

## 2023-05-22 NOTE — TELEPHONE ENCOUNTER
----- Message from Kady Horner 10 Isha Davis sent at 5/19/2023  4:49 PM EDT -----  Please call patient and notify test results are normal   Labs within same range from prior lab work

## 2023-05-23 ENCOUNTER — OFFICE VISIT (OUTPATIENT)
Dept: NEPHROLOGY | Facility: CLINIC | Age: 83
End: 2023-05-23

## 2023-05-23 VITALS
BODY MASS INDEX: 34.79 KG/M2 | SYSTOLIC BLOOD PRESSURE: 142 MMHG | DIASTOLIC BLOOD PRESSURE: 76 MMHG | HEIGHT: 70 IN | HEART RATE: 72 BPM | WEIGHT: 243 LBS

## 2023-05-23 DIAGNOSIS — N18.4 BENIGN HYPERTENSION WITH CHRONIC KIDNEY DISEASE, STAGE IV (HCC): ICD-10-CM

## 2023-05-23 DIAGNOSIS — E78.2 MIXED HYPERLIPIDEMIA: Primary | ICD-10-CM

## 2023-05-23 DIAGNOSIS — N18.9 CHRONIC KIDNEY DISEASE-MINERAL AND BONE DISORDER: ICD-10-CM

## 2023-05-23 DIAGNOSIS — N18.30 BENIGN HYPERTENSION WITH CHRONIC KIDNEY DISEASE, STAGE III (HCC): ICD-10-CM

## 2023-05-23 DIAGNOSIS — N06.9 ISOLATED PROTEINURIA WITH MORPHOLOGIC LESION: ICD-10-CM

## 2023-05-23 DIAGNOSIS — M89.9 CHRONIC KIDNEY DISEASE-MINERAL AND BONE DISORDER: ICD-10-CM

## 2023-05-23 DIAGNOSIS — R60.0 LOWER EXTREMITY EDEMA: ICD-10-CM

## 2023-05-23 DIAGNOSIS — E11.9 TYPE 2 DIABETES MELLITUS WITHOUT COMPLICATION, WITHOUT LONG-TERM CURRENT USE OF INSULIN (HCC): ICD-10-CM

## 2023-05-23 DIAGNOSIS — N18.4: ICD-10-CM

## 2023-05-23 DIAGNOSIS — E09.22: ICD-10-CM

## 2023-05-23 DIAGNOSIS — I12.9 BENIGN HYPERTENSION WITH CHRONIC KIDNEY DISEASE, STAGE IV (HCC): ICD-10-CM

## 2023-05-23 DIAGNOSIS — N18.32 STAGE 3B CHRONIC KIDNEY DISEASE (HCC): Primary | ICD-10-CM

## 2023-05-23 DIAGNOSIS — I12.9 BENIGN HYPERTENSION WITH CHRONIC KIDNEY DISEASE, STAGE III (HCC): ICD-10-CM

## 2023-05-23 DIAGNOSIS — E83.9 CHRONIC KIDNEY DISEASE-MINERAL AND BONE DISORDER: ICD-10-CM

## 2023-05-23 RX ORDER — TORSEMIDE 10 MG/1
10 TABLET ORAL DAILY
Qty: 135 TABLET | Refills: 1
Start: 2023-05-23

## 2023-05-23 RX ORDER — EZETIMIBE 10 MG/1
10 TABLET ORAL DAILY
Qty: 30 TABLET | Refills: 2 | Status: SHIPPED | OUTPATIENT
Start: 2023-05-23 | End: 2023-11-19

## 2023-05-23 RX ORDER — LOSARTAN POTASSIUM 25 MG/1
25 TABLET ORAL 2 TIMES DAILY
Qty: 180 TABLET | Refills: 1 | Status: SHIPPED | OUTPATIENT
Start: 2023-05-23

## 2023-05-23 NOTE — PROGRESS NOTES
NEPHROLOGY OFFICE PROGRESS NOTE   York Scheuermann 82 y o  male MRN: [id number]  DATE: 05/23/23  Reason for visit: Continued evaluation and management of CKD  ASSESSMENT & PLAN:  1  Chronic kidney disease, stage IIIB  · Lance's baseline creatinine was around 1 6 to 2 0 over the past 2 years  · Etiology is felt to be diabetic kidney disease  · Creatinine has gone up to 2 3 with the re-initiation of Losartan  · Renal function is stable - SCr 2 36 and 2 32 the past 2 readings  · Completed CKD education  · He is currently on Losartan and Spironolactone  · Treatment: good BP control and glycemic control  · May consider starting an SGLT2 inhibitor if renal function remains stable  2  Proteinuria:  · UPC ratio was 1 27 but is better now - down to 0 65  · Continue Losartan 25  · Continue Spironolactone 25 mg QOD  3  Hypertension:  · Home BP readings are above goal (<130/80)  · Current regimen: Torsemide 15 mg OD, Clonidine 0 3 mg patch, Carvedilol 12 5 mg BID, Spironolactone 25 mg QOD  · Increase Losartan to 25 mg BID  · Decrease Torsemide to 10 mg daily since no fluid overload and edema is likely a local phenomenon  · No evidence of hyperaldosteronism  · Check home BP in June and update us with readings  4  Mineral and bone disease:  · PTH is at goal   22 7 May 2023  · Ca and phos are at goal    · Vitamin D level is at goal   30 3 in May 2023  Continue Vit D to 2000 units daily  5  Diabetes mellitus:  · Glycemic control is at goal    · Diabetic management is deferred to endocrinology or PCP  · Consider SGLT2 inhibitors in the near future  6  Hyperlipidemia:  · On fenofibrate and pravastatin  · Recommend stopping fibrate and using Ezetemibe given recent rise in SCr - I will send message to PCP  Patient Instructions   You have chronic kidney disease (CKD) and your kidney function is stable  Please decrease Torsemide to 10 mg daily     Please increase Losartan to 25 mg "twice a day  Check BMP in 2 weeks  Call us in 1 month with updated BP readings  Follow up in 6 months - please obtain blood work 1-2 weeks prior to the appointment  Please avoid taking NSAIDs (Ibuprofen, Motrin, Aleve, Advil, Naproxen, Celebrex, etc )  Make sure you are eating healthy and have adequate exercise  SUBJECTIVE / INTERVAL HISTORY:  Jacinto Bui was last seen in the office in Dec 2022  There have been no recent hospitalizations or ER visits  Home BP has been around 140s/70s  He denies any acute complaints  He is tolerating Losartan, Spironolactone and Torsemide without issues  BP cuff calibration in Dec 22, 2022  Home BP cuff (wrist) 163/78  Office BP cuff 170/78     PMH/PSH: HTN, DM, HLP, CAD, LEONORA on CPAP, leg edema, BPH, DJD, SBO, macular degeneration, tonsillectomy, R inguinal hernia, retinal surgery       Previous work up:   November 1, 2019:  Aldosterone 6, plasma renin activity 0 22, UPC ratio 0 85, metanephrines 34, normetanephrines 83, TSH 1 53     10/25/19 Renal US: R 12 9 cm, L 14 3 cm, bilateral cortical cysts  No hydronephrosis  ALLERGIES: No Known Allergies    REVIEW OF SYSTEMS:  Review of Systems   Constitutional: Negative for appetite change, chills, fatigue and fever  Respiratory: Negative for cough and shortness of breath  Cardiovascular: Positive for leg swelling  Negative for chest pain  Gastrointestinal: Negative for abdominal pain, diarrhea, nausea and vomiting  Genitourinary: Negative for difficulty urinating, dysuria and hematuria  Musculoskeletal: Positive for arthralgias  Negative for back pain  Neurological: Negative for dizziness and light-headedness  OBJECTIVE:  /76 (BP Location: Left arm, Patient Position: Sitting, Cuff Size: Large)   Pulse 72   Ht 5' 10\" (1 778 m)   Wt 110 kg (243 lb)   BMI 34 87 kg/m²   Current Weight: Weight - Scale: 110 kg (243 lb) Body mass index is 34 87 kg/m²    Physical Exam  Constitutional:       General: " He is not in acute distress  Appearance: Normal appearance  He is well-developed  He is obese  He is not ill-appearing or diaphoretic  HENT:      Head: Normocephalic and atraumatic  Eyes:      General: No scleral icterus  Conjunctiva/sclera: Conjunctivae normal    Neck:      Vascular: No JVD  Cardiovascular:      Rate and Rhythm: Normal rate and regular rhythm  Heart sounds: Normal heart sounds  Pulmonary:      Effort: Pulmonary effort is normal  No respiratory distress  Breath sounds: Normal breath sounds  Abdominal:      General: Bowel sounds are normal       Palpations: Abdomen is soft  Musculoskeletal:      Cervical back: Neck supple  Comments: Bilateral lower extremity edema   Skin:     General: Skin is warm and dry  Neurological:      Mental Status: He is alert and oriented to person, place, and time     Psychiatric:         Behavior: Behavior normal        Medications:  Current Outpatient Medications:   •  aspirin 325 mg tablet, Take 325 mg by mouth daily, Disp: , Rfl:   •  Biotin 5 MG TABS, Take by mouth daily, Disp: , Rfl:   •  carvedilol (COREG) 12 5 mg tablet, Take 1 tablet (12 5 mg total) by mouth 2 (two) times a day, Disp: 60 tablet, Rfl: 0  •  cholecalciferol (VITAMIN D3) 1,000 units tablet, Take 1,000 Units by mouth 2 (two) times a day, Disp: , Rfl:   •  Choline Fenofibrate (Fenofibric Acid) 135 MG CPDR, TAKE ONE CAPSULE BY MOUTH EVERY DAY, Disp: 30 capsule, Rfl: 0  •  cloNIDine (CATAPRES-TTS-3) 0 3 mg/24 hr, PLACE ONE PATCH (0 3MG TOTAL) ON THE SKIN ONCE A WEEK, Disp: 4 patch, Rfl: 0  •  co-enzyme Q-10 30 MG capsule, Take 100 mg by mouth daily , Disp: , Rfl:   •  losartan (COZAAR) 25 mg tablet, Take 1 tablet (25 mg total) by mouth daily, Disp: 90 tablet, Rfl: 1  •  LUTEIN-ZEAXANTHIN-BILBERRY PO, Take by mouth 2 (two) times a day, Disp: , Rfl:   •  milk thistle 175 MG tablet, Take 175 mg by mouth daily, Disp: , Rfl:   •  Misc Natural Products (OSTEO BI-FLEX TRIPLE STRENGTH PO), Take 1 tablet by mouth 2 (two) times a day, Disp: , Rfl:   •  multivitamin (THERAGRAN) TABS, Take 1 tablet by mouth daily, Disp: , Rfl:   •  Omega-3 Fatty Acids (FISH OIL) 1,000 mg, Take 1,000 mg by mouth daily, Disp: , Rfl:   •  pravastatin (PRAVACHOL) 40 mg tablet, TAKE ONE TABLET BY MOUTH EVERY DAY, Disp: 90 tablet, Rfl: 0  •  spironolactone (ALDACTONE) 25 mg tablet, TAKE ONE TABLET BY MOUTH EVERY OTHER DAY, Disp: 45 tablet, Rfl: 3  •  Taurine 500 MG CAPS, Take by mouth daily, Disp: , Rfl:   •  thiamine (VITAMIN B1) 100 mg tablet, Take 100 mg by mouth daily, Disp: , Rfl:   •  torsemide (DEMADEX) 10 mg tablet, Take 1 5 tablets (15 mg total) by mouth daily, Disp: 135 tablet, Rfl: 1  •  Tradjenta 5 MG TABS, Take 5 mg by mouth in the morning , Disp: 30 tablet, Rfl: 0  •  TURMERIC PO, Take by mouth  , Disp: , Rfl:   •  vitamin E 100 UNIT capsule, Take 100 Units by mouth daily  , Disp: , Rfl:   •  GLUCOSAMINE-CHONDROITIN PO, Take 2 tablets by mouth daily  (Patient not taking: Reported on 12/22/2022), Disp: , Rfl:     Laboratory Results:  Results for orders placed or performed in visit on 05/19/23   Renal function panel   Result Value Ref Range    Albumin 3 5 3 5 - 5 0 g/dL    Calcium 9 5 8 3 - 10 1 mg/dL    Phosphorus 3 3 2 3 - 4 1 mg/dL    BUN 41 (H) 5 - 25 mg/dL    Creatinine 2 32 (H) 0 60 - 1 30 mg/dL    Sodium 140 135 - 147 mmol/L    Potassium 4 0 3 5 - 5 3 mmol/L    Chloride 111 (H) 96 - 108 mmol/L    CO2 25 21 - 32 mmol/L    ANION GAP 4 4 - 13 mmol/L    eGFR 25 ml/min/1 73sq m    Glucose, Fasting 165 (H) 65 - 99 mg/dL   PTH, intact   Result Value Ref Range    PTH 22 7 12 0 - 88 0 pg/mL   Protein / creatinine ratio, urine   Result Value Ref Range    Creatinine, Ur 66 2 mg/dL    Protein Urine Random 43 mg/dL    Prot/Creat Ratio, Ur 0 65 (H) 0 00 - 0 10   Vitamin D 25 hydroxy   Result Value Ref Range    Vit D, 25-Hydroxy 38 3 30 0 - 100 0 ng/mL

## 2023-05-23 NOTE — PATIENT INSTRUCTIONS
You have chronic kidney disease (CKD) and your kidney function is stable  Please decrease Torsemide to 10 mg daily  Please increase Losartan to 25 mg twice a day  Check BMP in 2 weeks  Call us in 1 month with updated BP readings  Follow up in 6 months - please obtain blood work 1-2 weeks prior to the appointment  Please avoid taking NSAIDs (Ibuprofen, Motrin, Aleve, Advil, Naproxen, Celebrex, etc )  Make sure you are eating healthy and have adequate exercise

## 2023-05-30 ENCOUNTER — TELEPHONE (OUTPATIENT)
Dept: FAMILY MEDICINE CLINIC | Facility: CLINIC | Age: 83
End: 2023-05-30

## 2023-05-30 DIAGNOSIS — E78.2 MIXED HYPERLIPIDEMIA: ICD-10-CM

## 2023-05-30 RX ORDER — CARVEDILOL 12.5 MG/1
12.5 TABLET ORAL 2 TIMES DAILY
Qty: 60 TABLET | Refills: 0 | Status: SHIPPED | OUTPATIENT
Start: 2023-05-30

## 2023-05-30 NOTE — TELEPHONE ENCOUNTER
This is Lance Angie calling  My phone number is [phone]  I'm calling to confirm with Doctor Mora Martins if a new prescription ezetimibe is to replace my previous prescription for furosemide  No, that's wrong  No fenofibric  Both cholesterol drug drugs  Give me a call at your convenience  That's all  Thank you  Bye    You received a voice mail from [address] [place] [address]

## 2023-05-30 NOTE — TELEPHONE ENCOUNTER
Called and spoke to patient and states wanted to clarify he is to stop Fenofibrate due to given a new prescription for Zetia(ezetimibe), confirmed he is to stop Fenofibrate and patient states is he to stop Pravastatin as well, do not see any documentation about stopping Pravastatin, Please advise

## 2023-05-31 DIAGNOSIS — Z79.4 TYPE 2 DIABETES MELLITUS WITHOUT COMPLICATION, WITH LONG-TERM CURRENT USE OF INSULIN (HCC): ICD-10-CM

## 2023-05-31 DIAGNOSIS — I10 ESSENTIAL HYPERTENSION: ICD-10-CM

## 2023-05-31 DIAGNOSIS — E11.9 TYPE 2 DIABETES MELLITUS WITHOUT COMPLICATION, WITH LONG-TERM CURRENT USE OF INSULIN (HCC): ICD-10-CM

## 2023-05-31 RX ORDER — LINAGLIPTIN 5 MG/1
5 TABLET, FILM COATED ORAL DAILY
Qty: 30 TABLET | Refills: 0 | Status: SHIPPED | OUTPATIENT
Start: 2023-05-31

## 2023-05-31 RX ORDER — CLONIDINE 0.3 MG/24H
1 PATCH, EXTENDED RELEASE TRANSDERMAL WEEKLY
Qty: 4 PATCH | Refills: 0 | Status: SHIPPED | OUTPATIENT
Start: 2023-05-31

## 2023-05-31 NOTE — TELEPHONE ENCOUNTER
Called and spoke to patient advised to reach out to Nephrologist about Pravastatin  Patient states needs refill on Clonidine patch and Leena Bhardwaj does have appointment 6-  Can medication be sent in until appointment   Pharmacy is [facility]

## 2023-06-07 ENCOUNTER — HOSPITAL ENCOUNTER (OUTPATIENT)
Dept: NON INVASIVE DIAGNOSTICS | Facility: HOSPITAL | Age: 83
Discharge: HOME | End: 2023-06-07
Attending: INTERNAL MEDICINE
Payer: COMMERCIAL

## 2023-06-07 VITALS
HEIGHT: 70 IN | HEART RATE: 70 BPM | BODY MASS INDEX: 34.79 KG/M2 | SYSTOLIC BLOOD PRESSURE: 142 MMHG | DIASTOLIC BLOOD PRESSURE: 76 MMHG | WEIGHT: 243 LBS

## 2023-06-07 DIAGNOSIS — I35.1 AORTIC EJECTION MURMUR: ICD-10-CM

## 2023-06-07 LAB
AORTIC ROOT: 3 CM
AORTIC VALVE MEAN VELOCITY: 19.2 M/S
APICAL FOUR CHAMBER EJECTION FRACTION: 56 %
ASCENDING AORTA: 3 CM
AV AREA BY CONTINUOUS VTI: 1.3 CM2
AV AREA PEAK VELOCITY: 1.3 CM2
AV LVOT MEAN GRADIENT: 3 MMHG
AV LVOT PEAK GRADIENT: 6 MMHG
AV MEAN GRADIENT: 17 MMHG
AV PEAK GRADIENT: 31 MMHG
AV VALVE AREA: 1.32 CM2
AV VELOCITY RATIO: 0.42
DOP CALC AO PEAK VEL: 2.78 M/S
DOP CALC AO VTI: 63.4 CM
DOP CALC LVOT AREA: 3.14 CM2
DOP CALC LVOT DIAMETER: 2 CM
DOP CALC LVOT PEAK VEL VTI: 26.58 CM
DOP CALC LVOT PEAK VEL: 1.18 M/S
DOP CALC LVOT STROKE INDEX: 37.4 ML/M2
DOP CALC LVOT STROKE VOLUME: 83.46
E WAVE DECELERATION TIME: 428 MS
FRACTIONAL SHORTENING: 21 (ref 28–44)
INTERVENTRICULAR SEPTUM IN DIASTOLE (PARASTERNAL SHORT AXIS VIEW): 1.2 CM
INTERVENTRICULAR SEPTUM: 1.2 CM (ref 0.6–1.1)
LAAS-AP2: 20.9 CM2
LAAS-AP4: 18.4 CM2
LEFT ATRIUM SIZE: 4.5 CM
LEFT INTERNAL DIMENSION IN SYSTOLE: 3.4 CM (ref 2.1–4)
LEFT VENTRICULAR INTERNAL DIMENSION IN DIASTOLE: 4.3 CM (ref 3.5–6)
LEFT VENTRICULAR POSTERIOR WALL IN END DIASTOLE: 1.4 CM
LEFT VENTRICULAR STROKE VOLUME: 38 ML
LVSV (TEICH): 38 ML
MV E'TISSUE VEL-SEP: 5 CM/S
MV PEAK A VEL: 1.33 M/S
MV PEAK E VEL: 62 CM/S
MV STENOSIS PRESSURE HALF TIME: 124 MS
MV VALVE AREA P 1/2 METHOD: 1.77
RIGHT ATRIUM AREA SYSTOLE A4C: 13.9 CM2
RIGHT VENTRICLE ID DIMENSION: 3.6 CM
SL CV LEFT ATRIUM LENGTH A2C: 5.3 CM
SL CV LV EF: 55
SL CV PED ECHO LEFT VENTRICLE DIASTOLIC VOLUME (MOD BIPLANE) 2D: 84 ML
SL CV PED ECHO LEFT VENTRICLE SYSTOLIC VOLUME (MOD BIPLANE) 2D: 46 ML
TRICUSPID ANNULAR PLANE SYSTOLIC EXCURSION: 2 CM

## 2023-06-07 PROCEDURE — 93306 TTE W/DOPPLER COMPLETE: CPT | Performed by: INTERNAL MEDICINE

## 2023-06-07 PROCEDURE — 93306 TTE W/DOPPLER COMPLETE: CPT

## 2023-06-15 ENCOUNTER — CONSULT (OUTPATIENT)
Dept: ENDOCRINOLOGY | Facility: CLINIC | Age: 83
End: 2023-06-15
Payer: COMMERCIAL

## 2023-06-15 VITALS
HEART RATE: 69 BPM | OXYGEN SATURATION: 97 % | DIASTOLIC BLOOD PRESSURE: 88 MMHG | WEIGHT: 246 LBS | SYSTOLIC BLOOD PRESSURE: 162 MMHG | BODY MASS INDEX: 35.22 KG/M2 | HEIGHT: 70 IN | TEMPERATURE: 98.1 F

## 2023-06-15 DIAGNOSIS — R74.8 LOW SERUM ALKALINE PHOSPHATASE: ICD-10-CM

## 2023-06-15 DIAGNOSIS — E11.65 TYPE 2 DIABETES MELLITUS WITH HYPERGLYCEMIA, WITHOUT LONG-TERM CURRENT USE OF INSULIN (HCC): Primary | ICD-10-CM

## 2023-06-15 DIAGNOSIS — E09.22: ICD-10-CM

## 2023-06-15 DIAGNOSIS — E66.01 SEVERE OBESITY WITH BODY MASS INDEX (BMI) OF 35.0 TO 35.9 AND COMORBIDITY (HCC): ICD-10-CM

## 2023-06-15 DIAGNOSIS — E78.2 MIXED HYPERLIPIDEMIA: ICD-10-CM

## 2023-06-15 DIAGNOSIS — N18.4: ICD-10-CM

## 2023-06-15 PROBLEM — N18.32 STAGE 3B CHRONIC KIDNEY DISEASE (HCC): Status: RESOLVED | Noted: 2023-05-23 | Resolved: 2023-06-15

## 2023-06-15 PROCEDURE — 99204 OFFICE O/P NEW MOD 45 MIN: CPT | Performed by: INTERNAL MEDICINE

## 2023-06-15 NOTE — PROGRESS NOTES
New Consult Note      CC:Type 2 diabetes    History of Present Illness:   82 yr male with Type 2 diabetes since 2008, CKD4 eGFR 25, low ALP, HTN, HLD, obesity BMI 35 and vitamin D deficiency  He was diagnosed about 15 yrs ago and started on metformin and Tradjenta  Metformin was stopped due to worsening eGFR  Home blood glucose monitoring: does not check    Current meds:  Tradjenta 5mg daily    Opthamology: yes   Macular degeneration  Podiatry: No  Vaccination: Yes   Dental:  Pancreatitis: No    Ace/ARB: losartan  Statin: pravastain  Thyroid issues: No    Patient Active Problem List   Diagnosis   • 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 ([facility] )   • Isolated proteinuria   • Benign hypertension with chronic kidney disease, stage IV ([email or web address] )   • Chronic kidney disease-mineral and bone disorder   • Resistant hypertension   • Type 2 diabetes mellitus with hyperglycemia, without long-term current use of insulin ([facility])   • Screening for osteoporosis   • Heart murmur   • Severe obesity with body mass index (BMI) of 35 0 to 35 9 and comorbidity ([facility] )   • Mixed hyperlipidemia   • Exam for population survey   • Erythema annulare   • Lower extremity edema     Past Medical History:   Diagnosis Date   • BPH (benign prostatic hyperplasia)    • Diabetes mellitus ([facility] )    • Diverticulosis    • History of cardiac cath 2005   • Hyperglycemia    • Hyperlipidemia    • Hypertension    • Kidney stone    • Macular degeneration     RIGHT   • Obesity    • LEONORA on CPAP    • SBO (small bowel obstruction) ([facility])    • Swollen ankles     LE EDEMA/VI      Past Surgical History:   Procedure Laterality Date   • COLONOSCOPY  2007   • HERNIA REPAIR     • RETINAL DETACHMENT SURGERY     • TONSILLECTOMY        Family History   Problem Relation Age of Onset   • COPD Mother    • Emphysema Mother    • Lung cancer Father      Social History     Tobacco Use   • Smoking status: Never   • Smokeless tobacco: Never   Substance Use Topics   • Alcohol use: Never     No Known Allergies      Current Outpatient Medications:   •  aspirin 325 mg tablet, Take 325 mg by mouth daily, Disp: , Rfl:   •  Biotin 5 MG TABS, Take by mouth daily, Disp: , Rfl:   •  carvedilol (COREG) 12 5 mg tablet, Take 1 tablet (12 5 mg total) by mouth 2 (two) times a day, Disp: 60 tablet, Rfl: 0  •  cholecalciferol (VITAMIN D3) 1,000 units tablet, Take 1,000 Units by mouth 2 (two) times a day, Disp: , Rfl:   •  cloNIDine (CATAPRES-TTS-3) 0 3 mg/24 hr, Place 1 patch (0 3 mg total) on the skin over 7 days once a week, Disp: 4 patch, Rfl: 0  •  co-enzyme Q-10 30 MG capsule, Take 100 mg by mouth daily , Disp: , Rfl:   •  ezetimibe (ZETIA) 10 mg tablet, Take 1 tablet (10 mg total) by mouth daily, Disp: 30 tablet, Rfl: 2  •  losartan (COZAAR) 25 mg tablet, Take 1 tablet (25 mg total) by mouth 2 (two) times a day, Disp: 180 tablet, Rfl: 1  •  LUTEIN-ZEAXANTHIN-BILBERRY PO, Take by mouth 2 (two) times a day, Disp: , Rfl:   •  milk thistle 175 MG tablet, Take 175 mg by mouth daily, Disp: , Rfl:   •  Misc Natural Products (OSTEO BI-FLEX TRIPLE STRENGTH PO), Take 1 tablet by mouth 2 (two) times a day, Disp: , Rfl:   •  multivitamin (THERAGRAN) TABS, Take 1 tablet by mouth daily, Disp: , Rfl:   •  Omega-3 Fatty Acids (FISH OIL) 1,000 mg, Take 1,000 mg by mouth daily, Disp: , Rfl:   •  pravastatin (PRAVACHOL) 40 mg tablet, TAKE ONE TABLET BY MOUTH EVERY DAY, Disp: 90 tablet, Rfl: 1  •  spironolactone (ALDACTONE) 25 mg tablet, TAKE ONE TABLET BY MOUTH EVERY OTHER DAY, Disp: 45 tablet, Rfl: 3  •  Taurine 500 MG CAPS, Take by mouth daily, Disp: , Rfl:   •  thiamine (VITAMIN B1) 100 mg tablet, Take 100 mg by mouth daily, Disp: , Rfl:   •  torsemide (DEMADEX) 10 mg tablet, Take 1 tablet (10 mg total) by mouth daily, Disp: 135 tablet, Rfl: 1  •  Tradjenta 5 MG TABS, Take 5 mg by mouth daily, Disp: 30 tablet, Rfl: 0  •  TURMERIC PO, Take by mouth  , Disp: , Rfl:   •  vitamin E 100 UNIT "capsule, Take 100 Units by mouth daily  , Disp: , Rfl:   •  Choline Fenofibrate (Fenofibric Acid) 135 MG CPDR, TAKE ONE CAPSULE BY MOUTH EVERY DAY (Patient not taking: Reported on 6/15/2023), Disp: 30 capsule, Rfl: 0  •  GLUCOSAMINE-CHONDROITIN PO, Take 2 tablets by mouth daily  (Patient not taking: Reported on 12/22/2022), Disp: , Rfl:     Review of Systems   Constitutional: Positive for activity change and appetite change  HENT: Negative  Eyes: Negative  Respiratory: Negative  Cardiovascular: Negative for chest pain  Gastrointestinal: Negative  Endocrine: Negative  Genitourinary: Negative  Musculoskeletal: Negative  Skin: Negative  Allergic/Immunologic: Negative  Neurological: Negative  Hematological: Negative  Psychiatric/Behavioral: Negative  All other systems reviewed and are negative  Physical Exam:  Body mass index is 35 3 kg/m²  /88 (BP Location: Left arm, Patient Position: Sitting, Cuff Size: Large)   Pulse 69   Temp 98 1 °F (36 7 °C) (Tympanic)   Ht 5' 10\" (1 778 m)   Wt 112 kg (246 lb)   SpO2 97%   BMI 35 30 kg/m²    Vitals:    06/15/23 1412   Weight: 112 kg (246 lb)        Physical Exam  Constitutional:       General: He is not in acute distress  Appearance: He is well-developed  He is not ill-appearing  HENT:      Head: Normocephalic and atraumatic  Nose: Nose normal       Mouth/Throat:      Pharynx: Oropharynx is clear  Eyes:      Extraocular Movements: Extraocular movements intact  Conjunctiva/sclera: Conjunctivae normal    Neck:      Thyroid: No thyromegaly  Cardiovascular:      Rate and Rhythm: Normal rate  Pulmonary:      Effort: Pulmonary effort is normal    Musculoskeletal:         General: No deformity  Cervical back: Normal range of motion  Skin:     Capillary Refill: Capillary refill takes less than 2 seconds  Coloration: Skin is not pale  Findings: No rash     Neurological:      Mental Status: He " is alert and oriented to person, place, and time  Psychiatric:         Behavior: Behavior normal          Labs:   Lab Results   Component Value Date    HGBA1C 7 2 (A) 04/27/2023       Lab Results   Component Value Date    QWB8NTAKLHHK 2 380 10/25/2022       Lab Results   Component Value Date    BUN 41 (H) 05/19/2023     (H) 05/19/2023    CO2 25 05/19/2023    CREATININE 2 32 (H) 05/19/2023    CREATININE 2 36 (H) 01/11/2023    CREATININE 1 94 (H) 12/12/2022    K 4 0 05/19/2023     eGFR   Date Value Ref Range Status   05/19/2023 25 ml/min/1 73sq m Final       Lab Results   Component Value Date    ALKPHOS 25 (L) 07/16/2021    ALT 25 07/16/2021    AST 14 07/16/2021       Lab Results   Component Value Date    CHOLESTEROL 179 10/25/2022    CHOLESTEROL 160 07/16/2021    CHOLESTEROL 183 06/25/2020     Lab Results   Component Value Date    HDL 46 10/25/2022    HDL 42 07/16/2021    HDL 47 06/25/2020     Lab Results   Component Value Date    TRIG 124 10/25/2022    TRIG 109 07/16/2021    TRIG 113 06/25/2020     Lab Results   Component Value Date    [place] 133 10/25/2022    [place] 118 07/16/2021    [place] 136 06/25/2020         Impression:  1  Type 2 diabetes mellitus with hyperglycemia, without long-term current use of insulin ([facility])    2  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 ([facility] )    3  Severe obesity with body mass index (BMI) of 35 0 to 35 9 and comorbidity ([facility] )    4  Mixed hyperlipidemia    5  Low serum alkaline phosphatase         Plan:    Diagnoses and all orders for this visit:    Type 2 diabetes mellitus with hyperglycemia, without long-term current use of insulin ([facility] )  He may be uncontrolled with an A1c of 7 2% as A1c may not be accurate in significant CKD      Today we discussed all aspects of diabetes including pathophysiology, risk factors, complications, SAGM, CGM, diet, lifestyle modifications, medical fitness training, diabetes education, goals of therapy, follow up needs and medications including insulin, metformin and GLP1 agonists  Advised to maintain goal blood sugars 70-180mg/dL  Today we agreed to continue current regimen of Tradjenta 5 mg daily  We will get a professional CGM to get accurate information about true glycemia based on which further dose adjustments or medication adjustments can be done  Overall I would favor using a GLP-1 agonist     We will check fructosamine that can sometimes be useful independent of A1c  Would follow-up in 4 to 6 weeks  -     Continous glucose monitoring casey placement; Future  -     Continous glucose monitoring casey intrepretation; Future  -     Fructosamine; Future    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 ([facility] )  Reportedly due to type 2 diabetes, HTN and other chronic illness  He does not seem to have CKD related mineral bone disease  Note normal phosphorus, low ALP and low normal PTH levels  None corrected calcium was normal     -     Ambulatory Referral to Endocrinology    Severe obesity with body mass index (BMI) of 35 0 to 35 9 and comorbidity ([facility] )  Discussed diet and lifestyle modifications  May benefit from a GLP-1 agonist   -     Ambulatory Referral to Endocrinology    Mixed hyperlipidemia    Low serum alkaline phosphatase  -     Comprehensive metabolic panel; Future        I have spent 43 minutes with patient today in which greater than 50% of this time was spent in counseling/coordination of care  Discussed with the patient and all questioned fully answered  He will call me if any problems arise  Educated/ Counseled patient on diagnostic test results, prognosis, risk vs benefit of treatment options, importance of treatment compliance, healthy life and lifestyle choices        [address]

## 2023-06-15 NOTE — PATIENT INSTRUCTIONS
Semaglutide (By injection)   Semaglutide (ijy-s-IHUB-tide)  Treats type 2 diabetes  Lowers the risk of heart attack, stroke, or death in patients with type 2 diabetes and heart or blood vessel disease  Also used to help lose weight and keep the weight off in patients with obesity caused by certain conditions  Brand Name(s): Ozempic 0 25 MG or 0 5 MG Doses, Ozempic 1 MG Doses, Ozempic 2 MG Doses, Wegovy   There may be other brand names for this medicine  When This Medicine Should Not Be Used: This medicine is not right for everyone  Do not use it if you had an allergic reaction to semaglutide, or if you have multiple endocrine neoplasia syndrome type 2 (MEN 2) or if you or anyone in your family has had medullary thyroid cancer  How to Use This Medicine:   Injectable  Your doctor will prescribe your exact dose and tell you how often it should be given  This medicine is given as a shot under your skin  It is given into your stomach, thigh, or upper arm  You may be taught how to give your medicine at home  Make sure you understand all instructions before giving yourself an injection  Do not use more medicine or use it more often than your doctor tells you to  If you use insulin in addition to this medicine, do not mix them into the same syringe  You may give the shots in the same area (including your stomach), but do not give the shots right next to each other  Check the liquid in the pen  It should be clear and colorless  Do not use it if it is cloudy, discolored, or has particles in it  You will be shown the body areas where this shot can be given  Use a different body area each time you give yourself a shot  Keep track of where you give each shot to make sure you rotate body areas  Use a new needle and syringe each time you inject your medicine  Never share medicine pens with others under any circumstances  Sharing needles or pens can result in transmission of infection    This medicine should come with a Medication Guide  Ask your pharmacist for a copy if you do not have one  Missed dose:   Ozempic®: If you miss a dose of this medicine, use it as soon as possible within 5 days after the missed dose  If you miss a dose for more than 5 days, skip the missed dose and go back to your regular dosing schedule  Wegovy™: If you miss a dose, and the next scheduled dose is more than 2 days away, use it as soon as possible  If you miss a dos, and the next scheduled dose is less than 2 days away, skip the missed dose and go back to your regular dosing schedule  If you miss a dose of this medicine for more than 2 weeks, use it on the next scheduled dose  Ask your doctor about how to restart your treatment  Store your new, unused medicine pen in its original carton in the refrigerator  Do not freeze  You may store the opened Ozempic® pen in the refrigerator or at room temperature for 56 days or the opened [facility] pen in the refrigerator or at room temperature for 28 days  Throw away the pen after you use it for 56 days for Ozempic® or 28 days for [facility], even if it still has medicine in it  Drugs and Foods to Avoid:   Ask your doctor or pharmacist before using any other medicine, including over-the-counter medicines, vitamins, and herbal products  Some medicines may affect how semaglutide works  Tell your doctor if you are using other diabetes medicine (including glimepiride, glipizide, glyburide, or insulin) or any oral medicine  Warnings While Using This Medicine:   Tell your doctor if you are pregnant or planning to become pregnant  Do not use this medicine for at least 2 months before you plan to become pregnant  Tell your doctor if you are breastfeeding, or if you have kidney disease, pancreas problems, diabetes, digestion problems, or a history of diabetic retinopathy or depression    This medicine may cause the following problems:  Increased risk of thyroid tumor  Pancreatitis (swelling of the pancreas)  Gallbladder problems, including cholelithiasis, cholecystitis  Low blood sugar (when used with other diabetes medicine)  Kidney problems  Eye or vision problems, including diabetic retinopathy  Increased heart rate  Increased risk for depression or thoughts of suicide  Your doctor will do lab tests at regular visits to check on the effects of this medicine  Keep all appointments  Keep all medicine out of the reach of children  Never share your medicine with anyone  Possible Side Effects While Using This Medicine:   Call your doctor right away if you notice any of these side effects: Allergic reaction: Itching or hives, swelling in your face or hands, swelling or tingling in your mouth or throat, chest tightness, trouble breathing  Blurred vision or any other change in vision  Change in how much or how often you urinate, lower back or side pain, blood in your urine  Shaking, trembling, sweating, fast or pounding heartbeat, lightheadedness, hunger, confusion  Sudden and severe stomach pain, nausea, vomiting, fever, passing gas, stomach upset or bloating, yellow eyes or skin  Unusual moods or behaviors, depression, thoughts of hurting yourself or others  If you notice these less serious side effects, talk with your doctor:   Constipation, diarrhea  Headache, dizziness  Redness, itching, bump, swelling, or any changes in your skin where the shot was given  Tiredness  If you notice other side effects that you think are caused by this medicine, tell your doctor  Call your doctor for medical advice about side effects  You may report side effects to FDA at 6-373-FDA-1081  © Copyright Rani Mcmahon 2022 Information is for End User's use only and may not be sold, redistributed or otherwise used for commercial purposes  The above information is an  only  It is not intended as medical advice for individual conditions or treatments   Talk to your doctor, nurse or pharmacist before following any medical regimen to see if it is safe and effective for you

## 2023-06-20 ENCOUNTER — TELEPHONE (OUTPATIENT)
Dept: FAMILY MEDICINE CLINIC | Facility: CLINIC | Age: 83
End: 2023-06-20

## 2023-06-26 PROBLEM — Z00.8 EXAM FOR POPULATION SURVEY: Status: RESOLVED | Noted: 2023-04-27 | Resolved: 2023-06-26

## 2023-06-26 PROBLEM — Z13.820 SCREENING FOR OSTEOPOROSIS: Status: RESOLVED | Noted: 2021-04-19 | Resolved: 2023-06-26

## 2023-06-27 ENCOUNTER — HOSPITAL ENCOUNTER (OUTPATIENT)
Dept: BONE DENSITY | Facility: MEDICAL CENTER | Age: 83
Discharge: HOME | End: 2023-06-27
Payer: COMMERCIAL

## 2023-06-27 DIAGNOSIS — Z13.820 SCREENING FOR OSTEOPOROSIS: ICD-10-CM

## 2023-06-27 PROCEDURE — 77080 DXA BONE DENSITY AXIAL: CPT

## 2023-06-29 DIAGNOSIS — I10 ESSENTIAL HYPERTENSION: ICD-10-CM

## 2023-06-29 RX ORDER — CLONIDINE 0.3 MG/24H
1 PATCH, EXTENDED RELEASE TRANSDERMAL WEEKLY
Qty: 4 PATCH | Refills: 0 | Status: SHIPPED | OUTPATIENT
Start: 2023-06-29

## 2023-06-30 ENCOUNTER — TELEPHONE (OUTPATIENT)
Dept: FAMILY MEDICINE CLINIC | Facility: CLINIC | Age: 83
End: 2023-06-30

## 2023-06-30 NOTE — TELEPHONE ENCOUNTER
----- Message from [place], [place] sent at 6/30/2023  1:36 PM EDT -----  Please call patient and notify test results are normal   Low risk for bone fracture  A daily intake of calcium of at least 1200 mg and vitamin D, 800-1000 IU, as well as weight bearing and muscle strengthening exercise, fall prevention and avoidance of tobacco and excessive alcohol intake as basic preventive measures are recommended  Repeat in 2 years

## 2023-06-30 NOTE — TELEPHONE ENCOUNTER
Called patient in regards for his DXA bone density results and went straight to voicemail , left a detailed message

## 2023-07-03 DIAGNOSIS — E11.9 TYPE 2 DIABETES MELLITUS WITHOUT COMPLICATION, WITH LONG-TERM CURRENT USE OF INSULIN (HCC): ICD-10-CM

## 2023-07-03 DIAGNOSIS — E78.2 MIXED HYPERLIPIDEMIA: ICD-10-CM

## 2023-07-03 DIAGNOSIS — Z79.4 TYPE 2 DIABETES MELLITUS WITHOUT COMPLICATION, WITH LONG-TERM CURRENT USE OF INSULIN (HCC): ICD-10-CM

## 2023-07-05 RX ORDER — LINAGLIPTIN 5 MG/1
5 TABLET, FILM COATED ORAL DAILY
Qty: 30 TABLET | Refills: 3 | Status: SHIPPED | OUTPATIENT
Start: 2023-07-05

## 2023-07-05 RX ORDER — CARVEDILOL 12.5 MG/1
12.5 TABLET ORAL 2 TIMES DAILY
Qty: 60 TABLET | Refills: 3 | Status: SHIPPED | OUTPATIENT
Start: 2023-07-05

## 2023-07-27 DIAGNOSIS — I10 ESSENTIAL HYPERTENSION: ICD-10-CM

## 2023-07-27 RX ORDER — CLONIDINE 0.3 MG/24H
1 PATCH, EXTENDED RELEASE TRANSDERMAL WEEKLY
Qty: 4 PATCH | Refills: 1 | Status: SHIPPED | OUTPATIENT
Start: 2023-07-27

## 2023-08-02 ENCOUNTER — CLINICAL SUPPORT (OUTPATIENT)
Dept: ENDOCRINOLOGY | Facility: CLINIC | Age: 83
End: 2023-08-02
Payer: COMMERCIAL

## 2023-08-02 DIAGNOSIS — E11.65 TYPE 2 DIABETES MELLITUS WITH HYPERGLYCEMIA, WITHOUT LONG-TERM CURRENT USE OF INSULIN (HCC): ICD-10-CM

## 2023-08-02 PROCEDURE — 95250 CONT GLUC MNTR PHYS/QHP EQP: CPT | Performed by: INTERNAL MEDICINE

## 2023-08-17 ENCOUNTER — CLINICAL SUPPORT (OUTPATIENT)
Dept: ENDOCRINOLOGY | Facility: CLINIC | Age: 83
End: 2023-08-17

## 2023-08-17 DIAGNOSIS — E11.65 TYPE 2 DIABETES MELLITUS WITH HYPERGLYCEMIA, WITHOUT LONG-TERM CURRENT USE OF INSULIN (HCC): Primary | ICD-10-CM

## 2023-08-17 NOTE — PROGRESS NOTES
Patient came into the office to have sensor removed. Unable to pull data sensor became defective and after speaking with Dr. Kindra Banegas agreed to place another sensor on patient. Patient will stop by the office on Monday to make sure the sensor is still active.

## 2023-08-25 DIAGNOSIS — E78.2 MIXED HYPERLIPIDEMIA: ICD-10-CM

## 2023-08-25 RX ORDER — EZETIMIBE 10 MG/1
10 TABLET ORAL DAILY
Qty: 30 TABLET | Refills: 0 | Status: SHIPPED | OUTPATIENT
Start: 2023-08-25 | End: 2024-02-21

## 2023-08-31 ENCOUNTER — CLINICAL SUPPORT (OUTPATIENT)
Dept: ENDOCRINOLOGY | Facility: CLINIC | Age: 83
End: 2023-08-31
Payer: COMMERCIAL

## 2023-08-31 DIAGNOSIS — E11.65 TYPE 2 DIABETES MELLITUS WITH HYPERGLYCEMIA, WITHOUT LONG-TERM CURRENT USE OF INSULIN (HCC): ICD-10-CM

## 2023-08-31 PROCEDURE — 95251 CONT GLUC MNTR ANALYSIS I&R: CPT | Performed by: INTERNAL MEDICINE

## 2023-09-01 DIAGNOSIS — N18.30 BENIGN HYPERTENSION WITH CHRONIC KIDNEY DISEASE, STAGE III (HCC): ICD-10-CM

## 2023-09-01 DIAGNOSIS — I12.9 BENIGN HYPERTENSION WITH CHRONIC KIDNEY DISEASE, STAGE III (HCC): ICD-10-CM

## 2023-09-01 RX ORDER — TORSEMIDE 10 MG/1
TABLET ORAL
Qty: 135 TABLET | Refills: 2 | Status: SHIPPED | OUTPATIENT
Start: 2023-09-01

## 2023-09-05 NOTE — PROGRESS NOTES
Continous glucose monitoring casey intrepretation     Date/Time 8/31/2023 11:45 AM     Performed by  Jonas Bowden   Authorized by Gloria Hodge MD     [place] Protocol   Consent: Verbal consent obtained. Written consent obtained. Consent given by: patient  Timeout called at: 9/5/2023 2:33 PM.  Patient understanding: patient states understanding of the procedure being performed  Patient consent: the patient's understanding of the procedure matches consent given  Procedure consent: procedure consent matches procedure scheduled  Relevant documents: relevant documents present and verified  Test results: test results available and properly labeled  Site marked: the operative site was not marked  Radiology Images displayed and confirmed.  If images not available, report reviewed: imaging studies not available  Patient identity confirmed: verbally with patient        Local anesthesia used: no     Anesthesia   Local anesthesia used: no     Sedation   Patient sedated: no        Specimen: no    Culture: no   Procedure Details   Procedure Notes: 15 Days    Patient tolerance: patient tolerated the procedure well with no immediate complications

## 2023-09-12 LAB
LEFT EYE DIABETIC RETINOPATHY: NORMAL
RIGHT EYE DIABETIC RETINOPATHY: NORMAL

## 2023-09-18 ENCOUNTER — APPOINTMENT (OUTPATIENT)
Dept: LAB | Facility: CLINIC | Age: 83
End: 2023-09-18
Payer: COMMERCIAL

## 2023-09-18 DIAGNOSIS — E11.65 TYPE 2 DIABETES MELLITUS WITH HYPERGLYCEMIA, WITHOUT LONG-TERM CURRENT USE OF INSULIN (HCC): ICD-10-CM

## 2023-09-18 DIAGNOSIS — E66.9 DIABETES MELLITUS TYPE 2 IN OBESE: ICD-10-CM

## 2023-09-18 DIAGNOSIS — R74.8 LOW SERUM ALKALINE PHOSPHATASE: ICD-10-CM

## 2023-09-18 DIAGNOSIS — E11.69 DIABETES MELLITUS TYPE 2 IN OBESE: ICD-10-CM

## 2023-09-18 DIAGNOSIS — E78.2 MIXED HYPERLIPIDEMIA: ICD-10-CM

## 2023-09-18 DIAGNOSIS — N18.30 BENIGN HYPERTENSION WITH CHRONIC KIDNEY DISEASE, STAGE III (HCC): ICD-10-CM

## 2023-09-18 DIAGNOSIS — I12.9 BENIGN HYPERTENSION WITH CHRONIC KIDNEY DISEASE, STAGE III (HCC): ICD-10-CM

## 2023-09-18 DIAGNOSIS — Z00.8 EXAM FOR POPULATION SURVEY: ICD-10-CM

## 2023-09-18 LAB
ALBUMIN SERPL BCP-MCNC: 4 G/DL (ref 3.5–5)
ALP SERPL-CCNC: 51 U/L (ref 34–104)
ALT SERPL W P-5'-P-CCNC: 16 U/L (ref 7–52)
ANION GAP SERPL CALCULATED.3IONS-SCNC: 7 MMOL/L
AST SERPL W P-5'-P-CCNC: 14 U/L (ref 13–39)
BILIRUB SERPL-MCNC: 0.54 MG/DL (ref 0.2–1)
BUN SERPL-MCNC: 40 MG/DL (ref 5–25)
CALCIUM SERPL-MCNC: 9.3 MG/DL (ref 8.4–10.2)
CHLORIDE SERPL-SCNC: 110 MMOL/L (ref 96–108)
CO2 SERPL-SCNC: 27 MMOL/L (ref 21–32)
CREAT SERPL-MCNC: 1.83 MG/DL (ref 0.6–1.3)
GFR SERPL CREATININE-BSD FRML MDRD: 33 ML/MIN/1.73SQ M
GLUCOSE P FAST SERPL-MCNC: 144 MG/DL (ref 65–99)
POTASSIUM SERPL-SCNC: 3.9 MMOL/L (ref 3.5–5.3)
PROT SERPL-MCNC: 6.9 G/DL (ref 6.4–8.4)
SODIUM SERPL-SCNC: 144 MMOL/L (ref 135–147)

## 2023-09-18 PROCEDURE — 36415 COLL VENOUS BLD VENIPUNCTURE: CPT

## 2023-09-18 PROCEDURE — 80053 COMPREHEN METABOLIC PANEL: CPT

## 2023-09-18 PROCEDURE — 82985 ASSAY OF GLYCATED PROTEIN: CPT

## 2023-09-19 ENCOUNTER — TELEPHONE (OUTPATIENT)
Dept: NEPHROLOGY | Facility: CLINIC | Age: 83
End: 2023-09-19

## 2023-09-19 LAB — FRUCTOSAMINE SERPL-SCNC: 278 UMOL/L (ref 0–285)

## 2023-09-19 NOTE — TELEPHONE ENCOUNTER
Pt advised that labs of 9/18/23 show stable kidney function per Dr. Lauri Bhardwaj. Pt wanted Dr. Lauri Bhardwaj to know that he has been working hard on his diet and has lost   15 lbs. Since his last visit.      ----- Message from Stevo Boyle MD sent at 9/18/2023  3:36 PM EDT -----  Please inform patient that most recent labs (9/18/23) show that kidney function is stable.

## 2023-09-21 ENCOUNTER — OFFICE VISIT (OUTPATIENT)
Dept: ENDOCRINOLOGY | Facility: CLINIC | Age: 83
End: 2023-09-21
Payer: COMMERCIAL

## 2023-09-21 VITALS
TEMPERATURE: 98.1 F | DIASTOLIC BLOOD PRESSURE: 70 MMHG | HEART RATE: 62 BPM | OXYGEN SATURATION: 98 % | WEIGHT: 233.8 LBS | HEIGHT: 70 IN | SYSTOLIC BLOOD PRESSURE: 128 MMHG | BODY MASS INDEX: 33.47 KG/M2

## 2023-09-21 DIAGNOSIS — E66.01 SEVERE OBESITY WITH BODY MASS INDEX (BMI) OF 35.0 TO 35.9 AND COMORBIDITY: ICD-10-CM

## 2023-09-21 DIAGNOSIS — M85.89 OSTEOPENIA OF MULTIPLE SITES: ICD-10-CM

## 2023-09-21 DIAGNOSIS — E11.65 TYPE 2 DIABETES MELLITUS WITH HYPERGLYCEMIA, WITHOUT LONG-TERM CURRENT USE OF INSULIN (HCC): Primary | ICD-10-CM

## 2023-09-21 DIAGNOSIS — E78.2 MIXED HYPERLIPIDEMIA: ICD-10-CM

## 2023-09-21 PROCEDURE — 95251 CONT GLUC MNTR ANALYSIS I&R: CPT | Performed by: INTERNAL MEDICINE

## 2023-09-21 PROCEDURE — 99214 OFFICE O/P EST MOD 30 MIN: CPT | Performed by: INTERNAL MEDICINE

## 2023-09-21 NOTE — PROGRESS NOTES
Follow-up Patient Progress Note      CC:Type 2 diabetes     History of Present Illness:   82 yr male with Type 2 diabetes since 2008, CKD4 eGFR 25, low ALP, HTN, HLD, obesity BMI 35 and vitamin D deficiency. Last visit was 6/15/23.     Since last visit, he lost 13 lbs. He made significant changes to diet and lifestyle and feels well. Some improved energy is reported.       CGM data review[de-identified]  Device: Stephon pro dates: 8/17/2023-8/31/2023 usage: 100% Av glu: 123 mg/dL  SD:  mg/dL CV: 15.6% GMI:   %  TIR: 99%  TAR: 1%  TBR: 0%    Glycemic patters: Excellent control with normal glycemia throughout. Hypoglycemia: No       Current meds:  Tradjenta 5mg daily     Opthamology: yes. Macular degeneration  Podiatry: No  Vaccination: Yes   Dental:  Pancreatitis: No     Ace/ARB: losartan  Statin: pravastain  Thyroid issues: No    6/27/23 DXA: T Scores 1.5 LS, -0.7LTH, -1.4 LFN and -1.1 Lt forearm.  10 yr FRAX score 2.1% hip and 6.3% major osteoporotic fracture.        Patient Active Problem List   Diagnosis   • 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 ([address])   • Isolated proteinuria   • Benign hypertension with chronic kidney disease, stage IV ([address])   • Chronic kidney disease-mineral and bone disorder   • Resistant hypertension   • Type 2 diabetes mellitus with hyperglycemia, without long-term current use of insulin ([facility])   • Heart murmur   • Severe obesity with body mass index (BMI) of 35.0 to 35.9 and comorbidity ([address])   • Mixed hyperlipidemia   • Erythema annulare   • Lower extremity edema     Past Medical History:   Diagnosis Date   • BPH (benign prostatic hyperplasia)    • Diabetes mellitus ([address])    • Diverticulosis    • History of cardiac cath 2005   • Hyperglycemia    • Hyperlipidemia    • Hypertension    • Kidney stone    • Macular degeneration     RIGHT   • Obesity    • LEONORA on CPAP    • SBO (small bowel obstruction) ([facility])    • Swollen ankles     LE EDEMA/VI      Past Surgical History: Procedure Laterality Date   • COLONOSCOPY  2007   • HERNIA REPAIR     • RETINAL DETACHMENT SURGERY     • TONSILLECTOMY        Family History   Problem Relation Age of Onset   • COPD Mother    • Emphysema Mother    • Lung cancer Father      Social History     Tobacco Use   • Smoking status: Never   • Smokeless tobacco: Never   Substance Use Topics   • Alcohol use: Never     No Known Allergies      Current Outpatient Medications:   •  aspirin 325 mg tablet, Take 325 mg by mouth daily, Disp: , Rfl:   •  Biotin 5 MG TABS, Take by mouth daily, Disp: , Rfl:   •  carvedilol (COREG) 12.5 mg tablet, Take 1 tablet (12.5 mg total) by mouth 2 (two) times a day, Disp: 60 tablet, Rfl: 3  •  cholecalciferol (VITAMIN D3) 1,000 units tablet, Take 1,000 Units by mouth 2 (two) times a day, Disp: , Rfl:   •  cloNIDine (CATAPRES-TTS-3) 0.3 mg/24 hr, Place 1 patch (0.3 mg total) on the skin over 7 days once a week, Disp: 4 patch, Rfl: 1  •  co-enzyme Q-10 30 MG capsule, Take 100 mg by mouth daily , Disp: , Rfl:   •  ezetimibe (ZETIA) 10 mg tablet, Take 1 tablet (10 mg total) by mouth daily, Disp: 30 tablet, Rfl: 0  •  losartan (COZAAR) 25 mg tablet, Take 1 tablet (25 mg total) by mouth 2 (two) times a day, Disp: 180 tablet, Rfl: 1  •  LUTEIN-ZEAXANTHIN-BILBERRY PO, Take by mouth 2 (two) times a day, Disp: , Rfl:   •  milk thistle 175 MG tablet, Take 175 mg by mouth daily, Disp: , Rfl:   •  Misc Natural Products (OSTEO BI-FLEX TRIPLE STRENGTH PO), Take 1 tablet by mouth 2 (two) times a day, Disp: , Rfl:   •  multivitamin (THERAGRAN) TABS, Take 1 tablet by mouth daily, Disp: , Rfl:   •  Omega-3 Fatty Acids (FISH OIL) 1,000 mg, Take 1,000 mg by mouth daily, Disp: , Rfl:   •  pravastatin (PRAVACHOL) 40 mg tablet, TAKE ONE TABLET BY MOUTH EVERY DAY, Disp: 90 tablet, Rfl: 1  •  spironolactone (ALDACTONE) 25 mg tablet, TAKE ONE TABLET BY MOUTH EVERY OTHER DAY, Disp: 45 tablet, Rfl: 3  •  Taurine 500 MG CAPS, Take by mouth daily, Disp: , Rfl: •  thiamine (VITAMIN B1) 100 mg tablet, Take 100 mg by mouth daily, Disp: , Rfl:   •  torsemide (DEMADEX) 10 mg tablet, TAKE ONE AND A HALF TABLETS BY MOUTH EVERY DAY, Disp: 135 tablet, Rfl: 2  •  Tradjenta 5 MG TABS, Take 5 mg by mouth daily, Disp: 30 tablet, Rfl: 3  •  TURMERIC PO, Take by mouth  , Disp: , Rfl:   •  vitamin E 100 UNIT capsule, Take 100 Units by mouth daily  , Disp: , Rfl:   •  Choline Fenofibrate (Fenofibric Acid) 135 MG CPDR, TAKE ONE CAPSULE BY MOUTH EVERY DAY (Patient not taking: Reported on 6/15/2023), Disp: 30 capsule, Rfl: 0  •  GLUCOSAMINE-CHONDROITIN PO, Take 2 tablets by mouth daily  (Patient not taking: Reported on 12/22/2022), Disp: , Rfl:     Review of Systems   Constitutional: Positive for activity change and appetite change. HENT: Negative. Eyes: Negative. Respiratory: Negative. Cardiovascular: Negative for chest pain. Gastrointestinal: Negative. Endocrine: Negative. Genitourinary: Negative. Musculoskeletal: Negative. Skin: Negative. Allergic/Immunologic: Negative. Neurological: Negative. Hematological: Negative. Psychiatric/Behavioral: Negative. All other systems reviewed and are negative. Physical Exam:  Body mass index is 33.55 kg/m². /70 (BP Location: Left arm, Patient Position: Sitting, Cuff Size: Large)   Pulse 62   Temp 98.1 °F (36.7 °C) (Tympanic)   Ht 5' 10" (1.778 m)   Wt 106 kg (233 lb 12.8 oz)   SpO2 98%   BMI 33.55 kg/m²    Vitals:    09/21/23 1132   Weight: 106 kg (233 lb 12.8 oz)        Physical Exam  Constitutional:       General: He is not in acute distress. Appearance: He is well-developed. He is not ill-appearing. HENT:      Head: Normocephalic and atraumatic. Nose: Nose normal.      Mouth/Throat:      Pharynx: Oropharynx is clear. Eyes:      Extraocular Movements: Extraocular movements intact. Conjunctiva/sclera: Conjunctivae normal.   Neck:      Thyroid: No thyromegaly. Cardiovascular:      Rate and Rhythm: Normal rate. Pulmonary:      Effort: Pulmonary effort is normal.   Musculoskeletal:         General: No deformity. Cervical back: Normal range of motion. Skin:     Capillary Refill: Capillary refill takes less than 2 seconds. Coloration: Skin is not pale. Findings: No rash. Neurological:      Mental Status: He is alert and oriented to person, place, and time. Psychiatric:         Behavior: Behavior normal.         Labs:   Lab Results   Component Value Date    HGBA1C 7.2 (A) 04/27/2023       Lab Results   Component Value Date    FCX3FAGVXOEE 2.380 10/25/2022       Lab Results   Component Value Date    CREATININE 1.83 (H) 09/18/2023    CREATININE 2.32 (H) 05/19/2023    CREATININE 2.36 (H) 01/11/2023    BUN 40 (H) 09/18/2023    K 3.9 09/18/2023     (H) 09/18/2023    CO2 27 09/18/2023     eGFR   Date Value Ref Range Status   09/18/2023 33 ml/min/1.73sq m Final       Lab Results   Component Value Date    ALT 16 09/18/2023    AST 14 09/18/2023    ALKPHOS 51 09/18/2023       Lab Results   Component Value Date    CHOLESTEROL 179 10/25/2022    CHOLESTEROL 160 07/16/2021    CHOLESTEROL 183 06/25/2020     Lab Results   Component Value Date    HDL 46 10/25/2022    HDL 42 07/16/2021    HDL 47 06/25/2020     Lab Results   Component Value Date    TRIG 124 10/25/2022    TRIG 109 07/16/2021    TRIG 113 06/25/2020     Lab Results   Component Value Date    [address] 133 10/25/2022    [address] 118 07/16/2021    [address] 136 06/25/2020         Impression:  1. Type 2 diabetes mellitus with hyperglycemia, without long-term current use of insulin ([address])    2. Severe obesity with body mass index (BMI) of 35.0 to 35.9 and comorbidity ([address])    3. Mixed hyperlipidemia    4. Osteopenia of multiple sites         Plan:    Elina Sim was seen today for follow-up.     Diagnoses and all orders for this visit:    Type 2 diabetes mellitus with hyperglycemia, without long-term current use of insulin ([address]). He seems to be in great control with TIR 99% on recent AGP and a fructosamine level is normal.    Today we discussed options and agreed to continue with current dose of Tradjenta and continue diet and lifestyle modifications which seem to be the biggest reason for improvement. Follow-up in 6 months with repeat A1c.  -     Hemoglobin A1C; Future    Severe obesity with body mass index (BMI) of 35.0 to 35.9 and comorbidity ([address] Today we discussed all aspects of obesity and metabolism including pathophysiology, risk factors, complications, goal of sustaining weight loss long term, usual propensity to regain weight with short term approaches, follow up needs and medications - efficacy and limitations. Discussed role of endocrinopathies, inflammatory conditions, sleep disorders, mental health disorders, lifestyle issues and polypharmacy and weight gain. Briefly discussed bariatric surgery. Diet: carb controlled diet <1500cal/day/ VLCD, 64oz fluids/day   lifestyle modifications: intermittent fasting and 10,000 steps/day  medical fitness training: muscle building  education: nutrition input    Mixed hyperlipidemia. Continue pravastatin 40 mg daily. Osteopenia of multiple sites. Recent DEXA bone scan shows osteopenia. This was reviewed in detail with the patient. Recommended calcium 1200 Mg daily via diet and supplement combined, vitamin D3 2000 to 5000 units daily. Advised to continue with weightbearing activity and some muscle strengthening activity to help improve bone. Note FRAX score for hip was 2.1%. I have spent 31 minutes with patient today in which greater than 50% of this time was spent in counseling/coordination of care. Discussed with the patient and all questioned fully answered. He will call me if any problems arise.     Educated/ Counseled patient on diagnostic test results, prognosis, risk vs benefit of treatment options, importance of treatment compliance, healthy life and lifestyle choices.       [facility]

## 2023-09-27 DIAGNOSIS — I10 ESSENTIAL HYPERTENSION: ICD-10-CM

## 2023-09-27 DIAGNOSIS — E78.2 MIXED HYPERLIPIDEMIA: ICD-10-CM

## 2023-09-27 RX ORDER — EZETIMIBE 10 MG/1
10 TABLET ORAL DAILY
Qty: 30 TABLET | Refills: 0 | Status: SHIPPED | OUTPATIENT
Start: 2023-09-27 | End: 2024-03-25

## 2023-09-27 RX ORDER — CLONIDINE 0.3 MG/24H
1 PATCH, EXTENDED RELEASE TRANSDERMAL WEEKLY
Qty: 4 PATCH | Refills: 0 | Status: SHIPPED | OUTPATIENT
Start: 2023-09-27

## 2023-10-26 DIAGNOSIS — Z79.4 TYPE 2 DIABETES MELLITUS WITHOUT COMPLICATION, WITH LONG-TERM CURRENT USE OF INSULIN (HCC): ICD-10-CM

## 2023-10-26 DIAGNOSIS — E78.2 MIXED HYPERLIPIDEMIA: ICD-10-CM

## 2023-10-26 DIAGNOSIS — I10 ESSENTIAL HYPERTENSION: ICD-10-CM

## 2023-10-26 DIAGNOSIS — E11.9 TYPE 2 DIABETES MELLITUS WITHOUT COMPLICATION, WITH LONG-TERM CURRENT USE OF INSULIN (HCC): ICD-10-CM

## 2023-10-26 RX ORDER — LINAGLIPTIN 5 MG/1
5 TABLET, FILM COATED ORAL DAILY
Qty: 30 TABLET | Refills: 0 | Status: SHIPPED | OUTPATIENT
Start: 2023-10-26

## 2023-10-26 RX ORDER — EZETIMIBE 10 MG/1
10 TABLET ORAL DAILY
Qty: 30 TABLET | Refills: 0 | Status: SHIPPED | OUTPATIENT
Start: 2023-10-26 | End: 2024-04-23

## 2023-10-26 RX ORDER — CLONIDINE 0.3 MG/24H
1 PATCH, EXTENDED RELEASE TRANSDERMAL WEEKLY
Qty: 4 PATCH | Refills: 0 | Status: SHIPPED | OUTPATIENT
Start: 2023-10-26

## 2023-10-26 NOTE — TELEPHONE ENCOUNTER
Called pt he is aware that his medication was called in and stated that he is going to get his blood work done and will call when gets done to schedule an appointment

## 2023-11-02 ENCOUNTER — APPOINTMENT (OUTPATIENT)
Dept: LAB | Facility: CLINIC | Age: 83
End: 2023-11-02
Payer: COMMERCIAL

## 2023-11-02 DIAGNOSIS — E11.65 TYPE 2 DIABETES MELLITUS WITH HYPERGLYCEMIA, WITHOUT LONG-TERM CURRENT USE OF INSULIN (HCC): ICD-10-CM

## 2023-11-02 LAB
ALBUMIN SERPL BCP-MCNC: 4 G/DL (ref 3.5–5)
ALP SERPL-CCNC: 48 U/L (ref 34–104)
ALT SERPL W P-5'-P-CCNC: 16 U/L (ref 7–52)
ANION GAP SERPL CALCULATED.3IONS-SCNC: 10 MMOL/L
AST SERPL W P-5'-P-CCNC: 16 U/L (ref 13–39)
BACTERIA UR QL AUTO: ABNORMAL /HPF
BASOPHILS # BLD AUTO: 0.06 THOUSANDS/ÂΜL (ref 0–0.1)
BASOPHILS NFR BLD AUTO: 1 % (ref 0–1)
BILIRUB SERPL-MCNC: 0.56 MG/DL (ref 0.2–1)
BILIRUB UR QL STRIP: NEGATIVE
BUN SERPL-MCNC: 36 MG/DL (ref 5–25)
CALCIUM SERPL-MCNC: 9.3 MG/DL (ref 8.4–10.2)
CHLORIDE SERPL-SCNC: 107 MMOL/L (ref 96–108)
CHOLEST SERPL-MCNC: 147 MG/DL
CLARITY UR: CLEAR
CO2 SERPL-SCNC: 25 MMOL/L (ref 21–32)
COLOR UR: COLORLESS
CREAT SERPL-MCNC: 1.65 MG/DL (ref 0.6–1.3)
CREAT UR-MCNC: 51.8 MG/DL
EOSINOPHIL # BLD AUTO: 0.22 THOUSAND/ÂΜL (ref 0–0.61)
EOSINOPHIL NFR BLD AUTO: 3 % (ref 0–6)
ERYTHROCYTE [DISTWIDTH] IN BLOOD BY AUTOMATED COUNT: 13.1 % (ref 11.6–15.1)
EST. AVERAGE GLUCOSE BLD GHB EST-MCNC: 160 MG/DL
GFR SERPL CREATININE-BSD FRML MDRD: 38 ML/MIN/1.73SQ M
GLUCOSE P FAST SERPL-MCNC: 146 MG/DL (ref 65–99)
GLUCOSE UR STRIP-MCNC: NEGATIVE MG/DL
HBA1C MFR BLD: 7.2 %
HCT VFR BLD AUTO: 39.9 % (ref 36.5–49.3)
HDLC SERPL-MCNC: 42 MG/DL
HGB BLD-MCNC: 12.5 G/DL (ref 12–17)
HGB UR QL STRIP.AUTO: NEGATIVE
HYALINE CASTS #/AREA URNS LPF: ABNORMAL /LPF
IMM GRANULOCYTES # BLD AUTO: 0.03 THOUSAND/UL (ref 0–0.2)
IMM GRANULOCYTES NFR BLD AUTO: 0 % (ref 0–2)
KETONES UR STRIP-MCNC: NEGATIVE MG/DL
LDLC SERPL CALC-MCNC: 72 MG/DL (ref 0–100)
LEUKOCYTE ESTERASE UR QL STRIP: NEGATIVE
LYMPHOCYTES # BLD AUTO: 2.55 THOUSANDS/ÂΜL (ref 0.6–4.47)
LYMPHOCYTES NFR BLD AUTO: 32 % (ref 14–44)
MCH RBC QN AUTO: 30.5 PG (ref 26.8–34.3)
MCHC RBC AUTO-ENTMCNC: 31.3 G/DL (ref 31.4–37.4)
MCV RBC AUTO: 97 FL (ref 82–98)
MICROALBUMIN UR-MCNC: 103.4 MG/L
MICROALBUMIN/CREAT 24H UR: 200 MG/G CREATININE (ref 0–30)
MONOCYTES # BLD AUTO: 0.69 THOUSAND/ÂΜL (ref 0.17–1.22)
MONOCYTES NFR BLD AUTO: 9 % (ref 4–12)
NEUTROPHILS # BLD AUTO: 4.41 THOUSANDS/ÂΜL (ref 1.85–7.62)
NEUTS SEG NFR BLD AUTO: 55 % (ref 43–75)
NITRITE UR QL STRIP: NEGATIVE
NON-SQ EPI CELLS URNS QL MICRO: ABNORMAL /HPF
NONHDLC SERPL-MCNC: 105 MG/DL
NRBC BLD AUTO-RTO: 0 /100 WBCS
PH UR STRIP.AUTO: 5.5 [PH]
PLATELET # BLD AUTO: 188 THOUSANDS/UL (ref 149–390)
PMV BLD AUTO: 9.9 FL (ref 8.9–12.7)
POTASSIUM SERPL-SCNC: 4.4 MMOL/L (ref 3.5–5.3)
PROT SERPL-MCNC: 7.1 G/DL (ref 6.4–8.4)
PROT UR STRIP-MCNC: ABNORMAL MG/DL
RBC # BLD AUTO: 4.1 MILLION/UL (ref 3.88–5.62)
RBC #/AREA URNS AUTO: ABNORMAL /HPF
SODIUM SERPL-SCNC: 142 MMOL/L (ref 135–147)
SP GR UR STRIP.AUTO: 1.01 (ref 1–1.03)
TRIGL SERPL-MCNC: 163 MG/DL
UROBILINOGEN UR STRIP-ACNC: <2 MG/DL
WBC # BLD AUTO: 7.96 THOUSAND/UL (ref 4.31–10.16)
WBC #/AREA URNS AUTO: ABNORMAL /HPF

## 2023-11-02 PROCEDURE — 85025 COMPLETE CBC W/AUTO DIFF WBC: CPT

## 2023-11-02 PROCEDURE — 82043 UR ALBUMIN QUANTITATIVE: CPT

## 2023-11-02 PROCEDURE — 82570 ASSAY OF URINE CREATININE: CPT

## 2023-11-02 PROCEDURE — 80053 COMPREHEN METABOLIC PANEL: CPT

## 2023-11-02 PROCEDURE — 80061 LIPID PANEL: CPT

## 2023-11-02 PROCEDURE — 83036 HEMOGLOBIN GLYCOSYLATED A1C: CPT

## 2023-11-24 DIAGNOSIS — E78.2 MIXED HYPERLIPIDEMIA: ICD-10-CM

## 2023-11-24 DIAGNOSIS — I10 ESSENTIAL HYPERTENSION: ICD-10-CM

## 2023-11-24 RX ORDER — CLONIDINE 0.3 MG/24H
1 PATCH, EXTENDED RELEASE TRANSDERMAL WEEKLY
Qty: 4 PATCH | Refills: 0 | Status: SHIPPED | OUTPATIENT
Start: 2023-11-24

## 2023-11-24 RX ORDER — EZETIMIBE 10 MG/1
10 TABLET ORAL DAILY
Qty: 30 TABLET | Refills: 0 | Status: SHIPPED | OUTPATIENT
Start: 2023-11-24 | End: 2024-05-22

## 2023-12-04 DIAGNOSIS — N18.30 BENIGN HYPERTENSION WITH CHRONIC KIDNEY DISEASE, STAGE III (HCC): ICD-10-CM

## 2023-12-04 DIAGNOSIS — I12.9 BENIGN HYPERTENSION WITH CHRONIC KIDNEY DISEASE, STAGE III (HCC): ICD-10-CM

## 2023-12-04 DIAGNOSIS — E78.2 MIXED HYPERLIPIDEMIA: ICD-10-CM

## 2023-12-04 DIAGNOSIS — Z79.4 TYPE 2 DIABETES MELLITUS WITHOUT COMPLICATION, WITH LONG-TERM CURRENT USE OF INSULIN (HCC): ICD-10-CM

## 2023-12-04 DIAGNOSIS — E11.9 TYPE 2 DIABETES MELLITUS WITHOUT COMPLICATION, WITH LONG-TERM CURRENT USE OF INSULIN (HCC): ICD-10-CM

## 2023-12-04 RX ORDER — PRAVASTATIN SODIUM 40 MG
TABLET ORAL
Qty: 90 TABLET | Refills: 0 | Status: SHIPPED | OUTPATIENT
Start: 2023-12-04

## 2023-12-04 RX ORDER — LINAGLIPTIN 5 MG/1
5 TABLET, FILM COATED ORAL DAILY
Qty: 30 TABLET | Refills: 0 | Status: SHIPPED | OUTPATIENT
Start: 2023-12-04

## 2023-12-04 RX ORDER — CARVEDILOL 12.5 MG/1
12.5 TABLET ORAL 2 TIMES DAILY
Qty: 60 TABLET | Refills: 0 | Status: SHIPPED | OUTPATIENT
Start: 2023-12-04

## 2023-12-04 RX ORDER — LOSARTAN POTASSIUM 25 MG/1
25 TABLET ORAL 2 TIMES DAILY
Qty: 180 TABLET | Refills: 3 | Status: SHIPPED | OUTPATIENT
Start: 2023-12-04

## 2023-12-29 ENCOUNTER — OFFICE VISIT (OUTPATIENT)
Dept: FAMILY MEDICINE CLINIC | Facility: CLINIC | Age: 83
End: 2023-12-29
Payer: COMMERCIAL

## 2023-12-29 ENCOUNTER — APPOINTMENT (OUTPATIENT)
Dept: LAB | Facility: CLINIC | Age: 83
End: 2023-12-29
Payer: COMMERCIAL

## 2023-12-29 VITALS
SYSTOLIC BLOOD PRESSURE: 122 MMHG | OXYGEN SATURATION: 98 % | TEMPERATURE: 97.5 F | DIASTOLIC BLOOD PRESSURE: 80 MMHG | BODY MASS INDEX: 32.99 KG/M2 | HEIGHT: 70 IN | WEIGHT: 230.4 LBS | HEART RATE: 78 BPM | RESPIRATION RATE: 18 BRPM

## 2023-12-29 DIAGNOSIS — Z79.4 TYPE 2 DIABETES MELLITUS WITHOUT COMPLICATION, WITH LONG-TERM CURRENT USE OF INSULIN (HCC): Primary | ICD-10-CM

## 2023-12-29 DIAGNOSIS — N18.32 STAGE 3B CHRONIC KIDNEY DISEASE (HCC): ICD-10-CM

## 2023-12-29 DIAGNOSIS — E78.2 MIXED HYPERLIPIDEMIA: ICD-10-CM

## 2023-12-29 DIAGNOSIS — E11.65 TYPE 2 DIABETES MELLITUS WITH HYPERGLYCEMIA, WITHOUT LONG-TERM CURRENT USE OF INSULIN (HCC): ICD-10-CM

## 2023-12-29 DIAGNOSIS — E09.22: ICD-10-CM

## 2023-12-29 DIAGNOSIS — N18.4: ICD-10-CM

## 2023-12-29 DIAGNOSIS — E11.9 TYPE 2 DIABETES MELLITUS WITHOUT COMPLICATION, WITH LONG-TERM CURRENT USE OF INSULIN (HCC): Primary | ICD-10-CM

## 2023-12-29 DIAGNOSIS — N18.4 BENIGN HYPERTENSION WITH CHRONIC KIDNEY DISEASE, STAGE IV (HCC): ICD-10-CM

## 2023-12-29 DIAGNOSIS — I12.9 BENIGN HYPERTENSION WITH CHRONIC KIDNEY DISEASE, STAGE IV (HCC): ICD-10-CM

## 2023-12-29 PROBLEM — L53.1 ERYTHEMA ANNULARE: Status: RESOLVED | Noted: 2023-04-27 | Resolved: 2023-12-29

## 2023-12-29 LAB
ALBUMIN SERPL BCP-MCNC: 4.1 G/DL (ref 3.5–5)
ANION GAP SERPL CALCULATED.3IONS-SCNC: 11 MMOL/L
BUN SERPL-MCNC: 43 MG/DL (ref 5–25)
CALCIUM SERPL-MCNC: 9.4 MG/DL (ref 8.4–10.2)
CHLORIDE SERPL-SCNC: 107 MMOL/L (ref 96–108)
CO2 SERPL-SCNC: 24 MMOL/L (ref 21–32)
CREAT SERPL-MCNC: 1.72 MG/DL (ref 0.6–1.3)
CREAT UR-MCNC: 108.4 MG/DL
GFR SERPL CREATININE-BSD FRML MDRD: 36 ML/MIN/1.73SQ M
GLUCOSE SERPL-MCNC: 138 MG/DL (ref 65–140)
MAGNESIUM SERPL-MCNC: 2 MG/DL (ref 1.9–2.7)
PHOSPHATE SERPL-MCNC: 3.6 MG/DL (ref 2.3–4.1)
POTASSIUM SERPL-SCNC: 4 MMOL/L (ref 3.5–5.3)
PROT UR-MCNC: 25 MG/DL
PROT/CREAT UR: 0.23 MG/G{CREAT} (ref 0–0.1)
SODIUM SERPL-SCNC: 142 MMOL/L (ref 135–147)

## 2023-12-29 PROCEDURE — 84156 ASSAY OF PROTEIN URINE: CPT

## 2023-12-29 PROCEDURE — 36415 COLL VENOUS BLD VENIPUNCTURE: CPT

## 2023-12-29 PROCEDURE — 99214 OFFICE O/P EST MOD 30 MIN: CPT | Performed by: NURSE PRACTITIONER

## 2023-12-29 PROCEDURE — 82570 ASSAY OF URINE CREATININE: CPT

## 2023-12-29 PROCEDURE — 83735 ASSAY OF MAGNESIUM: CPT

## 2023-12-29 PROCEDURE — 80069 RENAL FUNCTION PANEL: CPT

## 2023-12-29 NOTE — PROGRESS NOTES
Assessment/Plan:    1. Type 2 diabetes mellitus without complication, with long-term current use of insulin ([facility])  Comments:  Managed by endocrine  Orders:  -     Hemoglobin A1C; Future; Expected date: 04/27/2024  -     Albumin / creatinine urine ratio; Future; Expected date: 04/27/2024  -     CBC and differential; Future; Expected date: 04/27/2024    2. Mixed hyperlipidemia  -     Lipid Panel with Direct LDL reflex; Future; Expected date: 04/27/2024    3. Benign hypertension with chronic kidney disease, stage IV ([facility])  Comments:  Blood pressure controlled    4. Type 2 diabetes mellitus with hyperglycemia, without long-term current use of insulin ([facility])    5. 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 ([facility])  Comments:  Managed by nephrology        The case discussed with patient using patient centered shared decision making.The patient was counseled regarding instructions for management,-- risk factor reductions,-- prognosis,-- impressions,-- risks and benefits of treatment options,-- importance of compliance with treatment. I have reviewed the instructions with the patient, answering all questions to his satisfaction.    Patient clinically  stable  Will follow along with specialty  Encouraged regular follow-up with podiatry in setting of his severe pes planus and risk of DM ulcers      Patient's blood pressure is controlled. Cardiovascular risk factors include: advanced age (older than 55 for men, 65 for women), diabetes mellitus, dyslipidemia, hypertension, male gender, obesity (BMI >= 30 kg/m2), and sedentary lifestyle. Current plan of care: continue current treatment regimen, dietary sodium restriction, and weight loss. Reviewed risks of hypertension and principles of treatment. Blood pressure goal <140/90.    Diabetes currently is controlled     Goal A1C: <7.0%  Goal Blood pressure: <140/90  Goal LDL: <70 mg/dL    Most recent lab testing:   Results from last 6 Months   Lab Units  11/02/23  1122   HEMOGLOBIN A1C % 7.2*   LDL CALC mg/dL 72   CREATININE mg/dL 1.65*   EGFR ml/min/1.73sq m 38   MICROALB/CREAT RATIO mg/g creatinine 200*           Return in 6 months (on 6/29/2024) for Diabetes follow-up.    I have spent a total time of 30 minutes on 12/29/23 in caring for this patient including Diagnostic results, Prognosis, Risks and benefits of tx options, Instructions for management, Patient and family education, Importance of tx compliance, Risk factor reductions, Impressions, Counseling / Coordination of care, Documenting in the medical record, Reviewing / ordering tests, medicine, procedures  , and Obtaining or reviewing history  .    Subjective:      Patient ID: Jeremie Adams is a 83 y.o. male.    Chief Complaint   Patient presents with    New Patient Visit     Establish care        83-year-old male with history of diabetes, chronic kidney disease, dyslipidemia presents to [facility]  Is managed by endocrine, cardiology, nephrology regularly    Given his usual state of health  Seeing nephro next week  Testing utd  Has no c/o today      Reviewed medical history in detail. Changes to medical record changed where appropriate. Reviewed medications. Patient taking as prescribed. Tolerating well. Denies Side effects. Reviewed recent visits with specialists co-managing chronic conditions.     The following portions of the patient's history were reviewed and updated as appropriate: allergies, current medications, past family history, past medical history, past social history, past surgical history and problem list.    Review of Systems   Constitutional:  Negative for fatigue, fever and unexpected weight change.   HENT:  Negative for trouble swallowing.    Respiratory:  Negative for cough and shortness of breath.    Cardiovascular:  Positive for leg swelling. Negative for chest pain and palpitations.   Gastrointestinal:  Negative for abdominal pain and blood in stool.   Endocrine: Negative.     Genitourinary:  Negative for decreased urine volume, difficulty urinating and hematuria.   Musculoskeletal:  Positive for arthralgias and gait problem (Chronic foot pain).   Skin:  Negative for pallor.   Neurological:  Negative for dizziness, syncope and weakness.   Hematological:  Does not bruise/bleed easily.   Psychiatric/Behavioral:  Negative for confusion.          Current Outpatient Medications   Medication Sig Dispense Refill    aspirin 325 mg tablet Take 325 mg by mouth daily      Biotin 5 MG TABS Take by mouth daily      carvedilol (COREG) 12.5 mg tablet Take 1 tablet (12.5 mg total) by mouth 2 (two) times a day 60 tablet 0    cholecalciferol (VITAMIN D3) 1,000 units tablet Take 1,000 Units by mouth 2 (two) times a day      cloNIDine (CATAPRES-TTS-3) 0.3 mg/24 hr Place 1 patch (0.3 mg total) on the skin over 7 days once a week 4 patch 0    co-enzyme Q-10 30 MG capsule Take 100 mg by mouth daily       ezetimibe (ZETIA) 10 mg tablet Take 1 tablet (10 mg total) by mouth daily 30 tablet 0    losartan (COZAAR) 25 mg tablet Take 1 tablet (25 mg total) by mouth 2 (two) times a day 180 tablet 3    LUTEIN-ZEAXANTHIN-BILBERRY PO Take by mouth 2 (two) times a day      milk thistle 175 MG tablet Take 175 mg by mouth daily      Misc Natural Products (OSTEO BI-FLEX TRIPLE STRENGTH PO) Take 1 tablet by mouth 2 (two) times a day      multivitamin (THERAGRAN) TABS Take 1 tablet by mouth daily      Omega-3 Fatty Acids (FISH OIL) 1,000 mg Take 1,000 mg by mouth daily      pravastatin (PRAVACHOL) 40 mg tablet TAKE ONE TABLET BY MOUTH EVERY DAY 90 tablet 0    spironolactone (ALDACTONE) 25 mg tablet TAKE ONE TABLET BY MOUTH EVERY OTHER DAY 45 tablet 3    Taurine 500 MG CAPS Take by mouth daily      thiamine (VITAMIN B1) 100 mg tablet Take 100 mg by mouth daily      torsemide (DEMADEX) 10 mg tablet TAKE ONE AND A HALF TABLETS BY MOUTH EVERY  tablet 2    Tradjenta 5 MG TABS Take 5 mg by mouth daily 30 tablet 0    TURMERIC  "PO Take by mouth        vitamin E 100 UNIT capsule Take 100 Units by mouth daily        Choline Fenofibrate (Fenofibric Acid) 135 MG CPDR TAKE ONE CAPSULE BY MOUTH EVERY DAY (Patient not taking: Reported on 6/15/2023) 30 capsule 0    GLUCOSAMINE-CHONDROITIN PO Take 2 tablets by mouth daily  (Patient not taking: Reported on 12/22/2022)       No current facility-administered medications for this visit.       Patient Active Problem List   Diagnosis    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 ([facility])    Isolated proteinuria    Benign hypertension with chronic kidney disease, stage IV ([facility])    Chronic kidney disease-mineral and bone disorder    Resistant hypertension    Type 2 diabetes mellitus with hyperglycemia, without long-term current use of insulin ([facility])    Heart murmur    Severe obesity with body mass index (BMI) of 35.0 to 35.9 and comorbidity     Mixed hyperlipidemia    Lower extremity edema       Objective:    /80 (BP Location: Left arm, Patient Position: Sitting, Cuff Size: Large)   Pulse 78   Temp 97.5 °F (36.4 °C) (Tympanic)   Resp 18   Ht 5' 10\" (1.778 m)   Wt 105 kg (230 lb 6.4 oz)   SpO2 98%   BMI 33.06 kg/m²        Physical Exam  Vitals and nursing note reviewed.   Constitutional:       General: He is not in acute distress.     Appearance: Normal appearance. He is obese.   HENT:      Head: Normocephalic and atraumatic.   Neck:      Vascular: No carotid bruit.   Cardiovascular:      Rate and Rhythm: Normal rate and regular rhythm.      Pulses: Normal pulses.      Heart sounds: Murmur heard.   Pulmonary:      Effort: Pulmonary effort is normal.      Breath sounds: Normal breath sounds. No wheezing or rales.   Abdominal:      General: Bowel sounds are normal.      Palpations: Abdomen is soft.      Tenderness: There is no abdominal tenderness.   Musculoskeletal:      Right lower leg: Edema present.      Left lower leg: Edema present.   Lymphadenopathy:      Cervical: No " cervical adenopathy.   Skin:     General: Skin is warm and dry.      Coloration: Skin is not pale.   Neurological:      General: No focal deficit present.      Mental Status: He is alert.      Motor: No weakness.      Gait: Gait abnormal (Antalgic gait).   Psychiatric:         Mood and Affect: Mood normal.         Behavior: Behavior normal.                JEOVANY Almodovar

## 2023-12-29 NOTE — PROGRESS NOTES
Diabetic Foot Exam    Patient's shoes and socks removed.    Right Foot/Ankle   Right Foot Inspection  Skin Exam: skin normal. Skin not intact, no dry skin, no warmth, no callus, no erythema, no maceration, no abnormal color, no pre-ulcer, no ulcer and no callus.     Toe Exam: ROM and strength within normal limits and right toe deformity (pes planus b/l). No swelling, no tenderness and erythema    Sensory   Vibration: intact  Proprioception: intact  Monofilament testing: intact    Vascular  Capillary refills: < 3 seconds  The right DP pulse is 2+. The right PT pulse is 2+.     Left Foot/Ankle  Left Foot Inspection  Skin Exam: skin normal. Skin not intact, no dry skin, no warmth, no erythema, no maceration, normal color, no pre-ulcer, no ulcer and no callus.     Toe Exam: ROM and strength within normal limits. No swelling, no tenderness, no erythema and no left toe deformity.     Sensory   Vibration: intact  Proprioception: intact  Monofilament testing: intact    Vascular  Capillary refills: < 3 seconds  The left DP pulse is 2+. The left PT pulse is 2+.     Assign Risk Category  Deformity present  No loss of protective sensation  No weak pulses  Risk: 0

## 2024-01-02 ENCOUNTER — OFFICE VISIT (OUTPATIENT)
Dept: NEPHROLOGY | Facility: CLINIC | Age: 84
End: 2024-01-02
Payer: COMMERCIAL

## 2024-01-02 VITALS
HEART RATE: 70 BPM | SYSTOLIC BLOOD PRESSURE: 138 MMHG | WEIGHT: 233.4 LBS | OXYGEN SATURATION: 97 % | HEIGHT: 70 IN | BODY MASS INDEX: 33.41 KG/M2 | DIASTOLIC BLOOD PRESSURE: 74 MMHG

## 2024-01-02 DIAGNOSIS — I12.9 BENIGN HYPERTENSION WITH CHRONIC KIDNEY DISEASE, STAGE IV (HCC): ICD-10-CM

## 2024-01-02 DIAGNOSIS — N18.32 STAGE 3B CHRONIC KIDNEY DISEASE (HCC): Primary | ICD-10-CM

## 2024-01-02 DIAGNOSIS — R60.0 LOWER EXTREMITY EDEMA: ICD-10-CM

## 2024-01-02 DIAGNOSIS — N18.4 BENIGN HYPERTENSION WITH CHRONIC KIDNEY DISEASE, STAGE IV (HCC): ICD-10-CM

## 2024-01-02 DIAGNOSIS — N18.9 CHRONIC KIDNEY DISEASE-MINERAL AND BONE DISORDER: ICD-10-CM

## 2024-01-02 DIAGNOSIS — E11.65 TYPE 2 DIABETES MELLITUS WITH HYPERGLYCEMIA, WITHOUT LONG-TERM CURRENT USE OF INSULIN (HCC): ICD-10-CM

## 2024-01-02 DIAGNOSIS — E83.9 CHRONIC KIDNEY DISEASE-MINERAL AND BONE DISORDER: ICD-10-CM

## 2024-01-02 DIAGNOSIS — M89.9 CHRONIC KIDNEY DISEASE-MINERAL AND BONE DISORDER: ICD-10-CM

## 2024-01-02 DIAGNOSIS — N06.9 ISOLATED PROTEINURIA WITH MORPHOLOGIC LESION: ICD-10-CM

## 2024-01-02 PROCEDURE — 99214 OFFICE O/P EST MOD 30 MIN: CPT | Performed by: INTERNAL MEDICINE

## 2024-01-02 NOTE — PROGRESS NOTES
NEPHROLOGY OFFICE PROGRESS NOTE   Jeremie Adams 83 y.o. male MRN: [id number]  DATE: 01/02/24  Reason for visit: Continued evaluation and management of CKD.     ASSESSMENT & PLAN:  Chronic kidney disease, stage IIIB  Lance's baseline creatinine was 1.6 to 2.0 but had gone up to 2.3 in early 2023.   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   Over the past 6 months, his creatinine has settled at around 1.6 to 1.8.   Stable renal function - SCr 1.72 in Dec 2023.   Completed CKD education.  He is currently on Losartan and Spironolactone.   Treatment: good BP control and glycemic control.   We discussed use of an SGLT2i to delay disease progression - he is worried about side effects and would like to defer this for now.     Proteinuria:  UPC ratio is down to 0.23.   Continue Losartan 25 mg BID.   Continue Spironolactone 25 mg QOD.     Hypertension:  Home BP readings are at goal (<130/80)  Current regimen: Torsemide 10 mg OD, Clonidine 0.3 mg patch, Carvedilol 12.5 mg BID, Spironolactone 25 mg QOD, Losartan to 25 mg BID.   Increase Torsemide to 15 mg daily as he has edema.   Check BMP in 1 month.   No evidence of hyperaldosteronism.    Mineral and bone disease:  PTH is at goal.  22.7 May 2023.    Ca and phos are at goal.   Vitamin D level is at goal.  30.3 in May 2023. Continue Vit D to 2000 units daily.   Recheck PTH and Vitamin D.     Diabetes mellitus:  Diabetic management is deferred to endocrinology or PCP.  Consider SGLT2 inhibitors in the near future.     Hyperlipidemia:  On pravastatin and ezetimibe.   Follow up with PCP.     Patient Instructions   You have chronic kidney disease (CKD) and your kidney function is stable.   We may consider adding Farxiga or Jardiance in the future.   Increase Torsemide to 15 mg daily.   Check BMP in 3 months.   Follow up in 6 months - please obtain blood work 1-2 weeks prior to the appointment.     Please check your BP twice daily for 1 week and bring a log with your next  "visit.   Please avoid taking NSAIDs (Ibuprofen, Motrin, Aleve, Advil, Naproxen, Celebrex, etc.)  Make sure you are eating healthy and have adequate exercise.     SUBJECTIVE / INTERVAL HISTORY:  Lance was last seen in the office in May 2023.   There have been no recent hospitalizations or ER visits.  His weight has plateaued and he has lost roughly about 20 pounds altogether.  Home BP is 120s/80s when checked every few days - this range is the best its been for some time.   He has some leg and ankle edema.   He is seeing podiatry.     BP cuff calibration 1/2/24  Home BP cuff (wrist) 144/65  Office BP cuff 138/74     PMH/PSH: HTN, DM, HLP, CAD, LEONORA on CPAP, leg edema, BPH, DJD, SBO, macular degeneration, tonsillectomy, R inguinal hernia, retinal surgery.      Previous work up:   November 1, 2019:  Aldosterone 6, plasma renin activity 0.22, UPC ratio 0.85, metanephrines 34, normetanephrines 83, TSH 1.53     10/25/19 Renal US: R 12.9 cm, L 14.3 cm, bilateral cortical cysts.  No hydronephrosis.    ALLERGIES: No Known Allergies    REVIEW OF SYSTEMS:  Review of Systems   Constitutional:  Positive for fatigue. Negative for appetite change, chills and fever.   Respiratory:  Negative for cough and shortness of breath.    Cardiovascular:  Negative for chest pain and leg swelling.   Gastrointestinal:  Negative for abdominal pain, diarrhea, nausea and vomiting.   Genitourinary:  Negative for hematuria.   Musculoskeletal:  Positive for back pain. Negative for arthralgias.   Neurological:  Negative for dizziness and light-headedness.     OBJECTIVE:  /84 (BP Location: Left arm, Patient Position: Sitting, Cuff Size: Large)   Pulse 70   Ht 5' 10\" (1.778 m)   Wt 106 kg (233 lb 6.4 oz)   SpO2 97%   BMI 33.49 kg/m²   Current Weight: Weight - Scale: 106 kg (233 lb 6.4 oz) Body mass index is 33.49 kg/m².  Physical Exam  Constitutional:       General: He is not in acute distress.     Appearance: Normal appearance. He is " well-developed. He is obese. He is not ill-appearing, toxic-appearing or diaphoretic.   HENT:      Head: Normocephalic and atraumatic.   Eyes:      General: No scleral icterus.     Conjunctiva/sclera: Conjunctivae normal.   Neck:      Vascular: No JVD.   Cardiovascular:      Rate and Rhythm: Normal rate and regular rhythm.      Heart sounds: Normal heart sounds.   Pulmonary:      Effort: Pulmonary effort is normal. No respiratory distress.      Breath sounds: Normal breath sounds.   Abdominal:      General: Bowel sounds are normal.      Palpations: Abdomen is soft.   Musculoskeletal:      Cervical back: Neck supple.      Comments: Bilateral LE edema.    Skin:     General: Skin is warm and dry.   Neurological:      Mental Status: He is alert and oriented to person, place, and time.   Psychiatric:         Behavior: Behavior normal.         Judgment: Judgment normal.       Medications:  Current Outpatient Medications:     aspirin 325 mg tablet, Take 325 mg by mouth daily, Disp: , Rfl:     Biotin 5 MG TABS, Take by mouth daily, Disp: , Rfl:     carvedilol (COREG) 12.5 mg tablet, Take 1 tablet (12.5 mg total) by mouth 2 (two) times a day, Disp: 60 tablet, Rfl: 0    cholecalciferol (VITAMIN D3) 1,000 units tablet, Take 1,000 Units by mouth 2 (two) times a day, Disp: , Rfl:     cloNIDine (CATAPRES-TTS-3) 0.3 mg/24 hr, Place 1 patch (0.3 mg total) on the skin over 7 days once a week, Disp: 4 patch, Rfl: 0    co-enzyme Q-10 30 MG capsule, Take 100 mg by mouth daily , Disp: , Rfl:     ezetimibe (ZETIA) 10 mg tablet, Take 1 tablet (10 mg total) by mouth daily, Disp: 30 tablet, Rfl: 0    GLUCOSAMINE-CHONDROITIN PO, Take 2 tablets by mouth daily, Disp: , Rfl:     losartan (COZAAR) 25 mg tablet, Take 1 tablet (25 mg total) by mouth 2 (two) times a day, Disp: 180 tablet, Rfl: 3    LUTEIN-ZEAXANTHIN-BILBERRY PO, Take by mouth 2 (two) times a day, Disp: , Rfl:     milk thistle 175 MG tablet, Take 175 mg by mouth daily, Disp: , Rfl:      Misc Natural Products (OSTEO BI-FLEX TRIPLE STRENGTH PO), Take 1 tablet by mouth 2 (two) times a day, Disp: , Rfl:     multivitamin (THERAGRAN) TABS, Take 1 tablet by mouth daily, Disp: , Rfl:     Omega-3 Fatty Acids (FISH OIL) 1,000 mg, Take 1,000 mg by mouth 2 (two) times a day, Disp: , Rfl:     pravastatin (PRAVACHOL) 40 mg tablet, TAKE ONE TABLET BY MOUTH EVERY DAY, Disp: 90 tablet, Rfl: 0    spironolactone (ALDACTONE) 25 mg tablet, TAKE ONE TABLET BY MOUTH EVERY OTHER DAY, Disp: 45 tablet, Rfl: 3    Taurine 500 MG CAPS, Take by mouth daily, Disp: , Rfl:     thiamine (VITAMIN B1) 100 mg tablet, Take 100 mg by mouth daily, Disp: , Rfl:     torsemide (DEMADEX) 10 mg tablet, TAKE ONE AND A HALF TABLETS BY MOUTH EVERY DAY (Patient taking differently: 5 mg 2 (two) times a day), Disp: 135 tablet, Rfl: 2    Tradjenta 5 MG TABS, Take 5 mg by mouth daily, Disp: 30 tablet, Rfl: 0    TURMERIC PO, Take by mouth  , Disp: , Rfl:     vitamin E 100 UNIT capsule, Take 100 Units by mouth daily  , Disp: , Rfl:     Choline Fenofibrate (Fenofibric Acid) 135 MG CPDR, TAKE ONE CAPSULE BY MOUTH EVERY DAY (Patient not taking: Reported on 6/15/2023), Disp: 30 capsule, Rfl: 0    Laboratory Results:  Results for orders placed or performed in visit on 12/29/23   Magnesium   Result Value Ref Range    Magnesium 2.0 1.9 - 2.7 mg/dL   Renal function panel   Result Value Ref Range    Albumin 4.1 3.5 - 5.0 g/dL    Calcium 9.4 8.4 - 10.2 mg/dL    Phosphorus 3.6 2.3 - 4.1 mg/dL    Glucose 138 65 - 140 mg/dL    BUN 43 (H) 5 - 25 mg/dL    Creatinine 1.72 (H) 0.60 - 1.30 mg/dL    Sodium 142 135 - 147 mmol/L    Potassium 4.0 3.5 - 5.3 mmol/L    Chloride 107 96 - 108 mmol/L    CO2 24 21 - 32 mmol/L    ANION GAP 11 mmol/L    eGFR 36 ml/min/1.73sq m   Protein / creatinine ratio, urine   Result Value Ref Range    Creatinine, Ur 108.4 Reference range not established. mg/dL    Protein Urine Random 25 Reference range not established. mg/dL    Prot/Creat  Ratio, Ur 0.23 (H) 0.00 - 0.10

## 2024-01-02 NOTE — PATIENT INSTRUCTIONS
You have chronic kidney disease (CKD) and your kidney function is stable.   We may consider adding Farxiga or Jardiance in the future.   Increase Torsemide to 15 mg daily.   Check BMP in 3 months.   Follow up in 6 months - please obtain blood work 1-2 weeks prior to the appointment.     Please check your BP twice daily for 1 week and bring a log with your next visit.   Please avoid taking NSAIDs (Ibuprofen, Motrin, Aleve, Advil, Naproxen, Celebrex, etc.)  Make sure you are eating healthy and have adequate exercise.

## 2024-01-03 DIAGNOSIS — E78.2 MIXED HYPERLIPIDEMIA: ICD-10-CM

## 2024-01-03 DIAGNOSIS — Z79.4 TYPE 2 DIABETES MELLITUS WITHOUT COMPLICATION, WITH LONG-TERM CURRENT USE OF INSULIN (HCC): ICD-10-CM

## 2024-01-03 DIAGNOSIS — E11.9 TYPE 2 DIABETES MELLITUS WITHOUT COMPLICATION, WITH LONG-TERM CURRENT USE OF INSULIN (HCC): ICD-10-CM

## 2024-01-03 DIAGNOSIS — I10 ESSENTIAL HYPERTENSION: ICD-10-CM

## 2024-01-04 RX ORDER — CARVEDILOL 12.5 MG/1
12.5 TABLET ORAL 2 TIMES DAILY
Qty: 60 TABLET | Refills: 5 | Status: SHIPPED | OUTPATIENT
Start: 2024-01-04

## 2024-01-04 RX ORDER — LINAGLIPTIN 5 MG/1
5 TABLET, FILM COATED ORAL DAILY
Qty: 30 TABLET | Refills: 5 | Status: SHIPPED | OUTPATIENT
Start: 2024-01-04

## 2024-01-04 RX ORDER — EZETIMIBE 10 MG/1
10 TABLET ORAL DAILY
Qty: 90 TABLET | Refills: 1 | Status: SHIPPED | OUTPATIENT
Start: 2024-01-04 | End: 2024-07-02

## 2024-01-04 RX ORDER — CLONIDINE 0.3 MG/24H
1 PATCH, EXTENDED RELEASE TRANSDERMAL WEEKLY
Qty: 4 PATCH | Refills: 5 | Status: SHIPPED | OUTPATIENT
Start: 2024-01-04

## 2024-03-04 DIAGNOSIS — E78.2 MIXED HYPERLIPIDEMIA: ICD-10-CM

## 2024-03-04 RX ORDER — PRAVASTATIN SODIUM 40 MG
TABLET ORAL
Qty: 90 TABLET | Refills: 1 | Status: SHIPPED | OUTPATIENT
Start: 2024-03-04

## 2024-03-19 ENCOUNTER — APPOINTMENT (OUTPATIENT)
Dept: LAB | Facility: CLINIC | Age: 84
End: 2024-03-19
Payer: COMMERCIAL

## 2024-03-19 DIAGNOSIS — N18.32 STAGE 3B CHRONIC KIDNEY DISEASE (HCC): ICD-10-CM

## 2024-03-19 LAB
ANION GAP SERPL CALCULATED.3IONS-SCNC: 11 MMOL/L (ref 4–13)
BUN SERPL-MCNC: 55 MG/DL (ref 5–25)
CALCIUM SERPL-MCNC: 9.5 MG/DL (ref 8.4–10.2)
CHLORIDE SERPL-SCNC: 108 MMOL/L (ref 96–108)
CO2 SERPL-SCNC: 23 MMOL/L (ref 21–32)
CREAT SERPL-MCNC: 1.89 MG/DL (ref 0.6–1.3)
GFR SERPL CREATININE-BSD FRML MDRD: 32 ML/MIN/1.73SQ M
GLUCOSE P FAST SERPL-MCNC: 141 MG/DL (ref 65–99)
POTASSIUM SERPL-SCNC: 4.1 MMOL/L (ref 3.5–5.3)
SODIUM SERPL-SCNC: 142 MMOL/L (ref 135–147)

## 2024-03-19 PROCEDURE — 80048 BASIC METABOLIC PNL TOTAL CA: CPT

## 2024-03-19 PROCEDURE — 36415 COLL VENOUS BLD VENIPUNCTURE: CPT

## 2024-03-20 ENCOUNTER — TELEPHONE (OUTPATIENT)
Dept: NEPHROLOGY | Facility: CLINIC | Age: 84
End: 2024-03-20

## 2024-03-20 NOTE — TELEPHONE ENCOUNTER
----- Message from Jamie Barrera MD sent at 3/19/2024  6:25 PM EDT -----  Please inform patient that most recent labs (3/19/24) show that kidney function is stable.

## 2024-03-21 ENCOUNTER — OFFICE VISIT (OUTPATIENT)
Dept: ENDOCRINOLOGY | Facility: CLINIC | Age: 84
End: 2024-03-21
Payer: COMMERCIAL

## 2024-03-21 VITALS
WEIGHT: 233 LBS | DIASTOLIC BLOOD PRESSURE: 80 MMHG | BODY MASS INDEX: 33.36 KG/M2 | HEIGHT: 70 IN | OXYGEN SATURATION: 96 % | RESPIRATION RATE: 14 BRPM | SYSTOLIC BLOOD PRESSURE: 118 MMHG | HEART RATE: 63 BPM | TEMPERATURE: 98.5 F

## 2024-03-21 DIAGNOSIS — R53.83 OTHER FATIGUE: ICD-10-CM

## 2024-03-21 DIAGNOSIS — N18.4 BENIGN HYPERTENSION WITH CHRONIC KIDNEY DISEASE, STAGE IV (HCC): ICD-10-CM

## 2024-03-21 DIAGNOSIS — E11.65 TYPE 2 DIABETES MELLITUS WITH HYPERGLYCEMIA, WITHOUT LONG-TERM CURRENT USE OF INSULIN (HCC): Primary | ICD-10-CM

## 2024-03-21 DIAGNOSIS — I12.9 BENIGN HYPERTENSION WITH CHRONIC KIDNEY DISEASE, STAGE IV (HCC): ICD-10-CM

## 2024-03-21 DIAGNOSIS — E66.9 CLASS 1 OBESITY WITH SERIOUS COMORBIDITY AND BODY MASS INDEX (BMI) OF 33.0 TO 33.9 IN ADULT, UNSPECIFIED OBESITY TYPE: ICD-10-CM

## 2024-03-21 PROBLEM — E66.811 CLASS 1 OBESITY WITH SERIOUS COMORBIDITY AND BODY MASS INDEX (BMI) OF 33.0 TO 33.9 IN ADULT: Status: ACTIVE | Noted: 2021-07-19

## 2024-03-21 PROCEDURE — 99214 OFFICE O/P EST MOD 30 MIN: CPT | Performed by: INTERNAL MEDICINE

## 2024-03-21 PROCEDURE — 95250 CONT GLUC MNTR PHYS/QHP EQP: CPT | Performed by: INTERNAL MEDICINE

## 2024-03-21 NOTE — PROGRESS NOTES
"    Follow-up Patient Progress Note      CC:Type 2 diabetes     History of Present Illness:   83 yr male with Type 2 diabetes since 2008, CKD4 eGFR 25, low ALP, HTN, HLD, obesity BMI 35 and vitamin D deficiency. Last visit was 6/15/23.     Since last visit, weight is same. He made significant changes to diet and lifestyle and feels well.  Some improved energy is reported.        CGM data review::  Device: Stephon pro         dates:  8/17/2023-8/31/2023   usage: 100%   Av glu: 123 mg/dL                   SD:  mg/dL            CV: 15.6%       GMI:   %  TIR: 99%                     TAR: 1%                      TBR: 0%     Glycemic patters: Excellent control with normal glycemia throughout.  Hypoglycemia: No        Current meds:  Tradjenta 5mg daily     Opthamology: yes. Macular degeneration  Podiatry: No  Vaccination: Yes   Dental:  Pancreatitis: No     Ace/ARB: losartan  Statin: pravastain  Thyroid issues: No     6/27/23 DXA: T Scores 1.5 LS, -0.7LTH, -1.4 LFN and -1.1 Lt forearm. 10 yr FRAX score 2.1% hip and 6.3% major osteoporotic fracture.     Physical Exam:  Body mass index is 33.43 kg/m².  /80 (BP Location: Left arm, Patient Position: Sitting)   Pulse 63   Temp 98.5 °F (36.9 °C) (Tympanic)   Resp 14   Ht 5' 10\" (1.778 m)   Wt 106 kg (233 lb)   SpO2 96%   BMI 33.43 kg/m²    Vitals:    03/21/24 1257   Weight: 106 kg (233 lb)        Physical Exam  Constitutional:       General: He is not in acute distress.     Appearance: He is well-developed.   HENT:      Head: Normocephalic and atraumatic.      Nose: Nose normal.   Eyes:      Conjunctiva/sclera: Conjunctivae normal.   Pulmonary:      Effort: Pulmonary effort is normal.   Abdominal:      General: There is no distension.   Musculoskeletal:      Cervical back: Normal range of motion and neck supple.   Skin:     Findings: No rash.      Comments: No icterus   Neurological:      Mental Status: He is alert and oriented to person, place, and time. "         Labs:   Lab Results   Component Value Date    HGBA1C 7.2 (H) 11/02/2023       Lab Results   Component Value Date    CYB3QUEQUILD 2.380 10/25/2022       Lab Results   Component Value Date    CREATININE 1.89 (H) 03/19/2024    CREATININE 1.72 (H) 12/29/2023    CREATININE 1.65 (H) 11/02/2023    BUN 55 (H) 03/19/2024    K 4.1 03/19/2024     03/19/2024    CO2 23 03/19/2024     eGFR   Date Value Ref Range Status   03/19/2024 32 ml/min/1.73sq m Final       Lab Results   Component Value Date    ALT 16 11/02/2023    AST 16 11/02/2023    ALKPHOS 48 11/02/2023       Lab Results   Component Value Date    CHOLESTEROL 147 11/02/2023    CHOLESTEROL 179 10/25/2022    CHOLESTEROL 160 07/16/2021     Lab Results   Component Value Date    HDL 42 11/02/2023    HDL 46 10/25/2022    HDL 42 07/16/2021     Lab Results   Component Value Date    TRIG 163 (H) 11/02/2023    TRIG 124 10/25/2022    TRIG 109 07/16/2021     Lab Results   Component Value Date    NONHDLC 105 11/02/2023    NONHDLC 133 10/25/2022    NONHDLC 118 07/16/2021         Assessment/Plan:    1. Type 2 diabetes mellitus with hyperglycemia, without long-term current use of insulin (HCC)  Assessment & Plan:  He has made lifestyle changes with diet and exercise but weight has plateaued.  Today we discussed options and we agreed to use a CGM pro to get accurate glycemic data. If glucose is <180mg/dL, there is no need to add medications for diabetes.  We agreed to try medical fitness training - may need further drop in BP meds.    I however would consider using a GLP1 agonist that should have weight loss, glycemic, BP, renal and CV benefits. We may consider this for 6 months or so.    Continue Tradjenta for now.  Follow up in 3 months.  Lab Results   Component Value Date    HGBA1C 7.2 (H) 11/02/2023       Orders:  -     Continous glucose monitoring casey placement; Future  -     Continous glucose monitoring casey intrepretation; Future  -     Ambulatory Referral to  Medical Fitness Exercise Specialist; Future  -     Hemoglobin A1C; Future  -     Continous glucose monitoring casey placement    2. Class 1 obesity with serious comorbidity and body mass index (BMI) of 33.0 to 33.9 in adult, unspecified obesity type    3. Benign hypertension with chronic kidney disease, stage IV (HCC)  Assessment & Plan:  He reports relative hypotension. BP today was below goal. He also feels fatigue.  Advised to reduce losartan to 25mg qdaily. Continue other BP meds.  In future may need clonidine dose reduction but will defer to nephrology.  May consider SGLT2i but will need close monitoring with multiple other meds.    Lab Results   Component Value Date    EGFR 32 03/19/2024    EGFR 36 12/29/2023    EGFR 38 11/02/2023    CREATININE 1.89 (H) 03/19/2024    CREATININE 1.72 (H) 12/29/2023    CREATININE 1.65 (H) 11/02/2023         4. Other fatigue  -     T4, free; Future  -     TSH, 3rd generation; Future          I have spent a total time of 32 minutes on 03/21/24 in caring for this patient including greater than 50% of this time was spent in counseling/coordination of care as listed above.       Discussed with the patient and all questioned fully answered. He will contact me with concerns.    Tony Bustos

## 2024-03-21 NOTE — LETTER
"March 21, 2024     Jamie Barrera MD  [address]  [address]  [place] [address]    Patient: Jeremie Adams   YOB: 1940   Date of Visit: 3/21/2024       Dear Dr. Barrera:    Thank you for referring Jeremie Adams to me for evaluation. Below are my notes for this consultation.    If you have questions, please do not hesitate to call me. I look forward to following your patient along with you.         Sincerely,        Tony Bustos MD        CC: No Recipients    Tony Bustos MD  3/21/2024  2:00 PM  Incomplete      Follow-up Patient Progress Note      CC:Type 2 diabetes     History of Present Illness:   83 yr male with Type 2 diabetes since 2008, CKD4 eGFR 25, low ALP, HTN, HLD, obesity BMI 35 and vitamin D deficiency. Last visit was 6/15/23.     Since last visit, weight is same. He made significant changes to diet and lifestyle and feels well.  Some improved energy is reported.        CGM data review::  Device: Stephon pro         dates:  8/17/2023-8/31/2023   usage: 100%   Av glu: 123 mg/dL                   SD:  mg/dL            CV: 15.6%       GMI:   %  TIR: 99%                     TAR: 1%                      TBR: 0%     Glycemic patters: Excellent control with normal glycemia throughout.  Hypoglycemia: No        Current meds:  Tradjenta 5mg daily     Opthamology: yes. Macular degeneration  Podiatry: No  Vaccination: Yes   Dental:  Pancreatitis: No     Ace/ARB: losartan  Statin: pravastain  Thyroid issues: No     6/27/23 DXA: T Scores 1.5 LS, -0.7LTH, -1.4 LFN and -1.1 Lt forearm. 10 yr FRAX score 2.1% hip and 6.3% major osteoporotic fracture.     Physical Exam:  Body mass index is 33.43 kg/m².  /80 (BP Location: Left arm, Patient Position: Sitting)   Pulse 63   Temp 98.5 °F (36.9 °C) (Tympanic)   Resp 14   Ht 5' 10\" (1.778 m)   Wt 106 kg (233 lb)   SpO2 96%   BMI 33.43 kg/m²    Vitals:    03/21/24 1257   Weight: 106 kg (233 lb)        Physical " Exam  Constitutional:       General: He is not in acute distress.     Appearance: He is well-developed.   HENT:      Head: Normocephalic and atraumatic.      Nose: Nose normal.   Eyes:      Conjunctiva/sclera: Conjunctivae normal.   Pulmonary:      Effort: Pulmonary effort is normal.   Abdominal:      General: There is no distension.   Musculoskeletal:      Cervical back: Normal range of motion and neck supple.   Skin:     Findings: No rash.      Comments: No icterus   Neurological:      Mental Status: He is alert and oriented to person, place, and time.         Labs:   Lab Results   Component Value Date    HGBA1C 7.2 (H) 11/02/2023       Lab Results   Component Value Date    VME4TCYQHCFC 2.380 10/25/2022       Lab Results   Component Value Date    CREATININE 1.89 (H) 03/19/2024    CREATININE 1.72 (H) 12/29/2023    CREATININE 1.65 (H) 11/02/2023    BUN 55 (H) 03/19/2024    K 4.1 03/19/2024     03/19/2024    CO2 23 03/19/2024     eGFR   Date Value Ref Range Status   03/19/2024 32 ml/min/1.73sq m Final       Lab Results   Component Value Date    ALT 16 11/02/2023    AST 16 11/02/2023    ALKPHOS 48 11/02/2023       Lab Results   Component Value Date    CHOLESTEROL 147 11/02/2023    CHOLESTEROL 179 10/25/2022    CHOLESTEROL 160 07/16/2021     Lab Results   Component Value Date    HDL 42 11/02/2023    HDL 46 10/25/2022    HDL 42 07/16/2021     Lab Results   Component Value Date    TRIG 163 (H) 11/02/2023    TRIG 124 10/25/2022    TRIG 109 07/16/2021     Lab Results   Component Value Date    NONHDLC 105 11/02/2023    NONHDLC 133 10/25/2022    NONHDLC 118 07/16/2021         Assessment/Plan:    1. Type 2 diabetes mellitus with hyperglycemia, without long-term current use of insulin ([facility])  Assessment & Plan:  He has made lifestyle changes with diet and exercise but weight has plateaued.  Today we discussed options and we agreed to use a CGM pro to get accurate glycemic data. If glucose is <180mg/dL, there is no need to  add medications for diabetes.  We agreed to try medical fitness training - may need further drop in BP meds.    I however would consider using a GLP1 agonist that should have weight loss, glycemic, BP, renal and CV benefits. We may consider this for 6 months or so.    Continue Tradjenta for now.  Follow up in 3 months.  Lab Results   Component Value Date    HGBA1C 7.2 (H) 11/02/2023       Orders:  -     Continous glucose monitoring casey placement; Future  -     Continous glucose monitoring casey intrepretation; Future  -     Ambulatory Referral to Medical Fitness Exercise Specialist; Future  -     Hemoglobin A1C; Future  -     Continous glucose monitoring casey placement    2. Class 1 obesity with serious comorbidity and body mass index (BMI) of 33.0 to 33.9 in adult, unspecified obesity type    3. Benign hypertension with chronic kidney disease, stage IV (HCC)  Assessment & Plan:  He reports relative hypotension. BP today was below goal. He also feels fatigue.  Advised to reduce losartan to 25mg qdaily. Continue other BP meds.  In future may need clonidine dose reduction but will defer to nephrology.  May consider SGLT2i but will need close monitoring with multiple other meds.    Lab Results   Component Value Date    EGFR 32 03/19/2024    EGFR 36 12/29/2023    EGFR 38 11/02/2023    CREATININE 1.89 (H) 03/19/2024    CREATININE 1.72 (H) 12/29/2023    CREATININE 1.65 (H) 11/02/2023         4. Other fatigue  -     T4, free; Future  -     TSH, 3rd generation; Future          I have spent a total time of 32 minutes on 03/21/24 in caring for this patient including greater than 50% of this time was spent in counseling/coordination of care as listed above.       Discussed with the patient and all questioned fully answered. He will contact me with concerns.    Tony Bustos MD  3/21/2024  1:41 PM  Sign when Signing Visit      Follow-up Patient Progress Note      CC:Type 2 diabetes     History of  "Present Illness:   83 yr male with Type 2 diabetes since 2008, CKD4 eGFR 25, low ALP, HTN, HLD, obesity BMI 35 and vitamin D deficiency. Last visit was 6/15/23.     Since last visit, weight is same. He made significant changes to diet and lifestyle and feels well.  Some improved energy is reported.        CGM data review::  Device: Stephon pro         dates:  8/17/2023-8/31/2023   usage: 100%   Av glu: 123 mg/dL                   SD:  mg/dL            CV: 15.6%       GMI:   %  TIR: 99%                     TAR: 1%                      TBR: 0%     Glycemic patters: Excellent control with normal glycemia throughout.  Hypoglycemia: No        Current meds:  Tradjenta 5mg daily     Opthamology: yes. Macular degeneration  Podiatry: No  Vaccination: Yes   Dental:  Pancreatitis: No     Ace/ARB: losartan  Statin: pravastain  Thyroid issues: No     6/27/23 DXA: T Scores 1.5 LS, -0.7LTH, -1.4 LFN and -1.1 Lt forearm. 10 yr FRAX score 2.1% hip and 6.3% major osteoporotic fracture.     Physical Exam:  Body mass index is 33.43 kg/m².  /80 (BP Location: Left arm, Patient Position: Sitting)   Pulse 63   Temp 98.5 °F (36.9 °C) (Tympanic)   Resp 14   Ht 5' 10\" (1.778 m)   Wt 106 kg (233 lb)   SpO2 96%   BMI 33.43 kg/m²    Vitals:    03/21/24 1257   Weight: 106 kg (233 lb)        Physical Exam  Constitutional:       General: He is not in acute distress.     Appearance: He is well-developed.   HENT:      Head: Normocephalic and atraumatic.      Nose: Nose normal.   Eyes:      Conjunctiva/sclera: Conjunctivae normal.   Pulmonary:      Effort: Pulmonary effort is normal.   Abdominal:      General: There is no distension.   Musculoskeletal:      Cervical back: Normal range of motion and neck supple.   Skin:     Findings: No rash.      Comments: No icterus   Neurological:      Mental Status: He is alert and oriented to person, place, and time.         Labs:   Lab Results   Component Value Date    HGBA1C 7.2 (H) 11/02/2023 "       Lab Results   Component Value Date    [id number] 2.380 10/25/2022       Lab Results   Component Value Date    CREATININE 1.89 (H) 03/19/2024    CREATININE 1.72 (H) 12/29/2023    CREATININE 1.65 (H) 11/02/2023    BUN 55 (H) 03/19/2024    K 4.1 03/19/2024     03/19/2024    CO2 23 03/19/2024     eGFR   Date Value Ref Range Status   03/19/2024 32 ml/min/1.73sq m Final       Lab Results   Component Value Date    ALT 16 11/02/2023    AST 16 11/02/2023    ALKPHOS 48 11/02/2023       Lab Results   Component Value Date    CHOLESTEROL 147 11/02/2023    CHOLESTEROL 179 10/25/2022    CHOLESTEROL 160 07/16/2021     Lab Results   Component Value Date    HDL 42 11/02/2023    HDL 46 10/25/2022    HDL 42 07/16/2021     Lab Results   Component Value Date    TRIG 163 (H) 11/02/2023    TRIG 124 10/25/2022    TRIG 109 07/16/2021     Lab Results   Component Value Date    NONHDLC 105 11/02/2023    NONHDLC 133 10/25/2022    NONHDLC 118 07/16/2021         Assessment/Plan:    1. Type 2 diabetes mellitus with hyperglycemia, without long-term current use of insulin (HCC)  -     Continous glucose monitoring casey placement; Future  -     Continous glucose monitoring csaey intrepretation; Future  -     Ambulatory Referral to Medical Fitness Exercise Specialist; Future  -     Hemoglobin A1C; Future    2. Class 1 obesity with serious comorbidity and body mass index (BMI) of 33.0 to 33.9 in adult, unspecified obesity type    3. Benign hypertension with chronic kidney disease, stage IV (HCC)    4. Diabetes mellitus type 2 in obese     5. Other fatigue  -     T4, free; Future  -     TSH, 3rd generation; Future          I have spent a total time of 32 minutes on 03/21/24 in caring for this patient including greater than 50% of this time was spent in counseling/coordination of care as listed above.       Discussed with the patient and all questioned fully answered. He will contact me with concerns.    Tony Bustos

## 2024-03-21 NOTE — ASSESSMENT & PLAN NOTE
He reports relative hypotension. BP today was below goal. He also feels fatigue.  Advised to reduce losartan to 25mg qdaily. Continue other BP meds.  In future may need clonidine dose reduction but will defer to nephrology.  May consider SGLT2i but will need close monitoring with multiple other meds.    Lab Results   Component Value Date    EGFR 32 03/19/2024    EGFR 36 12/29/2023    EGFR 38 11/02/2023    CREATININE 1.89 (H) 03/19/2024    CREATININE 1.72 (H) 12/29/2023    CREATININE 1.65 (H) 11/02/2023

## 2024-03-21 NOTE — PROGRESS NOTES
Continous glucose monitoring stephon placement     Date/Time  3/21/2024 1:51 PM     Performed by  Jaquelin Brennan LPN   Authorized by  Tony Bustos MD     Universal Protocol   Consent: Verbal consent obtained. Written consent obtained.  Consent given by: patient  Timeout called at: 3/21/2024 1:51 PM.  Patient understanding: patient states understanding of the procedure being performed  Patient identity confirmed: verbally with patient      Local anesthesia used: no     Anesthesia   Local anesthesia used: no     Sedation   Patient sedated: no        Specimen: no    Culture: no   Procedure Details   Procedure Notes: Stephon sensor placed on patients left arm  SN: [id number]  EXP: 6/30/24  Patient tolerance: patient tolerated the procedure well with no immediate complications

## 2024-03-21 NOTE — ASSESSMENT & PLAN NOTE
He has made lifestyle changes with diet and exercise but weight has plateaued.  Today we discussed options and we agreed to use a CGM pro to get accurate glycemic data. If glucose is <180mg/dL, there is no need to add medications for diabetes.  We agreed to try medical fitness training - may need further drop in BP meds.    I however would consider using a GLP1 agonist that should have weight loss, glycemic, BP, renal and CV benefits. We may consider this for 6 months or so.    Continue Tradjenta for now.  Follow up in 3 months.  Lab Results   Component Value Date    HGBA1C 7.2 (H) 11/02/2023

## 2024-03-25 ENCOUNTER — TELEPHONE (OUTPATIENT)
Dept: OTHER | Facility: HOSPITAL | Age: 84
End: 2024-03-25

## 2024-03-25 DIAGNOSIS — I10 ESSENTIAL HYPERTENSION: ICD-10-CM

## 2024-03-25 DIAGNOSIS — E78.2 MIXED HYPERLIPIDEMIA: ICD-10-CM

## 2024-03-25 RX ORDER — CLONIDINE 0.3 MG/24H
1 PATCH, EXTENDED RELEASE TRANSDERMAL WEEKLY
Start: 2024-03-25

## 2024-03-25 RX ORDER — CARVEDILOL 12.5 MG/1
6.25 TABLET ORAL 2 TIMES DAILY
Start: 2024-03-25

## 2024-03-25 NOTE — TELEPHONE ENCOUNTER
I called the patient and we spoke over the phone.   I was informed by Dr. Bustos that patient has been having fatigue and low BP readings.   Home BP readings have been occasionally in the SBP 90s [id number].   Recommend decrease Carvedilol to 6.25 mg BID and Clonidine to 0.2 mg patch.

## 2024-03-27 DIAGNOSIS — E11.65 TYPE 2 DIABETES MELLITUS WITH HYPERGLYCEMIA, WITHOUT LONG-TERM CURRENT USE OF INSULIN (HCC): Primary | ICD-10-CM

## 2024-04-08 ENCOUNTER — OFFICE VISIT (OUTPATIENT)
Dept: ENDOCRINOLOGY | Facility: CLINIC | Age: 84
End: 2024-04-08
Payer: COMMERCIAL

## 2024-04-08 DIAGNOSIS — E11.65 TYPE 2 DIABETES MELLITUS WITH HYPERGLYCEMIA, WITHOUT LONG-TERM CURRENT USE OF INSULIN (HCC): ICD-10-CM

## 2024-04-08 PROCEDURE — 95251 CONT GLUC MNTR ANALYSIS I&R: CPT | Performed by: INTERNAL MEDICINE

## 2024-04-08 NOTE — PROGRESS NOTES
Continous glucose monitoring stephon intrepretation     Date/Time  4/8/2024 1:53 PM     Performed by  Jaquelin Brennan LPN   Authorized by  Tony Bustos MD     Universal Protocol   Consent: Verbal consent obtained. Written consent obtained.  Consent given by: patient  Timeout called at: 4/8/2024 1:53 PM.  Patient understanding: patient states understanding of the procedure being performed  Patient identity confirmed: verbally with patient      Local anesthesia used: no     Anesthesia   Local anesthesia used: no     Sedation   Patient sedated: no        Specimen: no    Culture: no   Procedure Details   Procedure Notes: Stephon sensor removed from left arm  Patient tolerance: patient tolerated the procedure well with no immediate complications

## 2024-04-29 ENCOUNTER — TELEPHONE (OUTPATIENT)
Dept: NEPHROLOGY | Facility: CLINIC | Age: 84
End: 2024-04-29

## 2024-04-29 NOTE — TELEPHONE ENCOUNTER
Patient came into office today asking if its safe for him to take Baclofen 10mg as needed up to 4x a day for shoulder pain.

## 2024-05-01 NOTE — TELEPHONE ENCOUNTER
Yes, it is okay to take the Baclofen. However, I would recommend that he start off with a lower dose - 5 mg as needed up to 4x per day.

## 2024-05-01 NOTE — TELEPHONE ENCOUNTER
Message left on patient's VM that okay to take Baclofen per  but at lower dose of 5 mg as needed up to 4x daily.

## 2024-05-30 DIAGNOSIS — I12.9 BENIGN HYPERTENSION WITH CHRONIC KIDNEY DISEASE, STAGE III (HCC): ICD-10-CM

## 2024-05-30 DIAGNOSIS — N18.30 BENIGN HYPERTENSION WITH CHRONIC KIDNEY DISEASE, STAGE III (HCC): ICD-10-CM

## 2024-05-31 RX ORDER — SPIRONOLACTONE 25 MG/1
TABLET ORAL
Qty: 45 TABLET | Refills: 1 | Status: SHIPPED | OUTPATIENT
Start: 2024-05-31

## 2024-06-24 ENCOUNTER — LAB (OUTPATIENT)
Dept: LAB | Facility: CLINIC | Age: 84
End: 2024-06-24
Payer: COMMERCIAL

## 2024-06-24 DIAGNOSIS — Z79.4 TYPE 2 DIABETES MELLITUS WITHOUT COMPLICATION, WITH LONG-TERM CURRENT USE OF INSULIN (HCC): ICD-10-CM

## 2024-06-24 DIAGNOSIS — E78.2 MIXED HYPERLIPIDEMIA: ICD-10-CM

## 2024-06-24 DIAGNOSIS — E11.9 TYPE 2 DIABETES MELLITUS WITHOUT COMPLICATION, WITH LONG-TERM CURRENT USE OF INSULIN (HCC): ICD-10-CM

## 2024-06-24 DIAGNOSIS — E11.65 TYPE 2 DIABETES MELLITUS WITH HYPERGLYCEMIA, WITHOUT LONG-TERM CURRENT USE OF INSULIN (HCC): ICD-10-CM

## 2024-06-24 DIAGNOSIS — N18.32 STAGE 3B CHRONIC KIDNEY DISEASE (HCC): Primary | ICD-10-CM

## 2024-06-24 DIAGNOSIS — N18.32 STAGE 3B CHRONIC KIDNEY DISEASE (HCC): ICD-10-CM

## 2024-06-24 DIAGNOSIS — R53.83 OTHER FATIGUE: ICD-10-CM

## 2024-06-24 LAB
25(OH)D3 SERPL-MCNC: 44.6 NG/ML (ref 30–100)
ALBUMIN SERPL BCG-MCNC: 4.1 G/DL (ref 3.5–5)
ANION GAP SERPL CALCULATED.3IONS-SCNC: 10 MMOL/L (ref 4–13)
BASOPHILS # BLD AUTO: 0.04 THOUSANDS/ÂΜL (ref 0–0.1)
BASOPHILS NFR BLD AUTO: 0 % (ref 0–1)
BUN SERPL-MCNC: 38 MG/DL (ref 5–25)
CALCIUM SERPL-MCNC: 9.9 MG/DL (ref 8.4–10.2)
CHLORIDE SERPL-SCNC: 103 MMOL/L (ref 96–108)
CHOLEST SERPL-MCNC: 150 MG/DL
CO2 SERPL-SCNC: 25 MMOL/L (ref 21–32)
CREAT SERPL-MCNC: 2.27 MG/DL (ref 0.6–1.3)
CREAT UR-MCNC: 63.8 MG/DL
EOSINOPHIL # BLD AUTO: 0.16 THOUSAND/ÂΜL (ref 0–0.61)
EOSINOPHIL NFR BLD AUTO: 2 % (ref 0–6)
ERYTHROCYTE [DISTWIDTH] IN BLOOD BY AUTOMATED COUNT: 12.8 % (ref 11.6–15.1)
EST. AVERAGE GLUCOSE BLD GHB EST-MCNC: 157 MG/DL
GFR SERPL CREATININE-BSD FRML MDRD: 25 ML/MIN/1.73SQ M
GLUCOSE P FAST SERPL-MCNC: 151 MG/DL (ref 65–99)
HBA1C MFR BLD: 7.1 %
HCT VFR BLD AUTO: 37.2 % (ref 36.5–49.3)
HDLC SERPL-MCNC: 47 MG/DL
HGB BLD-MCNC: 12.1 G/DL (ref 12–17)
IMM GRANULOCYTES # BLD AUTO: 0.04 THOUSAND/UL (ref 0–0.2)
IMM GRANULOCYTES NFR BLD AUTO: 0 % (ref 0–2)
LDLC SERPL CALC-MCNC: 74 MG/DL (ref 0–100)
LYMPHOCYTES # BLD AUTO: 2.38 THOUSANDS/ÂΜL (ref 0.6–4.47)
LYMPHOCYTES NFR BLD AUTO: 25 % (ref 14–44)
MAGNESIUM SERPL-MCNC: 2.1 MG/DL (ref 1.9–2.7)
MCH RBC QN AUTO: 30.7 PG (ref 26.8–34.3)
MCHC RBC AUTO-ENTMCNC: 32.5 G/DL (ref 31.4–37.4)
MCV RBC AUTO: 94 FL (ref 82–98)
MICROALBUMIN UR-MCNC: 70.3 MG/L
MICROALBUMIN/CREAT 24H UR: 110 MG/G CREATININE (ref 0–30)
MONOCYTES # BLD AUTO: 0.76 THOUSAND/ÂΜL (ref 0.17–1.22)
MONOCYTES NFR BLD AUTO: 8 % (ref 4–12)
NEUTROPHILS # BLD AUTO: 6.04 THOUSANDS/ÂΜL (ref 1.85–7.62)
NEUTS SEG NFR BLD AUTO: 65 % (ref 43–75)
NRBC BLD AUTO-RTO: 0 /100 WBCS
PHOSPHATE SERPL-MCNC: 4.3 MG/DL (ref 2.3–4.1)
PLATELET # BLD AUTO: 154 THOUSANDS/UL (ref 149–390)
PMV BLD AUTO: 10.4 FL (ref 8.9–12.7)
POTASSIUM SERPL-SCNC: 4.4 MMOL/L (ref 3.5–5.3)
PTH-INTACT SERPL-MCNC: 14.8 PG/ML (ref 12–88)
RBC # BLD AUTO: 3.94 MILLION/UL (ref 3.88–5.62)
SODIUM SERPL-SCNC: 138 MMOL/L (ref 135–147)
T4 FREE SERPL-MCNC: 1.28 NG/DL (ref 0.61–1.12)
TRIGL SERPL-MCNC: 143 MG/DL
TSH SERPL DL<=0.05 MIU/L-ACNC: 2.16 UIU/ML (ref 0.45–4.5)
WBC # BLD AUTO: 9.42 THOUSAND/UL (ref 4.31–10.16)

## 2024-06-24 PROCEDURE — 80061 LIPID PANEL: CPT

## 2024-06-24 PROCEDURE — 83735 ASSAY OF MAGNESIUM: CPT

## 2024-06-24 PROCEDURE — 84439 ASSAY OF FREE THYROXINE: CPT

## 2024-06-24 PROCEDURE — 82043 UR ALBUMIN QUANTITATIVE: CPT

## 2024-06-24 PROCEDURE — 84443 ASSAY THYROID STIM HORMONE: CPT

## 2024-06-24 PROCEDURE — 85025 COMPLETE CBC W/AUTO DIFF WBC: CPT

## 2024-06-24 PROCEDURE — 82570 ASSAY OF URINE CREATININE: CPT

## 2024-06-24 PROCEDURE — 36415 COLL VENOUS BLD VENIPUNCTURE: CPT

## 2024-06-24 PROCEDURE — 83036 HEMOGLOBIN GLYCOSYLATED A1C: CPT

## 2024-06-24 PROCEDURE — 82306 VITAMIN D 25 HYDROXY: CPT

## 2024-06-24 PROCEDURE — 83970 ASSAY OF PARATHORMONE: CPT

## 2024-06-24 PROCEDURE — 80069 RENAL FUNCTION PANEL: CPT

## 2024-06-24 NOTE — RESULT ENCOUNTER NOTE
Labs reviewed.  Creatinine slightly above baseline.  Please call patient and inquire regarding any symptoms, including edema.  Please schedule patient for follow-up as he is due in July.  Please repeat BMP in 2 weeks.  Orders in epic.

## 2024-06-25 ENCOUNTER — TELEPHONE (OUTPATIENT)
Dept: NEPHROLOGY | Facility: CLINIC | Age: 84
End: 2024-06-25

## 2024-06-25 NOTE — TELEPHONE ENCOUNTER
Patient was updated on lab results. He said he has some slight edema in his lower extremities. He wanted to know if adjustments should be made to his lasix? I tried to schedule f/u appt for patient, but availability was booked until October. Can we call the patient with a sooner appointment? Afternoons work best for him. Please call back with an update.

## 2024-06-25 NOTE — TELEPHONE ENCOUNTER
Pt advised to continue same dose of Torsemide per Angela.  No changes.  Pt has repeat labs in two weeks.  F/u visit scheduled for September.

## 2024-06-25 NOTE — TELEPHONE ENCOUNTER
Message left on patient's VM that CR slightly above baseline.  Asked that pt call back if any symptoms including edema.  Per Angela, repeat BMP in two weeks.  Asked that he also call back to schedule followup visit.        ----- Message from Angela Berrios PA-C sent at 6/24/2024  6:01 PM EDT -----  Labs reviewed.  Creatinine slightly above baseline.  Please call patient and inquire regarding any symptoms, including edema.  Please schedule patient for follow-up as he is due in July.  Please repeat BMP in 2 weeks.  Orders in epic.

## 2024-06-26 ENCOUNTER — OFFICE VISIT (OUTPATIENT)
Dept: FAMILY MEDICINE CLINIC | Facility: CLINIC | Age: 84
End: 2024-06-26
Payer: COMMERCIAL

## 2024-06-26 VITALS
BODY MASS INDEX: 33.5 KG/M2 | DIASTOLIC BLOOD PRESSURE: 64 MMHG | WEIGHT: 234 LBS | SYSTOLIC BLOOD PRESSURE: 138 MMHG | TEMPERATURE: 97.5 F | OXYGEN SATURATION: 95 % | HEIGHT: 70 IN | HEART RATE: 61 BPM | RESPIRATION RATE: 17 BRPM

## 2024-06-26 DIAGNOSIS — E11.69 TYPE 2 DIABETES MELLITUS WITH OBESITY  (HCC): ICD-10-CM

## 2024-06-26 DIAGNOSIS — E66.9 TYPE 2 DIABETES MELLITUS WITH OBESITY  (HCC): ICD-10-CM

## 2024-06-26 DIAGNOSIS — Z00.00 MEDICARE ANNUAL WELLNESS VISIT, SUBSEQUENT: Primary | ICD-10-CM

## 2024-06-26 DIAGNOSIS — N18.32 STAGE 3B CHRONIC KIDNEY DISEASE (HCC): ICD-10-CM

## 2024-06-26 PROBLEM — E66.01 SEVERE OBESITY WITH BODY MASS INDEX (BMI) OF 35.0 TO 35.9 AND COMORBIDITY (HCC): Status: RESOLVED | Noted: 2021-07-19 | Resolved: 2024-06-26

## 2024-06-26 PROCEDURE — G0439 PPPS, SUBSEQ VISIT: HCPCS | Performed by: NURSE PRACTITIONER

## 2024-06-26 NOTE — PROGRESS NOTES
Ambulatory Visit  Name: Jeremie Adams      : 1940      MRN: [id number]  Encounter Provider: JEOVANY Almodovar  Encounter Date: 2024   Encounter department: [facility] [place]    Assessment & Plan   1. Medicare annual wellness visit, subsequent      Depression Screening and Follow-up Plan: Patient was screened for depression during today's encounter. They screened negative with a PHQ-2 score of 0.    Falls Plan of Care: balance, strength, and gait training instructions were provided. Recommended assistive device to help with gait and balance. Medications that increase falls were reviewed. Assessed feet and footwear.   Preventive health issues were discussed with patient, and age appropriate screening tests were ordered as noted in patient's After Visit Summary. Personalized health advice and appropriate referrals for health education or preventive services given if needed, as noted in patient's After Visit Summary.    History of Present Illness     Recent Results (from the past 672 hour(s))  -Hemoglobin A1C:   Collection Time: 24  1:13 PM       Result                      Value             Ref Range           Hemoglobin A1C              7.1 (H)           Normal 4.0-5*       EAG                         157               mg/dl          -Albumin / creatinine urine ratio:   Collection Time: 24  1:13 PM       Result                      Value             Ref Range           Creatinine, Ur              63.8              Reference ra*       Albumin,U,Random            70.3 (H)          <20.0 mg/L          Albumin Creat Ratio         110 (H)           0 - 30 mg/g *  -Lipid Panel with Direct LDL reflex:   Collection Time: 24  1:13 PM       Result                      Value             Ref Range           Cholesterol                 150               See Comment *       Triglycerides               143               See Comment *       HDL, Direct                 47                 >=40 mg/dL          LDL Calculated              74                0 - 100 mg/dL  -CBC and differential:   Collection Time: 06/24/24  1:13 PM       Result                      Value             Ref Range           WBC                         9.42              4.31 - 10.16*       RBC                         3.94              3.88 - 5.62 *       Hemoglobin                  12.1              12.0 - 17.0 *       Hematocrit                  37.2              36.5 - 49.3 %       MCV                         94                82 - 98 fL          MCH                         30.7              26.8 - 34.3 *       MCHC                        32.5              31.4 - 37.4 *       RDW                         12.8              11.6 - 15.1 %       MPV                         10.4              8.9 - 12.7 fL       Platelets                   154               149 - 390 Th*       nRBC                        0                 /100 WBCs           Segmented %                 65                43 - 75 %           Immature Grans %            0                 0 - 2 %             Lymphocytes %               25                14 - 44 %           Monocytes %                 8                 4 - 12 %            Eosinophils Relative        2                 0 - 6 %             Basophils Relative          0                 0 - 1 %             Absolute Neutrophils        6.04              1.85 - 7.62 *       Absolute Immature Grans     0.04              0.00 - 0.20 *       Absolute Lymphocytes        2.38              0.60 - 4.47 *       Absolute Monocytes          0.76              0.17 - 1.22 *       Eosinophils Absolute        0.16              0.00 - 0.61 *       Basophils Absolute          0.04              0.00 - 0.10 *  -Magnesium:   Collection Time: 06/24/24  1:13 PM       Result                      Value             Ref Range           Magnesium                   2.1               1.9 - 2.7 mg*  -Renal function panel:   Collection  Time: 06/24/24  1:13 PM       Result                      Value             Ref Range           Albumin                     4.1               3.5 - 5.0 g/*       Calcium                     9.9               8.4 - 10.2 m*       Phosphorus                  4.3 (H)           2.3 - 4.1 mg*       BUN                         38 (H)            5 - 25 mg/dL        Creatinine                  2.27 (H)          0.60 - 1.30 *       Sodium                      138               135 - 147 mm*       Potassium                   4.4               3.5 - 5.3 mm*       Chloride                    103               96 - 108 mmo*       CO2                         25                21 - 32 mmol*       ANION GAP                   10                4 - 13 mmol/L       eGFR                        25                ml/min/1.73s*       Glucose, Fasting            151 (H)           65 - 99 mg/dL  -PTH, intact:   Collection Time: 06/24/24  1:13 PM       Result                      Value             Ref Range           PTH                         14.8              12.0 - 88.0 *  -Vitamin D 25 hydroxy:   Collection Time: 06/24/24  1:13 PM       Result                      Value             Ref Range           Vit D, 25-Hydroxy           44.6              30.0 - 100.0*  -T4, free:   Collection Time: 06/24/24  1:13 PM       Result                      Value             Ref Range           Free T4                     1.28 (H)          0.61 - 1.12 *  -TSH, 3rd generation:   Collection Time: 06/24/24  1:13 PM       Result                      Value             Ref Range           TSH 3RD GENERATON           2.162             0.450 - 4.50*         Patient Care Team:  JEOVANY Barth as PCP - General (Family Medicine)  Jamie Barrera MD (Nephrology)    Review of Systems  Medical History Reviewed by provider this encounter:  Tobacco  Allergies  Meds  Problems  Med Hx  Surg Hx  Fam Hx       Annual Wellness Visit Questionnaire   Jeremie pond  here for his Subsequent Wellness visit. Last Medicare Wellness visit information reviewed, patient interviewed and updates made to the record today.      Health Risk Assessment:   Patient rates overall health as very good. Patient feels that their physical health rating is same. Patient is very satisfied with their life. Eyesight was rated as same. Hearing was rated as same. Patient feels that their emotional and mental health rating is same. Patients states they are never, rarely angry. Patient states they are sometimes unusually tired/fatigued. Pain experienced in the last 7 days has been some. Patient's pain rating has been 1/10. Patient states that he has experienced no weight loss or gain in last 6 months. Chronic ankle pain    Depression Screening:   PHQ-2 Score: 0      Fall Risk Screening:   In the past year, patient has experienced: no history of falling in past year      Home Safety:  Patient does not have trouble with stairs inside or outside of their home. Patient has working smoke alarms and has no working carbon monoxide detector. Home safety hazards include: none.     Nutrition:   Current diet is Diabetic and Limited junk food.     Medications:   Patient is not currently taking any over-the-counter supplements. Patient is able to manage medications.     Activities of Daily Living (ADLs)/Instrumental Activities of Daily Living (IADLs):   Walk and transfer into and out of bed and chair?: Yes  Dress and groom yourself?: Yes    Bathe or shower yourself?: Yes    Feed yourself? Yes  Do your laundry/housekeeping?: Yes  Manage your money, pay your bills and track your expenses?: Yes  Make your own meals?: Yes    Do your own shopping?: Yes    Previous Hospitalizations:   Any hospitalizations or ED visits within the last 12 months?: No      Advance Care Planning:   Living will: No    Durable POA for healthcare: No    Advanced directive: No    Advanced directive counseling given: Yes    ACP document given: Yes       Cognitive Screening:   Provider or family/friend/caregiver concerned regarding cognition?: No    PREVENTIVE SCREENINGS      Cardiovascular Screening:    General: History Lipid Disorder and Screening Current      Diabetes Screening:     General: History Diabetes and Screening Current      Colorectal Cancer Screening:     General: Patient Declines      Prostate Cancer Screening:    General: Screening Not Indicated      Osteoporosis Screening:    General: Risks and Benefits Discussed    Due for: Bone Density Ultrasound      Abdominal Aortic Aneurysm (AAA) Screening:        General: Patient Declines and Screening Not Indicated      Lung Cancer Screening:     General: Screening Not Indicated      Hepatitis C Screening:    General: Risks and Benefits Discussed    Hep C Screening Accepted: No     Screening, Brief Intervention, and Referral to Treatment (SBIRT)    Screening  Typical number of drinks in a day: 1  Typical number of drinks in a week: 4  Interpretation: Low risk drinking behavior.    Single Item Drug Screening:  How often have you used an illegal drug (including marijuana) or a prescription medication for non-medical reasons in the past year? never    Single Item Drug Screen Score: 0  Interpretation: Negative screen for possible drug use disorder    Brief Intervention  Alcohol & drug use screenings were reviewed. No concerns regarding substance use disorder identified. Healthy alcohol use/limits discussed.     Other Counseling Topics:   Car/seat belt/driving safety, skin self-exam, sunscreen and calcium and vitamin D intake and regular weightbearing exercise.     Social Determinants of Health     Financial Resource Strain: Low Risk  (4/27/2023)    Overall Financial Resource Strain (CARDIA)    • Difficulty of Paying Living Expenses: Not hard at all   Food Insecurity: No Food Insecurity (6/26/2024)    Hunger Vital Sign    • Worried About Running Out of Food in the Last Year: Never true    • Ran Out of Food in  "the Last Year: Never true   Transportation Needs: No Transportation Needs (6/26/2024)    PRAPARE - Transportation    • Lack of Transportation (Medical): No    • Lack of Transportation (Non-Medical): No   Housing Stability: Low Risk  (6/26/2024)    Housing Stability Vital Sign    • Unable to Pay for Housing in the Last Year: No    • Number of Times Moved in the Last Year: 0    • Homeless in the Last Year: No   Utilities: Not At Risk (6/26/2024)    [employer] [facility] [employer]    • Threatened with loss of utilities: No     No results found.    Objective     /64 (BP Location: Left arm, Patient Position: Sitting, Cuff Size: Large)   Pulse 61   Temp 97.5 °F (36.4 °C) (Temporal)   Resp 17   Ht 5' 10\" (1.778 m)   Wt 106 kg (234 lb)   SpO2 95%   BMI 33.58 kg/m²     Physical Exam  Vitals and nursing note reviewed.   Constitutional:       Appearance: Normal appearance.   Cardiovascular:      Rate and Rhythm: Normal rate and regular rhythm.      Pulses: Normal pulses.      Heart sounds: Murmur heard.   Pulmonary:      Effort: Pulmonary effort is normal.      Breath sounds: Normal breath sounds.   Neurological:      General: No focal deficit present.      Mental Status: He is alert. Mental status is at baseline.     Administrative Statements           "

## 2024-06-26 NOTE — PATIENT INSTRUCTIONS
Medicare Preventive Visit Patient Instructions  Thank you for completing your Welcome to Medicare Visit or Medicare Annual Wellness Visit today. Your next wellness visit will be due in one year (6/27/2025).  The screening/preventive services that you may require over the next 5-10 years are detailed below. Some tests may not apply to you based off risk factors and/or age. Screening tests ordered at today's visit but not completed yet may show as past due. Also, please note that scanned in results may not display below.  Preventive Screenings:  Service Recommendations Previous Testing/Comments   Colorectal Cancer Screening  Colonoscopy    Fecal Occult Blood Test (FOBT)/Fecal Immunochemical Test (FIT)  Fecal DNA/Cologuard Test  Flexible Sigmoidoscopy Age: 45-75 years old   Colonoscopy: every 10 years (May be performed more frequently if at higher risk)  OR  FOBT/FIT: every 1 year  OR  Cologuard: every 3 years  OR  Sigmoidoscopy: every 5 years  Screening may be recommended earlier than age 45 if at higher risk for colorectal cancer. Also, an individualized decision between you and your healthcare provider will decide whether screening between the ages of 76-85 would be appropriate. Colonoscopy: Not on file  FOBT/FIT: Not on file  Cologuard: Not on file  Sigmoidoscopy: Not on file          Prostate Cancer Screening Individualized decision between patient and health care provider in men between ages of 55-69   Medicare will cover every 12 months beginning on the day after your 50th birthday PSA: 2.1 ng/mL           Hepatitis C Screening Once for adults born between 1945 and 1965  More frequently in patients at high risk for Hepatitis C Hep C Antibody: Not on file        Diabetes Screening 1-2 times per year if you're at risk for diabetes or have pre-diabetes Fasting glucose: 151 mg/dL (6/24/2024)  A1C: 7.1 % (6/24/2024)      Cholesterol Screening Once every 5 years if you don't have a lipid disorder. May order more often  based on risk factors. Lipid panel: 06/24/2024         Other Preventive Screenings Covered by Medicare:  Abdominal Aortic Aneurysm (AAA) Screening: covered once if your at risk. You're considered to be at risk if you have a family history of AAA or a male between the age of 65-75 who smoking at least 100 cigarettes in your lifetime.  Lung Cancer Screening: covers low dose CT scan once per year if you meet all of the following conditions: (1) Age 55-77; (2) No signs or symptoms of lung cancer; (3) Current smoker or have quit smoking within the last 15 years; (4) You have a tobacco smoking history of at least 20 pack years (packs per day x number of years you smoked); (5) You get a written order from a healthcare provider.  Glaucoma Screening: covered annually if you're considered high risk: (1) You have diabetes OR (2) Family history of glaucoma OR (3)  aged 50 and older OR (4)  American aged 65 and older  Osteoporosis Screening: covered every 2 years if you meet one of the following conditions: (1) Have a vertebral abnormality; (2) On glucocorticoid therapy for more than 3 months; (3) Have primary hyperparathyroidism; (4) On osteoporosis medications and need to assess response to drug therapy.  HIV Screening: covered annually if you're between the age of 15-65. Also covered annually if you are younger than 15 and older than 65 with risk factors for HIV infection. For pregnant patients, it is covered up to 3 times per pregnancy.    Immunizations:  Immunization Recommendations   Influenza Vaccine Annual influenza vaccination during flu season is recommended for all persons aged >= 6 months who do not have contraindications   Pneumococcal Vaccine   * Pneumococcal conjugate vaccine = PCV13 (Prevnar 13), PCV15 (Vaxneuvance), PCV20 (Prevnar 20)  * Pneumococcal polysaccharide vaccine = PPSV23 (Pneumovax) Adults 19-63 yo with certain risk factors or if 65+ yo  If never received any pneumonia vaccine:  recommend Prevnar 20 (PCV20)  Give PCV20 if previously received 1 dose of PCV13 or PPSV23   Hepatitis B Vaccine 3 dose series if at intermediate or high risk (ex: diabetes, end stage renal disease, liver disease)   Respiratory syncytial virus (RSV) Vaccine - COVERED BY MEDICARE PART D  * RSVPreF3 (Arexvy) CDC recommends that adults 60 years of age and older may receive a single dose of RSV vaccine using shared clinical decision-making (SCDM)   Tetanus (Td) Vaccine - COST NOT COVERED BY MEDICARE PART B Following completion of primary series, a booster dose should be given every 10 years to maintain immunity against tetanus. Td may also be given as tetanus wound prophylaxis.   Tdap Vaccine - COST NOT COVERED BY MEDICARE PART B Recommended at least once for all adults. For pregnant patients, recommended with each pregnancy.   Shingles Vaccine (Shingrix) - COST NOT COVERED BY MEDICARE PART B  2 shot series recommended in those 19 years and older who have or will have weakened immune systems or those 50 years and older     Health Maintenance Due:  There are no preventive care reminders to display for this patient.  Immunizations Due:      Topic Date Due   • COVID-19 Vaccine (3 - 2023-24 season) 09/01/2023   • Influenza Vaccine (Season Ended) 09/01/2024     Advance Directives   What are advance directives?  Advance directives are legal documents that state your wishes and plans for medical care. These plans are made ahead of time in case you lose your ability to make decisions for yourself. Advance directives can apply to any medical decision, such as the treatments you want, and if you want to donate organs.   What are the types of advance directives?  There are many types of advance directives, and each state has rules about how to use them. You may choose a combination of any of the following:  Living will:  This is a written record of the treatment you want. You can also choose which treatments you do not want, which  to limit, and which to stop at a certain time. This includes surgery, medicine, IV fluid, and tube feedings.   Durable power of  for healthcare (DPAHC):  This is a written record that states who you want to make healthcare choices for you when you are unable to make them for yourself. This person, called a proxy, is usually a family member or a friend. You may choose more than 1 proxy.  Do not resuscitate (DNR) order:  A DNR order is used in case your heart stops beating or you stop breathing. It is a request not to have certain forms of treatment, such as CPR. A DNR order may be included in other types of advance directives.  Medical directive:  This covers the care that you want if you are in a coma, near death, or unable to make decisions for yourself. You can list the treatments you want for each condition. Treatment may include pain medicine, surgery, blood transfusions, dialysis, IV or tube feedings, and a ventilator (breathing machine).  Values history:  This document has questions about your views, beliefs, and how you feel and think about life. This information can help others choose the care that you would choose.  Why are advance directives important?  An advance directive helps you control your care. Although spoken wishes may be used, it is better to have your wishes written down. Spoken wishes can be misunderstood, or not followed. Treatments may be given even if you do not want them. An advance directive may make it easier for your family to make difficult choices about your care.   Weight Management   Why it is important to manage your weight:  Being overweight increases your risk of health conditions such as heart disease, high blood pressure, type 2 diabetes, and certain types of cancer. It can also increase your risk for osteoarthritis, sleep apnea, and other respiratory problems. Aim for a slow, steady weight loss. Even a small amount of weight loss can lower your risk of health  problems.  How to lose weight safely:  A safe and healthy way to lose weight is to eat fewer calories and get regular exercise. You can lose up about 1 pound a week by decreasing the number of calories you eat by 500 calories each day.   Healthy meal plan for weight management:  A healthy meal plan includes a variety of foods, contains fewer calories, and helps you stay healthy. A healthy meal plan includes the following:  Eat whole-grain foods more often.  A healthy meal plan should contain fiber. Fiber is the part of grains, fruits, and vegetables that is not broken down by your body. Whole-grain foods are healthy and provide extra fiber in your diet. Some examples of whole-grain foods are whole-wheat breads and pastas, oatmeal, brown rice, and bulgur.  Eat a variety of vegetables every day.  Include dark, leafy greens such as spinach, kale, robert greens, and mustard greens. Eat yellow and orange vegetables such as carrots, sweet potatoes, and winter squash.   Eat a variety of fruits every day.  Choose fresh or canned fruit (canned in its own juice or light syrup) instead of juice. Fruit juice has very little or no fiber.  Eat low-fat dairy foods.  Drink fat-free (skim) milk or 1% milk. Eat fat-free yogurt and low-fat cottage cheese. Try low-fat cheeses such as mozzarella and other reduced-fat cheeses.  Choose meat and other protein foods that are low in fat.  Choose beans or other legumes such as split peas or lentils. Choose fish, skinless poultry (chicken or turkey), or lean cuts of red meat (beef or pork). Before you cook meat or poultry, cut off any visible fat.   Use less fat and oil.  Try baking foods instead of frying them. Add less fat, such as margarine, sour cream, regular salad dressing and mayonnaise to foods. Eat fewer high-fat foods. Some examples of high-fat foods include french fries, doughnuts, ice cream, and cakes.  Eat fewer sweets.  Limit foods and drinks that are high in sugar. This  includes candy, cookies, regular soda, and sweetened drinks.  Exercise:  Exercise at least 30 minutes per day on most days of the week. Some examples of exercise include walking, biking, dancing, and swimming. You can also fit in more physical activity by taking the stairs instead of the elevator or parking farther away from stores. Ask your healthcare provider about the best exercise plan for you.      © Copyright [employer] 2018 Information is for End User's use only and may not be sold, redistributed or otherwise used for commercial purposes. All illustrations and images included in CareNotes® are the copyrighted property of [employer] or [employer]

## 2024-06-27 ENCOUNTER — TELEPHONE (OUTPATIENT)
Dept: NEPHROLOGY | Facility: CLINIC | Age: 84
End: 2024-06-27

## 2024-06-27 DIAGNOSIS — E78.2 MIXED HYPERLIPIDEMIA: ICD-10-CM

## 2024-06-27 DIAGNOSIS — N18.4 BENIGN HYPERTENSION WITH CHRONIC KIDNEY DISEASE, STAGE IV (HCC): Primary | ICD-10-CM

## 2024-06-27 DIAGNOSIS — I10 ESSENTIAL HYPERTENSION: ICD-10-CM

## 2024-06-27 DIAGNOSIS — I12.9 BENIGN HYPERTENSION WITH CHRONIC KIDNEY DISEASE, STAGE IV (HCC): Primary | ICD-10-CM

## 2024-06-27 RX ORDER — CLONIDINE 0.1 MG/24H
1 PATCH, EXTENDED RELEASE TRANSDERMAL WEEKLY
Qty: 12 PATCH | Refills: 1 | Status: SHIPPED | OUTPATIENT
Start: 2024-06-27

## 2024-06-27 NOTE — TELEPHONE ENCOUNTER
I called the patient and we spoke over the phone.   Recent laboratory data were reviewed.     Lab Results   Component Value Date    SODIUM 138 06/24/2024    K 4.4 06/24/2024     06/24/2024    CO2 25 06/24/2024    BUN 38 (H) 06/24/2024    CREATININE 2.27 (H) 06/24/2024    GLUC 138 12/29/2023    CALCIUM 9.9 06/24/2024     Creatinine is higher than recent baseline.   Patient still feels fatigued.     He is taking Clonidine 0.3 mg patch - 1/2 patch weekly.   Carvedilol was still at 12.5 mg BID until yesterday when he was advised to decrease to 6.25 mg BID by PCP.     Plan:  Decrease Clonidine patch to 0.1 mg weekly.   Continue with Losartan and Spironolactone at current doses.   Check BMP in 4 weeks - order already in place.

## 2024-07-03 DIAGNOSIS — Z79.4 TYPE 2 DIABETES MELLITUS WITHOUT COMPLICATION, WITH LONG-TERM CURRENT USE OF INSULIN (HCC): ICD-10-CM

## 2024-07-03 DIAGNOSIS — E78.2 MIXED HYPERLIPIDEMIA: ICD-10-CM

## 2024-07-03 DIAGNOSIS — E11.9 TYPE 2 DIABETES MELLITUS WITHOUT COMPLICATION, WITH LONG-TERM CURRENT USE OF INSULIN (HCC): ICD-10-CM

## 2024-07-03 RX ORDER — EZETIMIBE 10 MG/1
10 TABLET ORAL DAILY
Qty: 100 TABLET | Refills: 1 | Status: SHIPPED | OUTPATIENT
Start: 2024-07-03 | End: 2024-12-30

## 2024-07-03 RX ORDER — LINAGLIPTIN 5 MG/1
5 TABLET, FILM COATED ORAL DAILY
Qty: 30 TABLET | Refills: 5 | Status: SHIPPED | OUTPATIENT
Start: 2024-07-03

## 2024-07-05 ENCOUNTER — OFFICE VISIT (OUTPATIENT)
Dept: ENDOCRINOLOGY | Facility: CLINIC | Age: 84
End: 2024-07-05
Payer: COMMERCIAL

## 2024-07-05 VITALS
OXYGEN SATURATION: 97 % | DIASTOLIC BLOOD PRESSURE: 80 MMHG | TEMPERATURE: 97.9 F | SYSTOLIC BLOOD PRESSURE: 130 MMHG | HEIGHT: 70 IN | HEART RATE: 66 BPM | WEIGHT: 230 LBS | BODY MASS INDEX: 32.93 KG/M2

## 2024-07-05 DIAGNOSIS — E78.2 MIXED HYPERLIPIDEMIA: ICD-10-CM

## 2024-07-05 DIAGNOSIS — N18.4: ICD-10-CM

## 2024-07-05 DIAGNOSIS — E11.65 TYPE 2 DIABETES MELLITUS WITH HYPERGLYCEMIA, WITHOUT LONG-TERM CURRENT USE OF INSULIN (HCC): Primary | ICD-10-CM

## 2024-07-05 DIAGNOSIS — E66.9 CLASS 1 OBESITY WITH SERIOUS COMORBIDITY AND BODY MASS INDEX (BMI) OF 33.0 TO 33.9 IN ADULT, UNSPECIFIED OBESITY TYPE: ICD-10-CM

## 2024-07-05 DIAGNOSIS — E09.22: ICD-10-CM

## 2024-07-05 DIAGNOSIS — R60.0 LOWER EXTREMITY EDEMA: ICD-10-CM

## 2024-07-05 PROBLEM — E66.811 CLASS 1 OBESITY WITH SERIOUS COMORBIDITY AND BODY MASS INDEX (BMI) OF 33.0 TO 33.9 IN ADULT: Status: ACTIVE | Noted: 2024-06-26

## 2024-07-05 PROCEDURE — G2211 COMPLEX E/M VISIT ADD ON: HCPCS | Performed by: INTERNAL MEDICINE

## 2024-07-05 PROCEDURE — 99214 OFFICE O/P EST MOD 30 MIN: CPT | Performed by: INTERNAL MEDICINE

## 2024-07-05 NOTE — ASSESSMENT & PLAN NOTE
He is controlled and meets standards of care.    Today we reviewed options and I suggested either continue current therapy or use a GLP 1 agonist in stead of Tradjenta.  Will avoid SGLT2i at this time due to low eGFR <30.    Pt elected to continue current regimen and will make more changes in lifestyle by increased exercise input and then fasting after dinner.    Follow up in 3 months.    Lab Results   Component Value Date    HGBA1C 7.1 (H) 06/24/2024

## 2024-07-05 NOTE — PROGRESS NOTES
"    Follow-up Patient Progress Note      CC:Type 2 diabetes     History of Present Illness:   83 yr male with Type 2 diabetes since 2008, CKD4 eGFR 25, low ALP, HTN, HLD, obesity BMI 35 and vitamin D deficiency. Last visit was 3/21/24.     Since last visit, weight is down 3 lbs. He made significant changes to diet and lifestyle and feels well.  Some improved energy is reported.        CGM data review::  Device: Stephon pro         dates:  8/17/2023-8/31/2023   usage: 100%   Av glu: 123 mg/dL                   SD:  mg/dL            CV: 15.6%       GMI:   %  TIR: 99%                     TAR: 1%                      TBR: 0%     Glycemic patters: Excellent control with normal glycemia throughout.  Hypoglycemia: No     Current meds:  Tradjenta 5mg daily     Opthamology: yes. Macular degeneration  Podiatry: No  Vaccination: Yes   Dental:  Pancreatitis: No     Ace/ARB: losartan  Statin: pravastain  Thyroid issues: No     6/27/23 DXA: T Scores 1.5 LS, -0.7LTH, -1.4 LFN and -1.1 Lt forearm. 10 yr FRAX score 2.1% hip and 6.3% major osteoporotic fracture.          Physical Exam:  Body mass index is 33 kg/m².  /80 (BP Location: Left arm, Patient Position: Sitting, Cuff Size: Standard)   Pulse 66   Temp 97.9 °F (36.6 °C) (Tympanic)   Ht 5' 10\" (1.778 m)   Wt 104 kg (230 lb)   SpO2 97%   BMI 33.00 kg/m²    Vitals:    07/05/24 1255   Weight: 104 kg (230 lb)        Physical Exam  Constitutional:       General: He is not in acute distress.     Appearance: He is well-developed.   HENT:      Head: Normocephalic and atraumatic.      Nose: Nose normal.   Eyes:      Conjunctiva/sclera: Conjunctivae normal.   Pulmonary:      Effort: Pulmonary effort is normal.   Abdominal:      General: There is no distension.   Musculoskeletal:      Cervical back: Normal range of motion and neck supple.   Skin:     Findings: No rash.      Comments: No icterus   Neurological:      Mental Status: He is alert and oriented to person, place, and " time.         Labs:   Lab Results   Component Value Date    HGBA1C 7.1 (H) 06/24/2024       Lab Results   Component Value Date    TTC6NVRNNARR 2.162 06/24/2024       Lab Results   Component Value Date    CREATININE 2.27 (H) 06/24/2024    CREATININE 1.89 (H) 03/19/2024    CREATININE 1.72 (H) 12/29/2023    BUN 38 (H) 06/24/2024    K 4.4 06/24/2024     06/24/2024    CO2 25 06/24/2024     eGFR   Date Value Ref Range Status   06/24/2024 25 ml/min/1.73sq m Final       Lab Results   Component Value Date    ALT 16 11/02/2023    AST 16 11/02/2023    ALKPHOS 48 11/02/2023       Lab Results   Component Value Date    CHOLESTEROL 150 06/24/2024    CHOLESTEROL 147 11/02/2023    CHOLESTEROL 179 10/25/2022     Lab Results   Component Value Date    HDL 47 06/24/2024    HDL 42 11/02/2023    HDL 46 10/25/2022     Lab Results   Component Value Date    TRIG 143 06/24/2024    TRIG 163 (H) 11/02/2023    TRIG 124 10/25/2022     Lab Results   Component Value Date    NONHDLC 105 11/02/2023    NONHDLC 133 10/25/2022    NONHDLC 118 07/16/2021         Assessment/Plan:    1. Type 2 diabetes mellitus with hyperglycemia, without long-term current use of insulin (HCC)  Assessment & Plan:  He is controlled and meets standards of care.    Today we reviewed options and I suggested either continue current therapy or use a GLP 1 agonist in stead of Tradjenta.  Will avoid SGLT2i at this time due to low eGFR <30.    Pt elected to continue current regimen and will make more changes in lifestyle by increased exercise input and then fasting after dinner.    Follow up in 3 months.    Lab Results   Component Value Date    HGBA1C 7.1 (H) 06/24/2024     Orders:  -     Hemoglobin A1C; Future  -     Comprehensive metabolic panel; Future  2. 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 Mixed hyperlipidemia  4. Class 1 obesity with serious comorbidity and body mass index (BMI) of 33.0 to 33.9 in adult, unspecified obesity  type  Assessment & Plan:  Today we discussed all aspects of obesity and metabolism including pathophysiology, risk factors, complications, goal of sustaining weight loss long term, usual propensity to regain weight with short term approaches, follow up needs and medications - efficacy and limitations.   Discussed role of endocrinopathies, inflammatory conditions, sleep disorders, mental health disorders, lifestyle issues and polypharmacy and weight gain.  Briefly discussed bariatric surgery.  Diet: carb controlled diet <1500cal/day/ VLCD, 64oz fluids/day   lifestyle modifications: intermittent fasting and 10,000 steps/day  medical fitness training: muscle building  education: nutrition input  5. Lower extremity edema  Assessment & Plan:  Suggested regular use of compression stockings.        I have spent a total time of 32 minutes on 07/05/24 in caring for this patient including greater than 50% of this time was spent in counseling/coordination of care as listed above.       Discussed with the patient and all questioned fully answered. He will contact me with concerns.    Tony Bustos

## 2024-07-26 ENCOUNTER — APPOINTMENT (OUTPATIENT)
Dept: LAB | Facility: CLINIC | Age: 84
End: 2024-07-26
Payer: COMMERCIAL

## 2024-07-26 DIAGNOSIS — I12.9 BENIGN HYPERTENSION WITH CHRONIC KIDNEY DISEASE, STAGE III (HCC): ICD-10-CM

## 2024-07-26 DIAGNOSIS — N18.30 BENIGN HYPERTENSION WITH CHRONIC KIDNEY DISEASE, STAGE III (HCC): ICD-10-CM

## 2024-07-26 DIAGNOSIS — E11.65 TYPE 2 DIABETES MELLITUS WITH HYPERGLYCEMIA, WITHOUT LONG-TERM CURRENT USE OF INSULIN (HCC): ICD-10-CM

## 2024-07-26 LAB
ALBUMIN SERPL BCG-MCNC: 4.2 G/DL (ref 3.5–5)
ALP SERPL-CCNC: 45 U/L (ref 34–104)
ALT SERPL W P-5'-P-CCNC: 16 U/L (ref 7–52)
ANION GAP SERPL CALCULATED.3IONS-SCNC: 11 MMOL/L (ref 4–13)
AST SERPL W P-5'-P-CCNC: 15 U/L (ref 13–39)
BILIRUB SERPL-MCNC: 0.61 MG/DL (ref 0.2–1)
BUN SERPL-MCNC: 49 MG/DL (ref 5–25)
CALCIUM SERPL-MCNC: 9.7 MG/DL (ref 8.4–10.2)
CHLORIDE SERPL-SCNC: 100 MMOL/L (ref 96–108)
CO2 SERPL-SCNC: 25 MMOL/L (ref 21–32)
CREAT SERPL-MCNC: 2.18 MG/DL (ref 0.6–1.3)
EST. AVERAGE GLUCOSE BLD GHB EST-MCNC: 160 MG/DL
GFR SERPL CREATININE-BSD FRML MDRD: 27 ML/MIN/1.73SQ M
GLUCOSE P FAST SERPL-MCNC: 131 MG/DL (ref 65–99)
HBA1C MFR BLD: 7.2 %
POTASSIUM SERPL-SCNC: 4.7 MMOL/L (ref 3.5–5.3)
PROT SERPL-MCNC: 6.9 G/DL (ref 6.4–8.4)
SODIUM SERPL-SCNC: 136 MMOL/L (ref 135–147)

## 2024-07-26 PROCEDURE — 80053 COMPREHEN METABOLIC PANEL: CPT

## 2024-07-26 PROCEDURE — 83036 HEMOGLOBIN GLYCOSYLATED A1C: CPT

## 2024-07-26 PROCEDURE — 36415 COLL VENOUS BLD VENIPUNCTURE: CPT

## 2024-07-26 RX ORDER — TORSEMIDE 10 MG/1
15 TABLET ORAL DAILY
Qty: 135 TABLET | Refills: 3 | Status: SHIPPED | OUTPATIENT
Start: 2024-07-26

## 2024-08-07 ENCOUNTER — OFFICE VISIT (OUTPATIENT)
Dept: NEPHROLOGY | Facility: CLINIC | Age: 84
End: 2024-08-07
Payer: COMMERCIAL

## 2024-08-07 VITALS
SYSTOLIC BLOOD PRESSURE: 152 MMHG | DIASTOLIC BLOOD PRESSURE: 74 MMHG | WEIGHT: 228 LBS | BODY MASS INDEX: 32.64 KG/M2 | HEIGHT: 70 IN | HEART RATE: 68 BPM

## 2024-08-07 DIAGNOSIS — E83.9 CHRONIC KIDNEY DISEASE-MINERAL AND BONE DISORDER: ICD-10-CM

## 2024-08-07 DIAGNOSIS — N18.32 STAGE 3B CHRONIC KIDNEY DISEASE (HCC): Primary | ICD-10-CM

## 2024-08-07 DIAGNOSIS — I1A.0 RESISTANT HYPERTENSION: ICD-10-CM

## 2024-08-07 DIAGNOSIS — N06.9 ISOLATED PROTEINURIA WITH MORPHOLOGIC LESION: ICD-10-CM

## 2024-08-07 DIAGNOSIS — N18.4 BENIGN HYPERTENSION WITH CHRONIC KIDNEY DISEASE, STAGE IV (HCC): ICD-10-CM

## 2024-08-07 DIAGNOSIS — M89.9 CHRONIC KIDNEY DISEASE-MINERAL AND BONE DISORDER: ICD-10-CM

## 2024-08-07 DIAGNOSIS — N18.9 CHRONIC KIDNEY DISEASE-MINERAL AND BONE DISORDER: ICD-10-CM

## 2024-08-07 DIAGNOSIS — I12.9 BENIGN HYPERTENSION WITH CHRONIC KIDNEY DISEASE, STAGE IV (HCC): ICD-10-CM

## 2024-08-07 PROCEDURE — 99214 OFFICE O/P EST MOD 30 MIN: CPT | Performed by: INTERNAL MEDICINE

## 2024-08-07 NOTE — PROGRESS NOTES
NEPHROLOGY OFFICE PROGRESS NOTE   Jeremie Adams 84 y.o. male MRN: [id number]  DATE: 08/07/24  Reason for visit: Continued evaluation and management of CKD.     ASSESSMENT & PLAN:  Chronic kidney disease, stage IIIB  Lance's baseline creatinine was 1.6 to 2.0 but had gone up to 2.3 in early 2023.   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   SCr is up to 2.18 last month but was as high as 2.27 in June 2024.   Creatinine range seems to be around 1.7 to 2.2 now.   Completed CKD education.  He is currently on Losartan and Spironolactone.   Treatment: good BP control and glycemic control.     Proteinuria:  Urine ACR is down to 110 mg/g  Continue Losartan 25 mg BID.   Continue Spironolactone 25 mg QOD.     Hypertension:  Home BP readings are at goal (<130/80)  Current regimen: Torsemide 5 mg in AM and 10 mg in PM, Clonidine 0.1 mg patch, Carvedilol 6.25 mg BID, Spironolactone 25 mg QOD, Losartan 25 mg BID.   No changes.   No evidence of hyperaldosteronism.    Mineral and bone disease:  PTH is at goal in June 2024.   Ca and phos are at goal.   Vitamin D level is at goal in June 2024.   Continue Vit D to 2000 units daily.     Diabetes mellitus:  Diabetic management is deferred to endocrinology or PCP.  Consider SGLT2 inhibitors in the near future.     Hyperlipidemia:  On pravastatin and ezetimibe.   Follow up with PCP.     Patient Instructions   I recommend no change to your medications.   Your creatinine is a little higher but the slight rise is acceptable.   Call me if you note SBP readings < 110 mm Hg.   Follow up in 4 months.     SUBJECTIVE / INTERVAL HISTORY:  Lance was last seen in the office in Jan 2024.   No hospital stays or ER visits.   In March 2024, he was found to have low BP readings with significant fatigue.  During that time, we decreased carvedilol to 6.25 mg BID and cut back the Clonidine.   A little bit over a month ago, we decreased his clonidine further to 0.1 mg patch.   Home BP is usually in the  "110s to 130s over 70s.  For the most part, the SBP is in the 120s.  He is still continuing to lose weight.  He feels fairly well.  No new acute medical issues.    BP cuff calibration 1/2/24  Home BP cuff (wrist) 144/65  Office BP cuff 138/74     PMH/PSH: HTN, DM, HLP, CAD, AS, LEONORA on CPAP, leg edema, BPH, DJD, SBO, macular degeneration, tonsillectomy, R inguinal hernia, retinal surgery.      Previous work up:   November 1, 2019:  Aldosterone 6, plasma renin activity 0.22, UPC ratio 0.85, metanephrines 34, normetanephrines 83, TSH 1.53     10/25/19 Renal US: R 12.9 cm, L 14.3 cm, bilateral cortical cysts.  No hydronephrosis.    ALLERGIES: No Known Allergies    REVIEW OF SYSTEMS:  Review of Systems   Constitutional:  Negative for appetite change, chills, fatigue and fever.   Respiratory:  Negative for cough and shortness of breath.    Cardiovascular:  Negative for chest pain and leg swelling.   Gastrointestinal:  Negative for abdominal pain, diarrhea, nausea and vomiting.   Genitourinary:  Negative for dysuria and hematuria.   Musculoskeletal:  Positive for back pain. Negative for arthralgias.   Allergic/Immunologic: Negative for immunocompromised state.   Neurological:  Negative for dizziness and light-headedness.     OBJECTIVE:  /74 (BP Location: Left arm, Patient Position: Sitting, Cuff Size: Large)   Pulse 68   Ht 5' 10\" (1.778 m)   Wt 103 kg (228 lb)   BMI 32.71 kg/m²   Current Weight: Weight - Scale: 103 kg (228 lb) Body mass index is 32.71 kg/m².  Physical Exam  Constitutional:       General: He is not in acute distress.     Appearance: Normal appearance. He is well-developed. He is obese. He is not ill-appearing, toxic-appearing or diaphoretic.   HENT:      Head: Normocephalic and atraumatic.   Eyes:      General: No scleral icterus.     Conjunctiva/sclera: Conjunctivae normal.   Neck:      Vascular: No JVD.   Cardiovascular:      Rate and Rhythm: Normal rate and regular rhythm.      Heart sounds: " Murmur heard.   Pulmonary:      Effort: Pulmonary effort is normal. No respiratory distress.      Breath sounds: Normal breath sounds.   Abdominal:      General: Bowel sounds are normal.      Palpations: Abdomen is soft.   Musculoskeletal:      Cervical back: Neck supple.      Right lower leg: No edema.      Left lower leg: No edema.   Skin:     General: Skin is warm and dry.   Neurological:      Mental Status: He is alert and oriented to person, place, and time.   Psychiatric:         Mood and Affect: Mood normal.         Behavior: Behavior normal.         Judgment: Judgment normal.       Medications:  Current Outpatient Medications:     aspirin 325 mg tablet, Take 325 mg by mouth daily, Disp: , Rfl:     Biotin 5 MG TABS, Take by mouth daily, Disp: , Rfl:     carvedilol (COREG) 12.5 mg tablet, Take 0.5 tablets (6.25 mg total) by mouth 2 (two) times a day (Patient taking differently: Take 6.25 mg by mouth in the morning), Disp: , Rfl:     cholecalciferol (VITAMIN D3) 1,000 units tablet, Take 1,000 Units by mouth 2 (two) times a day, Disp: , Rfl:     cloNIDine (CATAPRES-TTS-1) 0.1 mg/24 hr, Place 1 patch (0.1 mg total) on the skin over 7 days once a week, Disp: 12 patch, Rfl: 1    co-enzyme Q-10 30 MG capsule, Take 100 mg by mouth daily , Disp: , Rfl:     ezetimibe (ZETIA) 10 mg tablet, Take 1 tablet (10 mg total) by mouth daily, Disp: 100 tablet, Rfl: 1    GLUCOSAMINE-CHONDROITIN PO, Take 2 tablets by mouth daily, Disp: , Rfl:     losartan (COZAAR) 25 mg tablet, Take 1 tablet (25 mg total) by mouth 2 (two) times a day, Disp: 180 tablet, Rfl: 3    LUTEIN-ZEAXANTHIN-BILBERRY PO, Take by mouth 2 (two) times a day, Disp: , Rfl:     milk thistle 175 MG tablet, Take 175 mg by mouth daily, Disp: , Rfl:     Misc Natural Products (OSTEO BI-FLEX TRIPLE STRENGTH PO), Take 1 tablet by mouth 2 (two) times a day, Disp: , Rfl:     multivitamin (THERAGRAN) TABS, Take 1 tablet by mouth daily, Disp: , Rfl:     Omega-3 Fatty Acids  (FISH OIL) 1,000 mg, Take 1,000 mg by mouth daily, Disp: , Rfl:     pravastatin (PRAVACHOL) 40 mg tablet, TAKE ONE TABLET BY MOUTH EVERY DAY, Disp: 90 tablet, Rfl: 1    spironolactone (ALDACTONE) 25 mg tablet, TAKE ONE TABLET BY MOUTH EVERY OTHER DAY, Disp: 45 tablet, Rfl: 1    Taurine 500 MG CAPS, Take by mouth daily, Disp: , Rfl:     thiamine (VITAMIN B1) 100 mg tablet, Take 100 mg by mouth daily, Disp: , Rfl:     torsemide (DEMADEX) 10 mg tablet, Take 1.5 tablets (15 mg total) by mouth daily, Disp: 135 tablet, Rfl: 3    Tradjenta 5 MG TABS, Take 5 mg by mouth daily, Disp: 30 tablet, Rfl: 5    TURMERIC PO, Take by mouth  , Disp: , Rfl:     vitamin E 100 UNIT capsule, Take 100 Units by mouth daily  , Disp: , Rfl:     Laboratory Results:  Results for orders placed or performed in visit on 07/26/24   Hemoglobin A1C   Result Value Ref Range    Hemoglobin A1C 7.2 (H) Normal 4.0-5.6%; PreDiabetic 5.7-6.4%; Diabetic >=6.5%; Glycemic control for adults with diabetes <7.0% %     mg/dl   Comprehensive metabolic panel   Result Value Ref Range    Sodium 136 135 - 147 mmol/L    Potassium 4.7 3.5 - 5.3 mmol/L    Chloride 100 96 - 108 mmol/L    CO2 25 21 - 32 mmol/L    ANION GAP 11 4 - 13 mmol/L    BUN 49 (H) 5 - 25 mg/dL    Creatinine 2.18 (H) 0.60 - 1.30 mg/dL    Glucose, Fasting 131 (H) 65 - 99 mg/dL    Calcium 9.7 8.4 - 10.2 mg/dL    AST 15 13 - 39 U/L    ALT 16 7 - 52 U/L    Alkaline Phosphatase 45 34 - 104 U/L    Total Protein 6.9 6.4 - 8.4 g/dL    Albumin 4.2 3.5 - 5.0 g/dL    Total Bilirubin 0.61 0.20 - 1.00 mg/dL    eGFR 27 ml/min/1.73sq m

## 2024-08-07 NOTE — PATIENT INSTRUCTIONS
I recommend no change to your medications.   Your creatinine is a little higher but the slight rise is acceptable.   Call me if you note SBP readings < 110 mm Hg.   Follow up in 4 months.

## 2024-09-05 ENCOUNTER — NURSE TRIAGE (OUTPATIENT)
Age: 84
End: 2024-09-05

## 2024-09-05 NOTE — TELEPHONE ENCOUNTER
"Scheduled for tomorrow   Reason for Disposition   MILD pain (e.g., does not interfere with normal activities) and pain comes and goes (cramps) lasts > 48 hours (Exception: this same abdominal pain is a chronic symptom recurrent or ongoing AND present > 4 weeks)    Answer Assessment - Initial Assessment Questions  1. LOCATION: \"Where does it hurt?\"       Lower abdominal pain on both sides   2. RADIATION: \"Does the pain shoot anywhere else?\" (e.g., chest, back)      Denies   3. ONSET: \"When did the pain begin?\" (Minutes, hours or days ago)       Monday a week ago  4. SUDDEN: \"Gradual or sudden onset?\"      gradual  5. PATTERN \"Does the pain come and go, or is it constant?\"     - If constant: \"Is it getting better, staying the same, or worsening?\"       (Note: Constant means the pain never goes away completely; most serious pain is constant and it progresses)      - If intermittent: \"How long does it last?\" \"Do you have pain now?\"      (Note: Intermittent means the pain goes away completely between bouts)      Intermittent   6. SEVERITY: \"How bad is the pain?\"  (e.g., Scale 1-10; mild, moderate, or severe)     - MILD (1-3): doesn't interfere with normal activities, abdomen soft and not tender to touch      - MODERATE (4-7): interferes with normal activities or awakens from sleep, tender to touch      - SEVERE (8-10): excruciating pain, doubled over, unable to do any normal activities        2/10  7. RECURRENT SYMPTOM: \"Have you ever had this type of stomach pain before?\" If Yes, ask: \"When was the last time?\" and \"What happened that time?\"       As a child  , stomach flu   8. CAUSE: \"What do you think is causing the stomach pain?\"      unknown  9. RELIEVING/AGGRAVATING FACTORS: \"What makes it better or worse?\" (e.g., movement, antacids, bowel movement)      Denies   10. OTHER SYMPTOMS: \"Has there been any vomiting, diarrhea, constipation, or urine problems?\"        Fever 99.8, tired , fair appetite    Protocols used: " Abdominal Pain - Male-ADULT-[place]

## 2024-09-05 NOTE — TELEPHONE ENCOUNTER
Regarding: pain in stomach  ----- Message from Luzma WATTS sent at 9/5/2024  4:43 PM EDT -----  Lance called in stated that he has been having pain in abdomin and slight fever. Has had pain since last week Monday not sure if this has anything to do with his kidneys. He made an appointment with Tessa for tomorrow.

## 2024-09-05 NOTE — TELEPHONE ENCOUNTER
Called and spoke w pt regarding ED evaluation. He stated that he has had the abd pain for approximately 1 wk and first noticed fever at that time. Fever then subsided and he once again noticed the fever yesterday afternoon which then again subsided. I did reiterate that ED evaluation is most likely route due to STAT imaging in ED. Pt reports regular BM and pain is umbilical/hypogastric. He does not want ED eval at this time and has already been scheduled by [facility] staff for OV tomorrow. Pt states will go to ED if pain worsens or if new Sx appear.

## 2024-09-06 ENCOUNTER — APPOINTMENT (OUTPATIENT)
Dept: LAB | Facility: CLINIC | Age: 84
End: 2024-09-06
Payer: COMMERCIAL

## 2024-09-06 ENCOUNTER — OFFICE VISIT (OUTPATIENT)
Dept: FAMILY MEDICINE CLINIC | Facility: CLINIC | Age: 84
End: 2024-09-06
Payer: COMMERCIAL

## 2024-09-06 VITALS
WEIGHT: 222.2 LBS | DIASTOLIC BLOOD PRESSURE: 68 MMHG | BODY MASS INDEX: 31.81 KG/M2 | HEART RATE: 75 BPM | TEMPERATURE: 97.3 F | OXYGEN SATURATION: 96 % | SYSTOLIC BLOOD PRESSURE: 138 MMHG | HEIGHT: 70 IN

## 2024-09-06 DIAGNOSIS — E09.22: ICD-10-CM

## 2024-09-06 DIAGNOSIS — R50.9 FEVER IN ADULT: ICD-10-CM

## 2024-09-06 DIAGNOSIS — R10.84 GENERALIZED ABDOMINAL PAIN: Primary | ICD-10-CM

## 2024-09-06 DIAGNOSIS — N18.4: ICD-10-CM

## 2024-09-06 DIAGNOSIS — R10.84 GENERALIZED ABDOMINAL PAIN: ICD-10-CM

## 2024-09-06 LAB
ALBUMIN SERPL BCG-MCNC: 3.7 G/DL (ref 3.5–5)
ALP SERPL-CCNC: 56 U/L (ref 34–104)
ALT SERPL W P-5'-P-CCNC: 19 U/L (ref 7–52)
AMYLASE SERPL-CCNC: 57 IU/L (ref 29–103)
ANION GAP SERPL CALCULATED.3IONS-SCNC: 9 MMOL/L (ref 4–13)
AST SERPL W P-5'-P-CCNC: 13 U/L (ref 13–39)
BACTERIA UR QL AUTO: ABNORMAL /HPF
BASOPHILS # BLD AUTO: 0.04 THOUSANDS/ÂΜL (ref 0–0.1)
BASOPHILS NFR BLD AUTO: 0 % (ref 0–1)
BILIRUB SERPL-MCNC: 0.56 MG/DL (ref 0.2–1)
BILIRUB UR QL STRIP: NEGATIVE
BUN SERPL-MCNC: 35 MG/DL (ref 5–25)
CALCIUM SERPL-MCNC: 9.6 MG/DL (ref 8.4–10.2)
CHLORIDE SERPL-SCNC: 102 MMOL/L (ref 96–108)
CLARITY UR: CLEAR
CO2 SERPL-SCNC: 29 MMOL/L (ref 21–32)
COLOR UR: YELLOW
CREAT SERPL-MCNC: 2.15 MG/DL (ref 0.6–1.3)
EOSINOPHIL # BLD AUTO: 0.14 THOUSAND/ÂΜL (ref 0–0.61)
EOSINOPHIL NFR BLD AUTO: 1 % (ref 0–6)
ERYTHROCYTE [DISTWIDTH] IN BLOOD BY AUTOMATED COUNT: 12.8 % (ref 11.6–15.1)
GFR SERPL CREATININE-BSD FRML MDRD: 27 ML/MIN/1.73SQ M
GLUCOSE P FAST SERPL-MCNC: 114 MG/DL (ref 65–99)
GLUCOSE UR STRIP-MCNC: NEGATIVE MG/DL
HCT VFR BLD AUTO: 34.3 % (ref 36.5–49.3)
HGB BLD-MCNC: 11.1 G/DL (ref 12–17)
HGB UR QL STRIP.AUTO: NEGATIVE
HYALINE CASTS #/AREA URNS LPF: ABNORMAL /LPF
IMM GRANULOCYTES # BLD AUTO: 0.09 THOUSAND/UL (ref 0–0.2)
IMM GRANULOCYTES NFR BLD AUTO: 1 % (ref 0–2)
KETONES UR STRIP-MCNC: NEGATIVE MG/DL
LEUKOCYTE ESTERASE UR QL STRIP: NEGATIVE
LIPASE SERPL-CCNC: 105 U/L (ref 11–82)
LYMPHOCYTES # BLD AUTO: 2.08 THOUSANDS/ÂΜL (ref 0.6–4.47)
LYMPHOCYTES NFR BLD AUTO: 16 % (ref 14–44)
MCH RBC QN AUTO: 30.2 PG (ref 26.8–34.3)
MCHC RBC AUTO-ENTMCNC: 32.4 G/DL (ref 31.4–37.4)
MCV RBC AUTO: 94 FL (ref 82–98)
MONOCYTES # BLD AUTO: 1.02 THOUSAND/ÂΜL (ref 0.17–1.22)
MONOCYTES NFR BLD AUTO: 8 % (ref 4–12)
NEUTROPHILS # BLD AUTO: 9.81 THOUSANDS/ÂΜL (ref 1.85–7.62)
NEUTS SEG NFR BLD AUTO: 74 % (ref 43–75)
NITRITE UR QL STRIP: NEGATIVE
NON-SQ EPI CELLS URNS QL MICRO: ABNORMAL /HPF
NRBC BLD AUTO-RTO: 0 /100 WBCS
PH UR STRIP.AUTO: 6 [PH]
PLATELET # BLD AUTO: 199 THOUSANDS/UL (ref 149–390)
PMV BLD AUTO: 9.4 FL (ref 8.9–12.7)
POTASSIUM SERPL-SCNC: 4.8 MMOL/L (ref 3.5–5.3)
PROT SERPL-MCNC: 7.2 G/DL (ref 6.4–8.4)
PROT UR STRIP-MCNC: ABNORMAL MG/DL
RBC # BLD AUTO: 3.67 MILLION/UL (ref 3.88–5.62)
RBC #/AREA URNS AUTO: ABNORMAL /HPF
SODIUM SERPL-SCNC: 140 MMOL/L (ref 135–147)
SP GR UR STRIP.AUTO: 1.01 (ref 1–1.03)
UROBILINOGEN UR STRIP-ACNC: <2 MG/DL
WBC # BLD AUTO: 13.18 THOUSAND/UL (ref 4.31–10.16)
WBC #/AREA URNS AUTO: ABNORMAL /HPF

## 2024-09-06 PROCEDURE — 81001 URINALYSIS AUTO W/SCOPE: CPT

## 2024-09-06 PROCEDURE — 82150 ASSAY OF AMYLASE: CPT

## 2024-09-06 PROCEDURE — 83690 ASSAY OF LIPASE: CPT

## 2024-09-06 PROCEDURE — 36415 COLL VENOUS BLD VENIPUNCTURE: CPT

## 2024-09-06 PROCEDURE — 87086 URINE CULTURE/COLONY COUNT: CPT

## 2024-09-06 PROCEDURE — G2211 COMPLEX E/M VISIT ADD ON: HCPCS | Performed by: NURSE PRACTITIONER

## 2024-09-06 PROCEDURE — 80053 COMPREHEN METABOLIC PANEL: CPT | Performed by: NURSE PRACTITIONER

## 2024-09-06 PROCEDURE — 85025 COMPLETE CBC W/AUTO DIFF WBC: CPT

## 2024-09-06 PROCEDURE — 99213 OFFICE O/P EST LOW 20 MIN: CPT | Performed by: NURSE PRACTITIONER

## 2024-09-06 NOTE — PROGRESS NOTES
Assessment/Plan:    1. Generalized abdominal pain  -     Urine culture; Future  -     Urinalysis with microscopic; Future  -     Comprehensive metabolic panel  -     Lipase; Future  -     Amylase; Future  -     CBC and differential; Future  2. Fever in adult  -     Urine culture; Future  -     Urinalysis with microscopic; Future  -     Comprehensive metabolic panel  -     Lipase; Future  -     Amylase; Future  -     CBC and differential; Future  3. 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 I have reviewed the instructions with the patient, answering all questions to his satisfaction.    Had a lengthy discussion with the patient  Reassured as his symptoms are improving greatly  Symptoms suggestive of viral illness since his wife had similar  Exam unremarkable except for mild periumbilical tenderness  I do recommend comprehensive testing to rule out underlying issue-he will go directly to [place]  I will call prior to end of office hours today to review results-treatment plan to be determined  Answered all of his questions    There are no Patient Instructions on file for this visit.    No follow-ups on file.    Subjective:      Patient ID: Jeremie Adams is a 84 y.o. male.    Chief Complaint   Patient presents with    Abdominal Pain     Started wk ago Monday -- did have slight fever, reports it is on and off.        84-year-old male known to me presents for same-day sick visit  He reports on Monday he started with lower abdominal pain, low-grade fever  He  was experiencing constipation  He ingested prunes.  Was able to move his bowels  He felt reduced energy  Had persistent abdominal pain decreased appetite through the week  Increased urination frequency as well  Denies headache, congestion, cough, body aches  Today reports that he is significantly improvement of symptoms  His wife experience similar symptoms few days after his initial onset of symptoms  Denies recent travel.  No known ingestion of contaminated food        The following portions of the patient's history were reviewed and updated as appropriate: allergies, current medications, past family history, past medical history, past social history, past surgical history and problem list.    Review of Systems   Constitutional:  Positive for appetite change, fatigue and fever.   HENT: Negative.     Respiratory:  Negative for cough and shortness of breath.    Gastrointestinal:  Positive for abdominal pain and constipation. Negative for blood in stool, diarrhea, nausea and vomiting.   Genitourinary:  Positive for dysuria, frequency and urgency. Negative for difficulty urinating and hematuria.   Musculoskeletal:  Negative for back pain.   Skin:  Negative for rash.   Neurological:  Negative for dizziness, weakness and headaches.         Current Outpatient Medications   Medication Sig Dispense Refill    aspirin 325 mg tablet Take 325 mg by mouth daily      Biotin 5 MG TABS Take by mouth daily      carvedilol (COREG) 12.5 mg tablet Take 0.5 tablets (6.25 mg total) by mouth 2 (two) times a day (Patient taking differently: Take 6.25 mg by mouth in the morning)      cholecalciferol (VITAMIN D3) 1,000 units tablet Take 1,000 Units by mouth 2 (two) times a day      cloNIDine (CATAPRES-TTS-1) 0.1 mg/24 hr Place 1 patch (0.1 mg total) on the skin over 7 days once a week 12 patch 1    co-enzyme Q-10 30 MG capsule Take 100 mg by mouth daily       ezetimibe (ZETIA) 10 mg tablet Take 1 tablet (10 mg total) by mouth daily 100  "tablet 1    GLUCOSAMINE-CHONDROITIN PO Take 2 tablets by mouth daily      losartan (COZAAR) 25 mg tablet Take 1 tablet (25 mg total) by mouth 2 (two) times a day 180 tablet 3    LUTEIN-ZEAXANTHIN-BILBERRY PO Take by mouth 2 (two) times a day      milk thistle 175 MG tablet Take 175 mg by mouth daily      Misc Natural Products (OSTEO BI-FLEX TRIPLE STRENGTH PO) Take 1 tablet by mouth 2 (two) times a day      multivitamin (THERAGRAN) TABS Take 1 tablet by mouth daily      Omega-3 Fatty Acids (FISH OIL) 1,000 mg Take 1,000 mg by mouth daily      pravastatin (PRAVACHOL) 40 mg tablet TAKE ONE TABLET BY MOUTH EVERY DAY 90 tablet 1    spironolactone (ALDACTONE) 25 mg tablet TAKE ONE TABLET BY MOUTH EVERY OTHER DAY 45 tablet 1    Taurine 500 MG CAPS Take by mouth daily      thiamine (VITAMIN B1) 100 mg tablet Take 100 mg by mouth daily      torsemide (DEMADEX) 10 mg tablet Take 1.5 tablets (15 mg total) by mouth daily 135 tablet 3    Tradjenta 5 MG TABS Take 5 mg by mouth daily 30 tablet 5    TURMERIC PO Take by mouth        vitamin E 100 UNIT capsule Take 100 Units by mouth daily         No current facility-administered medications for this visit.       Objective:    /68 (BP Location: Left arm, Patient Position: Sitting, Cuff Size: Standard)   Pulse 75   Temp (!) 97.3 °F (36.3 °C) (Temporal)   Ht 5' 10\" (1.778 m)   Wt 101 kg (222 lb 3.2 oz)   SpO2 96%   BMI 31.88 kg/m²        Physical Exam  Vitals and nursing note reviewed.   Constitutional:       General: He is not in acute distress.     Appearance: He is well-developed. He is not ill-appearing.   HENT:      Mouth/Throat:      Mouth: Mucous membranes are moist.   Cardiovascular:      Rate and Rhythm: Normal rate and regular rhythm.      Heart sounds: Normal heart sounds.   Pulmonary:      Effort: Pulmonary effort is normal.      Breath sounds: Normal breath sounds.   Abdominal:      General: Bowel sounds are normal. There is no distension.      Palpations: " Abdomen is soft. There is no hepatomegaly.      Tenderness: There is abdominal tenderness in the periumbilical area. There is no right CVA tenderness, left CVA tenderness, guarding or rebound.   Skin:     General: Skin is warm and dry.      Coloration: Skin is not pale.      Findings: No rash.   Neurological:      General: No focal deficit present.      Mental Status: He is alert.                JEOVANY Almodovar

## 2024-09-08 LAB — BACTERIA UR CULT: NORMAL

## 2024-09-12 DIAGNOSIS — E78.2 MIXED HYPERLIPIDEMIA: ICD-10-CM

## 2024-09-12 RX ORDER — PRAVASTATIN SODIUM 40 MG
TABLET ORAL
Qty: 90 TABLET | Refills: 1 | Status: SHIPPED | OUTPATIENT
Start: 2024-09-12

## 2024-11-14 DIAGNOSIS — I12.9 BENIGN HYPERTENSION WITH CHRONIC KIDNEY DISEASE, STAGE III (HCC): ICD-10-CM

## 2024-11-14 DIAGNOSIS — N18.30 BENIGN HYPERTENSION WITH CHRONIC KIDNEY DISEASE, STAGE III (HCC): ICD-10-CM

## 2024-11-14 RX ORDER — SPIRONOLACTONE 25 MG/1
25 TABLET ORAL EVERY OTHER DAY
Qty: 45 TABLET | Refills: 1 | Status: SHIPPED | OUTPATIENT
Start: 2024-11-14

## 2024-11-16 NOTE — PROGRESS NOTES
Continous glucose monitoring casey placement     Date/Time 8/2/2023 11:00 AM     Performed by  Jean Carlos Frazier   Authorized by Alona Morris MD     [facility] Protocol   Consent: Verbal consent obtained. Written consent obtained. Consent given by: patient  Timeout called at: 8/2/2023 11:35 AM.  Patient understanding: patient states understanding of the procedure being performed  Patient consent: the patient's understanding of the procedure matches consent given  Procedure consent: procedure consent matches procedure scheduled  Relevant documents: relevant documents present and verified  Test results: test results available and properly labeled  Site marked: the operative site was marked  Radiology Images displayed and confirmed.  If images not available, report reviewed: imaging studies not available  Patient identity confirmed: verbally with patient        Local anesthesia used: no     Anesthesia   Local anesthesia used: no     Sedation   Patient sedated: no        Specimen: no    Culture: no   Procedure Details   Procedure Notes: Left Arm This note was copied from a baby's chart.  Lactation Admission Note      Baby Information:  Infant's first name:  Teresita   Infant medical history: Born at 31w6d, RDS.      needed? No    Lactation goal (if known): Breastfeeding   Lactation history: First     Mother's Information: Name: Vanessa  Occupation:    Age: 29  Delivery type:     [place]: [place]   Pump for home use: Recommend [employer]     Partner's name: Ang  Occupation:      Relevant maternal medical and social hx:       Information for the patient's mother:  Vanessa Turcios Who [[id number]]     Past Medical History:   Diagnosis Date    Acne     Development delay     Elevated liver enzymes 10/25/2024    Encounter for supervision of low-risk first pregnancy in third trimester 10/25/2024    Genital herpes 2013    Gestational HTN, third trimester 10/25/2024   ,   Information for the patient's mother:  Vanessa Turcios Who [[id number]]     Patient Active Problem List   Diagnosis    Genital herpes    Supervision of high risk pregnancy in third trimester    Elevated liver enzymes    Gestational HTN, third trimester    Encounter for triage in pregnant patient    Encounter for supervision of low-risk first pregnancy in third trimester   , and   Information for the patient's mother:  Vanessa Turcios Who [[id number]]     Medications Prior to Admission   Medication Sig Dispense Refill Last Dose/Taking    Magnesium Glycinate 120 MG CAPS Take 120 mg by mouth daily.   11/10/2024    Prenatal Vit-Fe Fumarate-FA (PRENATAL MULTIVITAMIN  PLUS IRON) 27-1 MG TABS Take by mouth daily   2024    desoximetasone (TOPICORT) 0.25 % OINT ointment Apply topically 2 times daily as needed for inflammation.       valACYclovir (VALTREX) 500 MG tablet Take 1 tablet (500 mg) by mouth 2 times daily (Patient not taking: Reported on 2024) 6 tablet 3 Not Taking         Relevant maternal medications:   Nifedipine     Maternal risk factors:  Hypertension (details)  Pre-Eclampsia   Surgical birth     Admission Education given:  [x]Admission packet  [x]Kangaroo care  [x]Benefits of breast milk  [x]How breast milk is made  [x]Stages of milk production  [x]Milk supply/ goal volumes  [x]Hand expression  [x]Hands-on pumping  [x]Collecting, labeling, transporting milk  [x]Cleaning, sanitizing pump parts  [x]Storage of milk  [x]Benefits and use of pasteurized donor human milk    I met with Vanessa for lactation admission education, and assisted with making of hands free pumping band, pumping session, followed by hand expression for small puddles.     OUSMANE Salguero, RN, IBCLC   Lactation Consultant  [place]: [facility] [phone]  Office: [phone]

## 2024-12-03 DIAGNOSIS — E78.2 MIXED HYPERLIPIDEMIA: ICD-10-CM

## 2024-12-03 RX ORDER — CARVEDILOL 12.5 MG/1
12.5 TABLET ORAL 2 TIMES DAILY
Qty: 180 TABLET | Refills: 1 | Status: SHIPPED | OUTPATIENT
Start: 2024-12-03

## 2024-12-12 DIAGNOSIS — N18.30 BENIGN HYPERTENSION WITH CHRONIC KIDNEY DISEASE, STAGE III (HCC): ICD-10-CM

## 2024-12-12 DIAGNOSIS — I12.9 BENIGN HYPERTENSION WITH CHRONIC KIDNEY DISEASE, STAGE III (HCC): ICD-10-CM

## 2024-12-13 RX ORDER — LOSARTAN POTASSIUM 25 MG/1
25 TABLET ORAL 2 TIMES DAILY
Qty: 180 TABLET | Refills: 1 | Status: SHIPPED | OUTPATIENT
Start: 2024-12-13

## 2024-12-20 ENCOUNTER — RA CDI HCC (OUTPATIENT)
Dept: OTHER | Facility: HOSPITAL | Age: 84
End: 2024-12-20

## 2024-12-30 ENCOUNTER — OFFICE VISIT (OUTPATIENT)
Dept: FAMILY MEDICINE CLINIC | Facility: CLINIC | Age: 84
End: 2024-12-30
Payer: COMMERCIAL

## 2024-12-30 VITALS
HEIGHT: 70 IN | DIASTOLIC BLOOD PRESSURE: 64 MMHG | HEART RATE: 76 BPM | RESPIRATION RATE: 16 BRPM | WEIGHT: 233.6 LBS | SYSTOLIC BLOOD PRESSURE: 130 MMHG | BODY MASS INDEX: 33.44 KG/M2 | TEMPERATURE: 97.4 F | OXYGEN SATURATION: 95 %

## 2024-12-30 DIAGNOSIS — M25.552 BILATERAL HIP PAIN: ICD-10-CM

## 2024-12-30 DIAGNOSIS — M25.551 BILATERAL HIP PAIN: ICD-10-CM

## 2024-12-30 DIAGNOSIS — N18.4 BENIGN HYPERTENSION WITH CHRONIC KIDNEY DISEASE, STAGE IV (HCC): Primary | ICD-10-CM

## 2024-12-30 DIAGNOSIS — I12.9 BENIGN HYPERTENSION WITH CHRONIC KIDNEY DISEASE, STAGE IV (HCC): Primary | ICD-10-CM

## 2024-12-30 DIAGNOSIS — Z79.4 TYPE 2 DIABETES MELLITUS WITHOUT COMPLICATION, WITH LONG-TERM CURRENT USE OF INSULIN (HCC): ICD-10-CM

## 2024-12-30 DIAGNOSIS — Z23 ENCOUNTER FOR IMMUNIZATION: ICD-10-CM

## 2024-12-30 DIAGNOSIS — E11.9 TYPE 2 DIABETES MELLITUS WITHOUT COMPLICATION, WITH LONG-TERM CURRENT USE OF INSULIN (HCC): ICD-10-CM

## 2024-12-30 PROCEDURE — 90662 IIV NO PRSV INCREASED AG IM: CPT | Performed by: NURSE PRACTITIONER

## 2024-12-30 PROCEDURE — G0008 ADMIN INFLUENZA VIRUS VAC: HCPCS | Performed by: NURSE PRACTITIONER

## 2024-12-30 PROCEDURE — 99214 OFFICE O/P EST MOD 30 MIN: CPT | Performed by: NURSE PRACTITIONER

## 2024-12-30 NOTE — PROGRESS NOTES
Name: Jeremie Adams      : 1940      MRN: [id number]  Encounter Provider: JEOVANY Almodovar  Encounter Date: 2024   Encounter department: [facility]  :  Assessment & Plan  Encounter for immunization    Orders:    influenza vaccine, high-dose, PF 0.5 mL (Fluzone High Dose)    Type 2 diabetes mellitus without complication, with long-term current use of insulin (HCC)  Comanaged with endocrine-next appointment 2/3  Lab Results   Component Value Date    HGBA1C 7.2 (H) 2024       Orders:    Hemoglobin A1C; Future    Bilateral hip pain  Painful gait-interested in exploring options-injections versus surgery  Orders:    Ambulatory Referral to Orthopedic Surgery; Future    Benign hypertension with chronic kidney disease, stage IV (HCC)  Lab Results   Component Value Date    EGFR 27 2024    EGFR 27 2024    EGFR 25 2024    CREATININE 2.15 (H) 2024    CREATININE 2.18 (H) 2024    CREATININE 2.27 (H) 2024   Blood pressure controlled today  Continue current treatment         Patient clinically stable at this time.  Cont with current plan of care.   RTO as recommended and PRN       History of Present Illness     83-year-old male with history of diabetes, chronic kidney disease, dyslipidemia presents for 6 month f/o  Is managed by endocrine, cardiology, nephrology regularly    Given his usual state of health  Seeing nephro in 2 weeks    Is due for comprehensive blood work= I will add DM labs    He endorses that bilateral hip pain is getting worse.  He is quite antalgic  He is interested in meeting with orthopedic surgeon to discuss options-injections versus surgery    Interested in flu shot today    Eye exam up-to-date-will call for exam      Review of Systems   Constitutional:  Positive for fatigue. Negative for fever and unexpected weight change.   HENT:  Negative for trouble swallowing.    Respiratory:  Positive for shortness of breath (WALKER--no  "better no worse). Negative for cough.    Cardiovascular:  Positive for leg swelling (chronic). Negative for chest pain and palpitations.   Gastrointestinal:  Negative for abdominal pain and blood in stool.   Endocrine: Negative.    Genitourinary:  Negative for decreased urine volume, difficulty urinating and hematuria.   Musculoskeletal:  Positive for arthralgias and gait problem (b/l hip pain-getting worse).   Skin:  Negative for pallor.   Neurological:  Negative for dizziness, syncope and weakness.   Hematological:  Does not bruise/bleed easily.   Psychiatric/Behavioral:  Negative for confusion.        Objective   /64   Pulse 76   Temp (!) 97.4 °F (36.3 °C) (Temporal)   Resp 16   Ht 5' 10\" (1.778 m)   Wt 106 kg (233 lb 9.6 oz)   SpO2 95%   BMI 33.52 kg/m²      Physical Exam  Vitals and nursing note reviewed.   Constitutional:       General: He is not in acute distress.     Appearance: Normal appearance.   Cardiovascular:      Rate and Rhythm: Normal rate and regular rhythm.      Pulses: Normal pulses. no weak pulses.           Dorsalis pedis pulses are 2+ on the right side and 2+ on the left side.        Posterior tibial pulses are 2+ on the right side and 2+ on the left side.      Heart sounds: Murmur heard.   Pulmonary:      Effort: Pulmonary effort is normal.      Breath sounds: Normal breath sounds.   Abdominal:      Palpations: Abdomen is soft.      Tenderness: There is no abdominal tenderness.   Musculoskeletal:      Right lower leg: Edema present.      Left lower leg: Edema present.   Feet:      Right foot:      Skin integrity: No ulcer, skin breakdown, erythema, warmth, callus or dry skin.      Left foot:      Skin integrity: No ulcer, skin breakdown, erythema, warmth, callus or dry skin.   Lymphadenopathy:      Cervical: No cervical adenopathy.   Skin:     General: Skin is warm and dry.      Coloration: Skin is not pale.   Neurological:      General: No focal deficit present.      Mental " Status: He is alert. Mental status is at baseline.      Motor: No weakness.      Gait: Gait abnormal (antalgic).   Psychiatric:         Mood and Affect: Mood normal.         Behavior: Behavior normal.         Diabetic Foot Exam    Patient's shoes and socks removed.    Right Foot/Ankle   Right Foot Inspection  Skin Exam: skin normal and skin intact. No dry skin, no warmth, no callus, no erythema, no maceration, no abnormal color, no pre-ulcer, no ulcer and no callus.     Toe Exam: ROM and strength within normal limits.     Sensory   Monofilament testing: intact    Vascular  Capillary refills: < 3 seconds  The right DP pulse is 2+. The right PT pulse is 2+.     Left Foot/Ankle  Left Foot Inspection  Skin Exam: skin normal and skin intact. No dry skin, no warmth, no erythema, no maceration, normal color, no pre-ulcer, no ulcer and no callus.     Toe Exam: ROM and strength within normal limits.     Sensory   Monofilament testing: intact    Vascular  Capillary refills: < 3 seconds  The left DP pulse is 2+. The left PT pulse is 2+.     Assign Risk Category  No deformity present  No loss of protective sensation  No weak pulses  Risk: 0

## 2024-12-30 NOTE — ASSESSMENT & PLAN NOTE
Lab Results   Component Value Date    EGFR 27 09/06/2024    EGFR 27 07/26/2024    EGFR 25 06/24/2024    CREATININE 2.15 (H) 09/06/2024    CREATININE 2.18 (H) 07/26/2024    CREATININE 2.27 (H) 06/24/2024   Blood pressure controlled today  Continue current treatment

## 2024-12-31 ENCOUNTER — TELEPHONE (OUTPATIENT)
Dept: ADMINISTRATIVE | Facility: OTHER | Age: 84
End: 2024-12-31

## 2024-12-31 NOTE — TELEPHONE ENCOUNTER
----- Message from Gemma MAURER sent at 12/30/2024  3:48 PM EST -----  Regarding: Care gap request  12/30/24 3:48 PM    Hello, our patient Jeremie Adams has had Diabetic Eye Exam completed/performed. Please assist in updating the patient chart by making an External outreach to Dr. Fischer facility located in [place], [place] [phone]. The date of service is within past 6 months.    Thank you,  Gemma Llanes   [facility] [place]

## 2024-12-31 NOTE — TELEPHONE ENCOUNTER
Upon review of the In Basket request and the patient's chart, initial outreach has been made via fax to facility. Please see Contacts section for details.     Thank you  Anaid Hopkins MA

## 2024-12-31 NOTE — LETTER
Diabetic Eye Exam Form     Date Requested: 24  Patient: Jeremie Adams  Patient : 1940   Referring Provider: JEOVANY Almodovar      DIABETIC Eye Exam Date _______________________________      Type of Exam MUST be documented for Diabetic Eye Exams. Please CHECK ONE.     Retinal Exam       Dilated Retinal Exam       OCT       Optomap-Iris Exam      Fundus Photography       Left Eye - Please check Retinopathy or No Retinopathy        Exam did show retinopathy    Exam did not show retinopathy       Right Eye - Please check Retinopathy or No Retinopathy       Exam did show retinopathy    Exam did not show retinopathy       Comments __________________________________________________________    Practice Providing Exam ______________________________________________    Exam Performed By (print name) _______________________________________      Provider Signature ___________________________________________________      These reports are needed for  compliance.  Please fax this completed form and a copy of the Diabetic Eye Exam report to our office located at [address] [place] [address] as soon as possible via Fax [phone] ludwin Worrell: Phone [phone]  We thank you for your assistance in treating our mutual patient.

## 2025-01-02 NOTE — TELEPHONE ENCOUNTER
Upon review of the In Basket request we were able to locate, review, and update the patient chart as requested for Diabetic Eye Exam.    Any additional questions or concerns should be emailed to the Practice Liaisons via the appropriate education email address, please do not reply via In Basket.    Thank you  Anaid Hopkins MA   [facility]

## 2025-01-08 DIAGNOSIS — E11.9 TYPE 2 DIABETES MELLITUS WITHOUT COMPLICATION, WITH LONG-TERM CURRENT USE OF INSULIN (HCC): ICD-10-CM

## 2025-01-08 DIAGNOSIS — Z79.4 TYPE 2 DIABETES MELLITUS WITHOUT COMPLICATION, WITH LONG-TERM CURRENT USE OF INSULIN (HCC): ICD-10-CM

## 2025-01-08 DIAGNOSIS — N18.4 BENIGN HYPERTENSION WITH CHRONIC KIDNEY DISEASE, STAGE IV (HCC): ICD-10-CM

## 2025-01-08 DIAGNOSIS — I12.9 BENIGN HYPERTENSION WITH CHRONIC KIDNEY DISEASE, STAGE IV (HCC): ICD-10-CM

## 2025-01-08 DIAGNOSIS — E78.2 MIXED HYPERLIPIDEMIA: ICD-10-CM

## 2025-01-08 RX ORDER — CLONIDINE 0.1 MG/24H
1 PATCH, EXTENDED RELEASE TRANSDERMAL WEEKLY
Qty: 12 PATCH | Refills: 1 | Status: SHIPPED | OUTPATIENT
Start: 2025-01-08 | End: 2025-01-13 | Stop reason: SDUPTHER

## 2025-01-09 ENCOUNTER — APPOINTMENT (OUTPATIENT)
Dept: LAB | Facility: CLINIC | Age: 85
End: 2025-01-09
Payer: COMMERCIAL

## 2025-01-09 DIAGNOSIS — N18.32 STAGE 3B CHRONIC KIDNEY DISEASE (HCC): ICD-10-CM

## 2025-01-09 DIAGNOSIS — E11.9 TYPE 2 DIABETES MELLITUS WITHOUT COMPLICATION, WITH LONG-TERM CURRENT USE OF INSULIN (HCC): ICD-10-CM

## 2025-01-09 DIAGNOSIS — Z79.4 TYPE 2 DIABETES MELLITUS WITHOUT COMPLICATION, WITH LONG-TERM CURRENT USE OF INSULIN (HCC): ICD-10-CM

## 2025-01-09 LAB
ALBUMIN SERPL BCG-MCNC: 4.2 G/DL (ref 3.5–5)
ANION GAP SERPL CALCULATED.3IONS-SCNC: 10 MMOL/L (ref 4–13)
BUN SERPL-MCNC: 51 MG/DL (ref 5–25)
CALCIUM SERPL-MCNC: 10.4 MG/DL (ref 8.4–10.2)
CHLORIDE SERPL-SCNC: 104 MMOL/L (ref 96–108)
CO2 SERPL-SCNC: 28 MMOL/L (ref 21–32)
CREAT SERPL-MCNC: 2.73 MG/DL (ref 0.6–1.3)
CREAT UR-MCNC: 120 MG/DL
CREAT UR-MCNC: 120 MG/DL
GFR SERPL CREATININE-BSD FRML MDRD: 20 ML/MIN/1.73SQ M
GLUCOSE P FAST SERPL-MCNC: 151 MG/DL (ref 65–99)
MAGNESIUM SERPL-MCNC: 2.1 MG/DL (ref 1.9–2.7)
MICROALBUMIN UR-MCNC: 190.1 MG/L
MICROALBUMIN/CREAT 24H UR: 158 MG/G CREATININE (ref 0–30)
PHOSPHATE SERPL-MCNC: 4 MG/DL (ref 2.3–4.1)
POTASSIUM SERPL-SCNC: 4.1 MMOL/L (ref 3.5–5.3)
PROT UR-MCNC: 30.4 MG/DL
PROT/CREAT UR: 0.3 MG/G{CREAT} (ref 0–0.1)
SODIUM SERPL-SCNC: 142 MMOL/L (ref 135–147)

## 2025-01-09 PROCEDURE — 82570 ASSAY OF URINE CREATININE: CPT

## 2025-01-09 PROCEDURE — 83036 HEMOGLOBIN GLYCOSYLATED A1C: CPT

## 2025-01-09 PROCEDURE — 36415 COLL VENOUS BLD VENIPUNCTURE: CPT

## 2025-01-09 PROCEDURE — 80069 RENAL FUNCTION PANEL: CPT

## 2025-01-09 PROCEDURE — 82043 UR ALBUMIN QUANTITATIVE: CPT

## 2025-01-09 PROCEDURE — 84156 ASSAY OF PROTEIN URINE: CPT

## 2025-01-09 PROCEDURE — 83735 ASSAY OF MAGNESIUM: CPT

## 2025-01-09 RX ORDER — LINAGLIPTIN 5 MG/1
5 TABLET, FILM COATED ORAL DAILY
Qty: 30 TABLET | Refills: 5 | Status: SHIPPED | OUTPATIENT
Start: 2025-01-09

## 2025-01-09 RX ORDER — EZETIMIBE 10 MG/1
10 TABLET ORAL DAILY
Qty: 30 TABLET | Refills: 5 | Status: SHIPPED | OUTPATIENT
Start: 2025-01-09 | End: 2025-07-08

## 2025-01-10 LAB
EST. AVERAGE GLUCOSE BLD GHB EST-MCNC: 154 MG/DL
HBA1C MFR BLD: 7 %

## 2025-01-13 ENCOUNTER — OFFICE VISIT (OUTPATIENT)
Dept: NEPHROLOGY | Facility: CLINIC | Age: 85
End: 2025-01-13
Payer: COMMERCIAL

## 2025-01-13 VITALS
DIASTOLIC BLOOD PRESSURE: 78 MMHG | SYSTOLIC BLOOD PRESSURE: 144 MMHG | HEIGHT: 70 IN | WEIGHT: 233 LBS | HEART RATE: 74 BPM | OXYGEN SATURATION: 96 % | BODY MASS INDEX: 33.36 KG/M2

## 2025-01-13 DIAGNOSIS — N18.32 STAGE 3B CHRONIC KIDNEY DISEASE (HCC): ICD-10-CM

## 2025-01-13 DIAGNOSIS — M89.9 CHRONIC KIDNEY DISEASE-MINERAL AND BONE DISORDER: ICD-10-CM

## 2025-01-13 DIAGNOSIS — I12.9 TYPE 2 DM WITH CKD STAGE 4 AND HYPERTENSION (HCC): Primary | ICD-10-CM

## 2025-01-13 DIAGNOSIS — I12.9 BENIGN HYPERTENSION WITH CHRONIC KIDNEY DISEASE: ICD-10-CM

## 2025-01-13 DIAGNOSIS — E83.9 CHRONIC KIDNEY DISEASE-MINERAL AND BONE DISORDER: ICD-10-CM

## 2025-01-13 DIAGNOSIS — R80.9 NON-NEPHROTIC RANGE PROTEINURIA: ICD-10-CM

## 2025-01-13 DIAGNOSIS — N18.4 TYPE 2 DM WITH CKD STAGE 4 AND HYPERTENSION (HCC): Primary | ICD-10-CM

## 2025-01-13 DIAGNOSIS — E11.22 TYPE 2 DM WITH CKD STAGE 4 AND HYPERTENSION (HCC): Primary | ICD-10-CM

## 2025-01-13 DIAGNOSIS — N18.9 CHRONIC KIDNEY DISEASE-MINERAL AND BONE DISORDER: ICD-10-CM

## 2025-01-13 PROBLEM — E11.69 TYPE 2 DIABETES MELLITUS WITH OBESITY  (HCC): Status: ACTIVE | Noted: 2025-01-13

## 2025-01-13 PROBLEM — E66.9 TYPE 2 DIABETES MELLITUS WITH OBESITY  (HCC): Status: ACTIVE | Noted: 2025-01-13

## 2025-01-13 PROCEDURE — 99214 OFFICE O/P EST MOD 30 MIN: CPT | Performed by: INTERNAL MEDICINE

## 2025-01-13 RX ORDER — TORSEMIDE 20 MG/1
20 TABLET ORAL DAILY
Qty: 90 TABLET | Refills: 1 | Status: SHIPPED | OUTPATIENT
Start: 2025-01-13

## 2025-01-13 RX ORDER — CARVEDILOL 12.5 MG/1
12.5 TABLET ORAL 2 TIMES DAILY
Start: 2025-01-13

## 2025-01-13 RX ORDER — CLONIDINE 0.1 MG/24H
1 PATCH, EXTENDED RELEASE TRANSDERMAL WEEKLY
Start: 2025-01-13

## 2025-01-13 RX ORDER — LOSARTAN POTASSIUM 25 MG/1
25 TABLET ORAL DAILY
Start: 2025-01-13 | End: 2025-01-13 | Stop reason: SDUPTHER

## 2025-01-13 RX ORDER — LOSARTAN POTASSIUM 25 MG/1
25 TABLET ORAL DAILY
Qty: 30 TABLET | Refills: 1 | Status: SHIPPED | OUTPATIENT
Start: 2025-01-13

## 2025-01-13 NOTE — PROGRESS NOTES
NEPHROLOGY OFFICE PROGRESS NOTE   Jeremie Adams 84 y.o. male MRN: [id number]  DATE: 01/13/25  Reason for visit: Continued evaluation and management of CKD.     Assessment & Plan  Stage 3b chronic kidney disease (HCC)  Renal function is worse. Creatinine up to 2.73.  Stop spironolactone and decrease losartan to 25 mg OD.   Repeat BMP in 2 weeks.   Benign hypertension with chronic kidney disease  BP is close to goal.   As above, stop Spironolactone and decrease Losartan due to rise in SCr.   Has more edema on exam today.   Increase Torsemide to 20 mg OD and recheck BMP.   Non-nephrotic range proteinuria  UPC ratio and urine ACR readings are at goal.  Stop spironolactone and decrease losartan as above due to rising creatinine.  Type 2 DM with CKD stage 4 and hypertension (HCC)  Glycemic control is at goal.  Not insulin-dependent.  Chronic kidney disease-mineral and bone disorder  Check PTH and vitamin D with next visit.    RENAL PERTINENT HISTORY:  CKD IV  Baseline creatinine was 1.6 to 2.0 but had gone up to 2.3 in early 2023.   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   Creatinine range seems to be around 1.7 to 2.2 now.   Completed CKD education.    HTN  No hyperaldosteronism on work up.     Current BP/diuretic medications:  Torsemide 5 mg in AM and 10 mg in PM  Clonidine 0.1 mg patch  Carvedilol 6.25 mg BID  Spironolactone 25 mg QOD  Losartan 25 mg BID.     Renal pertinent medications:  Vitamin D 2000 units daily.     Patient Instructions   Your most recent creatinine is up to 2.73.  Please stop the Spironolactone.   Please decrease Losartan to 25 mg daily.   For your leg swelling, increase Torsemide to 20 mg daily.   Repeat BMP in 2 weeks.   Call me if your weight does not go down with the increase in Torsemide.   Follow up in 3 months.     SUBJECTIVE / INTERVAL HISTORY:  Lance was last seen in the office in Aug 2024.   No unplanned hospital stays or ER visits.   Home BP has been around 120 to 130/70s mm  "Hg.   Weight loss has leveled off.     BP cuff calibration 1/13/25  Home BP cuff (wrist) 149/80  Office BP cuff 160/84     PMH/PSH: HTN, DM, HLP, CAD, AS, LEONORA on CPAP, leg edema, BPH, DJD, SBO, macular degeneration, tonsillectomy, R inguinal hernia, retinal surgery.      Previous work up:   November 1, 2019:  Aldosterone 6, plasma renin activity 0.22, UPC ratio 0.85, metanephrines 34, normetanephrines 83, TSH 1.53     10/25/19 Renal US: R 12.9 cm, L 14.3 cm, bilateral cortical cysts.  No hydronephrosis.    ALLERGIES: No Known Allergies    REVIEW OF SYSTEMS:  Review of Systems   Constitutional:  Negative for appetite change, chills, fatigue and fever.   Respiratory:  Negative for cough and shortness of breath.    Cardiovascular:  Negative for chest pain and leg swelling.   Gastrointestinal:  Negative for abdominal pain, diarrhea, nausea and vomiting.   Genitourinary:  Negative for dysuria and hematuria.   Musculoskeletal:  Negative for arthralgias.   Neurological:  Negative for dizziness and light-headedness.     OBJECTIVE:  /78 (BP Location: Left arm, Patient Position: Sitting, Cuff Size: Large)   Pulse 74   Ht 5' 10\" (1.778 m)   Wt 106 kg (233 lb)   SpO2 96%   BMI 33.43 kg/m²   Current Weight: Weight - Scale: 106 kg (233 lb) Body mass index is 33.43 kg/m².  Physical Exam  Constitutional:       General: He is not in acute distress.     Appearance: Normal appearance. He is well-developed. He is obese. He is not ill-appearing or diaphoretic.   HENT:      Head: Normocephalic and atraumatic.   Eyes:      General: No scleral icterus.     Conjunctiva/sclera: Conjunctivae normal.   Neck:      Vascular: No JVD.   Cardiovascular:      Rate and Rhythm: Normal rate and regular rhythm.      Heart sounds: Murmur heard.   Pulmonary:      Effort: Pulmonary effort is normal. No respiratory distress.      Breath sounds: Rales (fine rales in bases) present.   Abdominal:      General: Bowel sounds are normal.      " Palpations: Abdomen is soft.   Musculoskeletal:      Cervical back: Neck supple.      Comments: + mild bilateral LE edema.    Skin:     General: Skin is warm and dry.   Neurological:      Mental Status: He is alert and oriented to person, place, and time.   Psychiatric:         Mood and Affect: Mood normal.         Behavior: Behavior normal.         Judgment: Judgment normal.       Medications:  Current Outpatient Medications:     aspirin 325 mg tablet, Take 325 mg by mouth daily, Disp: , Rfl:     Biotin 5 MG TABS, Take by mouth daily, Disp: , Rfl:     carvedilol (COREG) 12.5 mg tablet, Take 1 tablet (12.5 mg total) by mouth 2 (two) times a day, Disp: 180 tablet, Rfl: 1    cholecalciferol (VITAMIN D3) 1,000 units tablet, Take 1,000 Units by mouth 2 (two) times a day, Disp: , Rfl:     cloNIDine (CATAPRES-TTS-1) 0.1 mg/24 hr, Place 1 patch (0.1 mg total) on the skin over 7 days once a week, Disp: 12 patch, Rfl: 1    co-enzyme Q-10 30 MG capsule, Take 100 mg by mouth daily , Disp: , Rfl:     ezetimibe (ZETIA) 10 mg tablet, Take 1 tablet (10 mg total) by mouth daily, Disp: 30 tablet, Rfl: 5    GLUCOSAMINE-CHONDROITIN PO, Take 2 tablets by mouth daily, Disp: , Rfl:     losartan (COZAAR) 25 mg tablet, Take 1 tablet (25 mg total) by mouth 2 (two) times a day, Disp: 180 tablet, Rfl: 1    LUTEIN-ZEAXANTHIN-BILBERRY PO, Take by mouth 2 (two) times a day, Disp: , Rfl:     milk thistle 175 MG tablet, Take 175 mg by mouth daily, Disp: , Rfl:     Misc Natural Products (OSTEO BI-FLEX TRIPLE STRENGTH PO), Take 1 tablet by mouth 2 (two) times a day, Disp: , Rfl:     multivitamin (THERAGRAN) TABS, Take 1 tablet by mouth daily, Disp: , Rfl:     Omega-3 Fatty Acids (FISH OIL) 1,000 mg, Take 1,000 mg by mouth daily, Disp: , Rfl:     pravastatin (PRAVACHOL) 40 mg tablet, TAKE ONE TABLET BY MOUTH EVERY DAY, Disp: 90 tablet, Rfl: 1    spironolactone (ALDACTONE) 25 mg tablet, TAKE ONE TABLET BY MOUTH EVERY OTHER DAY, Disp: 45 tablet, Rfl:  1    Taurine 500 MG CAPS, Take by mouth daily, Disp: , Rfl:     thiamine (VITAMIN B1) 100 mg tablet, Take 100 mg by mouth daily, Disp: , Rfl:     torsemide (DEMADEX) 10 mg tablet, Take 1.5 tablets (15 mg total) by mouth daily, Disp: 135 tablet, Rfl: 3    Tradjenta 5 MG TABS, Take 5 mg by mouth daily, Disp: 30 tablet, Rfl: 5    TURMERIC PO, Take by mouth  , Disp: , Rfl:     vitamin E 100 UNIT capsule, Take 100 Units by mouth daily  , Disp: , Rfl:     Laboratory Results:  Results for orders placed or performed in visit on 01/09/25   Magnesium    Collection Time: 01/09/25 12:36 PM   Result Value Ref Range    Magnesium 2.1 1.9 - 2.7 mg/dL   Renal function panel    Collection Time: 01/09/25 12:36 PM   Result Value Ref Range    Albumin 4.2 3.5 - 5.0 g/dL    Calcium 10.4 (H) 8.4 - 10.2 mg/dL    Phosphorus 4.0 2.3 - 4.1 mg/dL    BUN 51 (H) 5 - 25 mg/dL    Creatinine 2.73 (H) 0.60 - 1.30 mg/dL    Sodium 142 135 - 147 mmol/L    Potassium 4.1 3.5 - 5.3 mmol/L    Chloride 104 96 - 108 mmol/L    CO2 28 21 - 32 mmol/L    ANION GAP 10 4 - 13 mmol/L    eGFR 20 ml/min/1.73sq m    Glucose, Fasting 151 (H) 65 - 99 mg/dL   Albumin / creatinine urine ratio    Collection Time: 01/09/25 12:36 PM   Result Value Ref Range    Creatinine, Ur 120.0 Reference range not established. mg/dL    Albumin,U,Random 190.1 (H) <20.0 mg/L    Albumin Creat Ratio 158 (H) 0 - 30 mg/g creatinine   Protein / creatinine ratio, urine    Collection Time: 01/09/25 12:36 PM   Result Value Ref Range    Creatinine, Ur 120.0 Reference range not established. mg/dL    Protein Urine Random 30.4 Reference range not established. mg/dL    Prot/Creat Ratio, Ur 0.3 (H) 0.0 - 0.1   Hemoglobin A1C    Collection Time: 01/09/25 12:36 PM   Result Value Ref Range    Hemoglobin A1C 7.0 (H) Normal 4.0-5.6%; PreDiabetic 5.7-6.4%; Diabetic >=6.5%; Glycemic control for adults with diabetes <7.0% %     mg/dl

## 2025-01-13 NOTE — PATIENT INSTRUCTIONS
Your most recent creatinine is up to 2.73.  Please stop the Spironolactone.   Please decrease Losartan to 25 mg daily.   For your leg swelling, increase Torsemide to 20 mg daily.   Repeat BMP in 2 weeks.   Call me if your weight does not go down with the increase in Torsemide.   Follow up in 3 months.

## 2025-01-13 NOTE — ASSESSMENT & PLAN NOTE
BP is close to goal.   As above, stop Spironolactone and decrease Losartan due to rise in SCr.   Has more edema on exam today.   Increase Torsemide to 20 mg OD and recheck BMP.

## 2025-01-13 NOTE — ASSESSMENT & PLAN NOTE
Renal function is worse. Creatinine up to 2.73.  Stop spironolactone and decrease losartan to 25 mg OD.   Repeat BMP in 2 weeks.

## 2025-01-17 ENCOUNTER — HOSPITAL ENCOUNTER (EMERGENCY)
Facility: HOSPITAL | Age: 85
Discharge: HOME | End: 2025-01-17
Attending: EMERGENCY MEDICINE
Payer: COMMERCIAL

## 2025-01-17 VITALS
DIASTOLIC BLOOD PRESSURE: 96 MMHG | TEMPERATURE: 99.5 F | RESPIRATION RATE: 20 BRPM | HEART RATE: 112 BPM | SYSTOLIC BLOOD PRESSURE: 196 MMHG | OXYGEN SATURATION: 95 %

## 2025-01-17 DIAGNOSIS — K13.79 ORAL BLEEDING: Primary | ICD-10-CM

## 2025-01-17 PROCEDURE — 99284 EMERGENCY DEPT VISIT MOD MDM: CPT | Performed by: EMERGENCY MEDICINE

## 2025-01-17 PROCEDURE — 99282 EMERGENCY DEPT VISIT SF MDM: CPT

## 2025-01-17 RX ORDER — TRANEXAMIC ACID 100 MG/ML
1000 INJECTION, SOLUTION INTRAVENOUS ONCE
Status: COMPLETED | OUTPATIENT
Start: 2025-01-17 | End: 2025-01-17

## 2025-01-17 RX ADMIN — TRANEXAMIC ACID 1000 MG: 1 INJECTION, SOLUTION INTRAVENOUS at 21:18

## 2025-01-17 RX ADMIN — TRANEXAMIC ACID 1000 MG: 1 INJECTION, SOLUTION INTRAVENOUS at 19:51

## 2025-01-18 NOTE — DISCHARGE INSTRUCTIONS
Follow-up with your oral surgeon.  Please return to the emergency department if you develop bleeding that will not stop or anything else concerning to you.

## 2025-01-18 NOTE — ED PROVIDER NOTES
Time reflects when diagnosis was documented in both MDM as applicable and the Disposition within this note       Time User Action Codes Description Comment    1/17/2025  8:57 PM Elmira Hassan Add [K13.79] Oral bleeding           ED Disposition       ED Disposition   Discharge    Condition   Stable    Date/Time   Fri Jan 17, 2025  8:57 PM    Comment   Jeremie Adams discharge to home/self care.                   Assessment & Plan       Medical Decision Making  84-year-old male presenting for evaluation of oral bleeding after wisdom tooth extraction.  Bleeding noted from the left lower extraction site.  Patient otherwise asymptomatic.  TXA soaked gauze were applied to the area with pressure with ultimate resolution of the bleeding.  He was able to tolerate water without further bleeding.  After initial discharge, patient developed recurrence of bleeding.  Additional TXA soaked gauze applied along with Surgicel.  Bleeding was subsequently resolved after application.  He is otherwise stable for discharge at this time.  Advised follow-up with oral surgeon.  Return precautions discussed.    Problems Addressed:  Oral bleeding: acute illness or injury    Risk  Prescription drug management.        ED Course as of 01/17/25 2236 Fri Jan 17, 2025 1948 TXA soaked gauze applied to area with pressure.   2020 TXA soaked gauze removed. No active bleeding currently. Will monitor.   2041 Patient re-evaluated. No further bleeding noted. Will trial drinking water.   2057 No further bleeding after drinking water.   2107 Patient developed bleeding again upon attempting to exit the ED.    2115 TXA soaked gauze along with surgicel placed.   2156 Gauze removed. No active bleeding currently.   2226 No further bleeding.       Medications   tranexamic acid 100mg/mL (TOPICAL/EPISTAXIS) 1,000 mg (1,000 mg Topical Given 1/17/25 1951)   tranexamic acid 100mg/mL (TOPICAL/EPISTAXIS) 1,000 mg (1,000 mg Topical Given 1/17/25 2118)       ED Risk  Strat Scores                          SBIRT 22yo+      Flowsheet Row Most Recent Value   Initial Alcohol Screen: US AUDIT-C     1. How often do you have a drink containing alcohol? 0 Filed at: 01/17/2025 1933   2. How many drinks containing alcohol do you have on a typical day you are drinking?  0 Filed at: 01/17/2025 1933   3a. Male UNDER 65: How often do you have five or more drinks on one occasion? 0 Filed at: 01/17/2025 1933   Audit-C Score 0 Filed at: 01/17/2025 1933   TYESHA: How many times in the past year have you...    Used an illegal drug or used a prescription medication for non-medical reasons? Never Filed at: 01/17/2025 1933                            History of Present Illness       Chief Complaint   Patient presents with    Medical Problem     Pt arrived ambulatory with c/o having his bottom left wisdom tooth removed this morning. Pt states he has been bleeding since that time. Pt takes aspirin everyday       Past Medical History:   Diagnosis Date    BPH (benign prostatic hyperplasia)     Diabetes mellitus (HCC)     Diverticulosis     History of cardiac cath 2005    Hyperglycemia     Hyperlipidemia     Hypertension     Kidney stone     Macular degeneration     RIGHT    Obesity     LEONORA on CPAP     SBO (small bowel obstruction) (HCC)     Swollen ankles     LE EDEMA/VI      Past Surgical History:   Procedure Laterality Date    COLONOSCOPY  2007    HERNIA REPAIR      RETINAL DETACHMENT SURGERY      TONSILLECTOMY        Family History   Problem Relation Age of Onset    COPD Mother     Emphysema Mother     Lung cancer Father       Social History     Tobacco Use    Smoking status: Never    Smokeless tobacco: Never   Vaping Use    Vaping status: Never Used   Substance Use Topics    Alcohol use: Never    Drug use: Never      E-Cigarette/Vaping    E-Cigarette Use Never User       E-Cigarette/Vaping Substances    Nicotine No     THC No     CBD No     Flavoring No       I have reviewed and agree with the history as  documented.     84-year-old male presenting for evaluation of dental bleeding.  Patient had a wisdom tooth extraction earlier today and notes bleeding from the site since leaving the office.  He is otherwise asymptomatic at this time.  He does take aspirin daily but has not taken it for the past couple of days in anticipation of the procedure.        Review of Systems   Constitutional:  Negative for chills and fever.   HENT:  Positive for dental problem.    Respiratory:  Negative for shortness of breath.    Cardiovascular:  Negative for chest pain.   Gastrointestinal:  Negative for abdominal pain.   Genitourinary:  Negative for flank pain.   Musculoskeletal:  Negative for gait problem.   Skin:  Negative for rash.   Neurological:  Negative for weakness and light-headedness.   All other systems reviewed and are negative.          Objective       ED Triage Vitals [01/17/25 1933]   Temperature Pulse Blood Pressure Respirations SpO2 Patient Position - Orthostatic VS   99.5 °F (37.5 °C) (!) 112 (!) 196/96 20 95 % Sitting      Temp Source Heart Rate Source BP Location FiO2 (%) Pain Score    Tympanic Monitor Left arm -- --      Vitals      Date and Time Temp Pulse SpO2 Resp BP Pain Score FACES Pain Rating User   01/17/25 1933 99.5 °F (37.5 °C) 112 95 % 20 196/96 -- -- MO            Physical Exam  Vitals reviewed.   Constitutional:       General: He is not in acute distress.     Appearance: He is not ill-appearing.   HENT:      Head: Normocephalic and atraumatic.      Nose: Nose normal.      Mouth/Throat:      Mouth: Mucous membranes are moist.     Eyes:      Conjunctiva/sclera: Conjunctivae normal.   Cardiovascular:      Rate and Rhythm: Normal rate.   Pulmonary:      Effort: Pulmonary effort is normal.   Abdominal:      General: There is no distension.   Musculoskeletal:         General: Normal range of motion.      Cervical back: Normal range of motion and neck supple.   Skin:     General: Skin is warm and dry.    Neurological:      General: No focal deficit present.      Mental Status: He is alert and oriented to person, place, and time.         Results Reviewed       None            No orders to display       Procedures    ED Medication and Procedure Management   Prior to Admission Medications   Prescriptions Last Dose Informant Patient Reported? Taking?   Biotin 5 MG TABS  Self Yes No   Sig: Take by mouth daily   GLUCOSAMINE-CHONDROITIN PO  Self Yes No   Sig: Take 2 tablets by mouth daily   LUTEIN-ZEAXANTHIN-BILBERRY PO  Self Yes No   Sig: Take by mouth 2 (two) times a day   Misc Natural Products (OSTEO BI-FLEX TRIPLE STRENGTH PO)  Self Yes No   Sig: Take 1 tablet by mouth 2 (two) times a day   Omega-3 Fatty Acids (FISH OIL) 1,000 mg  Self Yes No   Sig: Take 1,000 mg by mouth daily   TURMERIC PO  Self Yes No   Sig: Take by mouth     Taurine 500 MG CAPS  Self Yes No   Sig: Take by mouth daily   Tradjenta 5 MG TABS  Self No No   Sig: Take 5 mg by mouth daily   aspirin 325 mg tablet  Self Yes No   Sig: Take 325 mg by mouth daily   carvedilol (COREG) 12.5 mg tablet   No No   Sig: Take 1 tablet (12.5 mg total) by mouth 2 (two) times a day   cholecalciferol (VITAMIN D3) 1,000 units tablet  Self Yes No   Sig: Take 1,000 Units by mouth 2 (two) times a day   cloNIDine (CATAPRES-TTS-1) 0.1 mg/24 hr   No No   Sig: Place 1 patch (0.1 mg total) on the skin over 7 days once a week   co-enzyme Q-10 30 MG capsule  Self Yes No   Sig: Take 100 mg by mouth daily    ezetimibe (ZETIA) 10 mg tablet  Self No No   Sig: Take 1 tablet (10 mg total) by mouth daily   losartan (COZAAR) 25 mg tablet   No No   Sig: Take 1 tablet (25 mg total) by mouth daily   milk thistle 175 MG tablet  Self Yes No   Sig: Take 175 mg by mouth daily   multivitamin (THERAGRAN) TABS  Self Yes No   Sig: Take 1 tablet by mouth daily   pravastatin (PRAVACHOL) 40 mg tablet  Self No No   Sig: TAKE ONE TABLET BY MOUTH EVERY DAY   thiamine (VITAMIN B1) 100 mg tablet  Self Yes  No   Sig: Take 100 mg by mouth daily   torsemide (DEMADEX) 20 mg tablet   No No   Sig: Take 1 tablet (20 mg total) by mouth daily   vitamin E 100 UNIT capsule  Self Yes No   Sig: Take 100 Units by mouth daily        Facility-Administered Medications: None     Discharge Medication List as of 1/17/2025  8:58 PM        CONTINUE these medications which have NOT CHANGED    Details   aspirin 325 mg tablet Take 325 mg by mouth daily, Historical Med      Biotin 5 MG TABS Take by mouth daily, Historical Med      carvedilol (COREG) 12.5 mg tablet Take 1 tablet (12.5 mg total) by mouth 2 (two) times a day, Starting Mon 1/13/2025, No Print      cholecalciferol (VITAMIN D3) 1,000 units tablet Take 1,000 Units by mouth 2 (two) times a day, Historical Med      cloNIDine (CATAPRES-TTS-1) 0.1 mg/24 hr Place 1 patch (0.1 mg total) on the skin over 7 days once a week, Starting Mon 1/13/2025, No Print      co-enzyme Q-10 30 MG capsule Take 100 mg by mouth daily , Historical Med      ezetimibe (ZETIA) 10 mg tablet Take 1 tablet (10 mg total) by mouth daily, Starting Thu 1/9/2025, Until Tue 7/8/2025, Normal      GLUCOSAMINE-CHONDROITIN PO Take 2 tablets by mouth daily, Historical Med      losartan (COZAAR) 25 mg tablet Take 1 tablet (25 mg total) by mouth daily, Starting Mon 1/13/2025, Normal      LUTEIN-ZEAXANTHIN-BILBERRY PO Take by mouth 2 (two) times a day, Historical Med      milk thistle 175 MG tablet Take 175 mg by mouth daily, Historical Med      Misc Natural Products (OSTEO BI-FLEX TRIPLE STRENGTH PO) Take 1 tablet by mouth 2 (two) times a day, Historical Med      multivitamin (THERAGRAN) TABS Take 1 tablet by mouth daily, Historical Med      Omega-3 Fatty Acids (FISH OIL) 1,000 mg Take 1,000 mg by mouth daily, Historical Med      pravastatin (PRAVACHOL) 40 mg tablet TAKE ONE TABLET BY MOUTH EVERY DAY, Normal      Taurine 500 MG CAPS Take by mouth daily, Historical Med      thiamine (VITAMIN B1) 100 mg tablet Take 100 mg by  mouth daily, Historical Med      torsemide (DEMADEX) 20 mg tablet Take 1 tablet (20 mg total) by mouth daily, Starting Mon 1/13/2025, Normal      Tradjenta 5 MG TABS Take 5 mg by mouth daily, Starting Thu 1/9/2025, Normal      TURMERIC PO Take by mouth  , Historical Med      vitamin E 100 UNIT capsule Take 100 Units by mouth daily  , Historical Med           No discharge procedures on file.  ED SEPSIS DOCUMENTATION   Time reflects when diagnosis was documented in both MDM as applicable and the Disposition within this note       Time User Action Codes Description Comment    1/17/2025  8:57 PM Elmira Hassan Add [K13.79] Oral bleeding                  Elmira Hassan MD  01/17/25 2100       Elmira Hsasan MD  01/17/25 9858

## 2025-01-20 ENCOUNTER — VBI (OUTPATIENT)
Dept: FAMILY MEDICINE CLINIC | Facility: CLINIC | Age: 85
End: 2025-01-20

## 2025-01-20 NOTE — TELEPHONE ENCOUNTER
01/20/25 10:01 AM    Patient contacted post ED visit, [facility] department spoke with patient/caregiver and outreach was successful.    Thank you.  Conchis Kiser MA  [facility]

## 2025-01-27 ENCOUNTER — OFFICE VISIT (OUTPATIENT)
Dept: OBGYN CLINIC | Facility: CLINIC | Age: 85
End: 2025-01-27
Payer: COMMERCIAL

## 2025-01-27 ENCOUNTER — APPOINTMENT (OUTPATIENT)
Dept: RADIOLOGY | Facility: AMBULARY SURGERY CENTER | Age: 85
End: 2025-01-27
Payer: COMMERCIAL

## 2025-01-27 VITALS — WEIGHT: 233 LBS | BODY MASS INDEX: 33.36 KG/M2 | HEIGHT: 70 IN

## 2025-01-27 DIAGNOSIS — M25.551 BILATERAL HIP PAIN: ICD-10-CM

## 2025-01-27 DIAGNOSIS — M25.552 BILATERAL HIP PAIN: ICD-10-CM

## 2025-01-27 DIAGNOSIS — G89.29 CHRONIC BILATERAL LOW BACK PAIN WITHOUT SCIATICA: Primary | ICD-10-CM

## 2025-01-27 DIAGNOSIS — M54.50 CHRONIC BILATERAL LOW BACK PAIN WITHOUT SCIATICA: Primary | ICD-10-CM

## 2025-01-27 PROCEDURE — 99204 OFFICE O/P NEW MOD 45 MIN: CPT | Performed by: PHYSICAL MEDICINE & REHABILITATION

## 2025-01-27 PROCEDURE — 73521 X-RAY EXAM HIPS BI 2 VIEWS: CPT

## 2025-01-27 NOTE — PROGRESS NOTES
1. Chronic bilateral low back pain without sciatica    2. Bilateral hip pain      Orders Placed This Encounter   Procedures    XR hip/pelv 2-3 vws left if performed    Ambulatory referral to Physical Therapy     No orders of the defined types were placed in this encounter.      Impression:  Bilateral lateral hip pain likely secondary to axial pain from sacroiliac joint dysfunction and lumbar degenerative disc disorder/facet hypertrophy.  Patient is likely having referred pain into his bilateral hip area from his spine.  No hip IA signs on physical examination. There are no concerning neurological deficits.    We discussed different treatment options and decided to proceed with formal physical therapy.  He can continue with Tylenol and topical patches.  I will see him back in 6 weeks to reassess.  If still symptomatic, we will obtain lumbar spine x-rays and consider advanced imaging.    Imaging Studies (I personally reviewed images in PACS and report):  Bilateral hip x-rays most recent to this encounter reviewed.  These images show mild hip osteoarthritis as evidenced by acetabular osteophytes.    Return in about 6 weeks (around 3/10/2025).    Patient is in agreement with the above plan.    HPI:  Jeremie Adams is a 84 y.o. male  who presents for evaluation of   Chief Complaint   Patient presents with    Right Hip - Pain    Left Hip - Pain       Onset/Mechanism: Chronic pain for years without a recent injury.  Location: Lateral hip area.  Radiation: Stays in the same spot. Can go into the back.  Provocative: Walking.  Severity: Painful.  Associated Symptoms: Denies numbness/tingling in the legs.  Treatment so far: No recent treatment for this.    Following history reviewed and updated:  Past Medical History:   Diagnosis Date    BPH (benign prostatic hyperplasia)     Diabetes mellitus (HCC)     Diverticulosis     History of cardiac cath 2005    Hyperglycemia     Hyperlipidemia     Hypertension     Kidney stone      "Macular degeneration     RIGHT    Obesity     LEONORA on CPAP     SBO (small bowel obstruction) (HCC)     Swollen ankles     LE EDEMA/VI     Past Surgical History:   Procedure Laterality Date    COLONOSCOPY  2007    HERNIA REPAIR  2005    pt unsure of exact date    RETINAL DETACHMENT SURGERY      TONSILLECTOMY       Social History   Social History     Substance and Sexual Activity   Alcohol Use Never     Social History     Substance and Sexual Activity   Drug Use Never     Social History     Tobacco Use   Smoking Status Never   Smokeless Tobacco Never     Family History   Problem Relation Age of Onset    COPD Mother     Emphysema Mother     Lung cancer Father      No Known Allergies     Constitutional:  Ht 5' 10\" (1.778 m)   Wt 106 kg (233 lb)   BMI 33.43 kg/m²    General: NAD.  Eyes: Anicteric sclerae.  Neck: Supple.  Lungs: Unlabored breathing.  Cardiovascular: No lower extremity edema.  Skin: Intact without erythema.  Neurologic: Sensation intact to light touch.  Psychiatric: Mood and affect are appropriate.    Right Hip Exam     Comments:  Normal Stinchfield, log roll and FADDIR.      Left Hip Exam     Comments:  Normal Stinchfield, log roll and FADDIR.      Back Exam     Tenderness   The patient is experiencing tenderness in the lumbar and sacroiliac.    Muscle Strength   The patient has normal back strength.    Other   Sensation: normal  Gait: normal   Erythema: no back redness  Scars: absent             Procedures              "

## 2025-02-04 ENCOUNTER — APPOINTMENT (OUTPATIENT)
Dept: LAB | Facility: CLINIC | Age: 85
End: 2025-02-04
Payer: COMMERCIAL

## 2025-02-04 DIAGNOSIS — N18.32 STAGE 3B CHRONIC KIDNEY DISEASE (HCC): ICD-10-CM

## 2025-02-04 LAB
ANION GAP SERPL CALCULATED.3IONS-SCNC: 11 MMOL/L (ref 4–13)
BUN SERPL-MCNC: 30 MG/DL (ref 5–25)
CALCIUM SERPL-MCNC: 9.6 MG/DL (ref 8.4–10.2)
CHLORIDE SERPL-SCNC: 104 MMOL/L (ref 96–108)
CO2 SERPL-SCNC: 30 MMOL/L (ref 21–32)
CREAT SERPL-MCNC: 2.22 MG/DL (ref 0.6–1.3)
GFR SERPL CREATININE-BSD FRML MDRD: 26 ML/MIN/1.73SQ M
GLUCOSE P FAST SERPL-MCNC: 136 MG/DL (ref 65–99)
POTASSIUM SERPL-SCNC: 3.6 MMOL/L (ref 3.5–5.3)
SODIUM SERPL-SCNC: 145 MMOL/L (ref 135–147)

## 2025-02-04 PROCEDURE — 36415 COLL VENOUS BLD VENIPUNCTURE: CPT

## 2025-02-04 PROCEDURE — 80048 BASIC METABOLIC PNL TOTAL CA: CPT

## 2025-02-05 ENCOUNTER — TELEPHONE (OUTPATIENT)
Dept: NEPHROLOGY | Facility: CLINIC | Age: 85
End: 2025-02-05

## 2025-02-05 DIAGNOSIS — I12.9 BENIGN HYPERTENSION WITH CHRONIC KIDNEY DISEASE: ICD-10-CM

## 2025-02-05 NOTE — TELEPHONE ENCOUNTER
Pt advises with D/C Spironolactone and increasing Torsemide to 20 mg QD, he has had no change in his edema.  He states his swelling has not improved at all and is asking for further recommendations.      ----- Message from Jamie Barrera MD sent at 2/5/2025 11:36 AM EST -----  Please inform patient that most recent labs (2/4/25) show that kidney function is better and SCr is down to 2.22.

## 2025-02-07 RX ORDER — TORSEMIDE 20 MG/1
40 TABLET ORAL DAILY
Start: 2025-02-07

## 2025-02-08 NOTE — TELEPHONE ENCOUNTER
Spoke to Lance over the phone.   No improvement in edema despite increase in Torsemide to 20 mg OD.   Advised to increase Torsemide to 40 mg OD.   Check BMP in 2 weeks.

## 2025-03-12 DIAGNOSIS — E78.2 MIXED HYPERLIPIDEMIA: ICD-10-CM

## 2025-03-12 RX ORDER — PRAVASTATIN SODIUM 40 MG
40 TABLET ORAL DAILY
Qty: 90 TABLET | Refills: 1 | Status: SHIPPED | OUTPATIENT
Start: 2025-03-12

## 2025-04-09 ENCOUNTER — APPOINTMENT (OUTPATIENT)
Dept: LAB | Facility: CLINIC | Age: 85
End: 2025-04-09
Payer: COMMERCIAL

## 2025-04-09 DIAGNOSIS — I12.9 BENIGN HYPERTENSION WITH CHRONIC KIDNEY DISEASE: ICD-10-CM

## 2025-04-09 DIAGNOSIS — N18.32 STAGE 3B CHRONIC KIDNEY DISEASE (HCC): ICD-10-CM

## 2025-04-09 LAB
25(OH)D3 SERPL-MCNC: 47.2 NG/ML (ref 30–100)
ALBUMIN SERPL BCG-MCNC: 4.2 G/DL (ref 3.5–5)
ANION GAP SERPL CALCULATED.3IONS-SCNC: 13 MMOL/L (ref 4–13)
BUN SERPL-MCNC: 41 MG/DL (ref 5–25)
CALCIUM SERPL-MCNC: 10.3 MG/DL (ref 8.4–10.2)
CHLORIDE SERPL-SCNC: 102 MMOL/L (ref 96–108)
CO2 SERPL-SCNC: 26 MMOL/L (ref 21–32)
CREAT SERPL-MCNC: 2.19 MG/DL (ref 0.6–1.3)
CREAT UR-MCNC: 58.3 MG/DL
ERYTHROCYTE [DISTWIDTH] IN BLOOD BY AUTOMATED COUNT: 13.5 % (ref 11.6–15.1)
GFR SERPL CREATININE-BSD FRML MDRD: 26 ML/MIN/1.73SQ M
GLUCOSE P FAST SERPL-MCNC: 165 MG/DL (ref 65–99)
HCT VFR BLD AUTO: 38.9 % (ref 36.5–49.3)
HGB BLD-MCNC: 12.4 G/DL (ref 12–17)
MAGNESIUM SERPL-MCNC: 2.1 MG/DL (ref 1.9–2.7)
MCH RBC QN AUTO: 29.8 PG (ref 26.8–34.3)
MCHC RBC AUTO-ENTMCNC: 31.9 G/DL (ref 31.4–37.4)
MCV RBC AUTO: 94 FL (ref 82–98)
MICROALBUMIN UR-MCNC: 274.8 MG/L
MICROALBUMIN/CREAT 24H UR: 471 MG/G CREATININE (ref 0–30)
PHOSPHATE SERPL-MCNC: 3.3 MG/DL (ref 2.3–4.1)
PLATELET # BLD AUTO: 148 THOUSANDS/UL (ref 149–390)
PMV BLD AUTO: 10 FL (ref 8.9–12.7)
POTASSIUM SERPL-SCNC: 3.6 MMOL/L (ref 3.5–5.3)
PTH-INTACT SERPL-MCNC: 8 PG/ML (ref 12–88)
RBC # BLD AUTO: 4.16 MILLION/UL (ref 3.88–5.62)
SODIUM SERPL-SCNC: 141 MMOL/L (ref 135–147)
WBC # BLD AUTO: 7.19 THOUSAND/UL (ref 4.31–10.16)

## 2025-04-09 PROCEDURE — 82043 UR ALBUMIN QUANTITATIVE: CPT

## 2025-04-09 PROCEDURE — 83970 ASSAY OF PARATHORMONE: CPT

## 2025-04-09 PROCEDURE — 82306 VITAMIN D 25 HYDROXY: CPT

## 2025-04-09 PROCEDURE — 36415 COLL VENOUS BLD VENIPUNCTURE: CPT

## 2025-04-09 PROCEDURE — 80069 RENAL FUNCTION PANEL: CPT

## 2025-04-09 PROCEDURE — 83735 ASSAY OF MAGNESIUM: CPT

## 2025-04-09 PROCEDURE — 85027 COMPLETE CBC AUTOMATED: CPT

## 2025-04-09 PROCEDURE — 82570 ASSAY OF URINE CREATININE: CPT

## 2025-04-11 ENCOUNTER — OFFICE VISIT (OUTPATIENT)
Dept: NEPHROLOGY | Facility: CLINIC | Age: 85
End: 2025-04-11

## 2025-04-11 VITALS
HEIGHT: 70 IN | DIASTOLIC BLOOD PRESSURE: 80 MMHG | SYSTOLIC BLOOD PRESSURE: 144 MMHG | HEART RATE: 73 BPM | OXYGEN SATURATION: 94 % | BODY MASS INDEX: 33.79 KG/M2 | WEIGHT: 236 LBS

## 2025-04-11 DIAGNOSIS — I35.0 NONRHEUMATIC AORTIC VALVE STENOSIS: ICD-10-CM

## 2025-04-11 DIAGNOSIS — M89.9 CHRONIC KIDNEY DISEASE-MINERAL AND BONE DISORDER: ICD-10-CM

## 2025-04-11 DIAGNOSIS — I12.9 TYPE 2 DM WITH CKD STAGE 3 AND HYPERTENSION (HCC): Primary | ICD-10-CM

## 2025-04-11 DIAGNOSIS — I12.9 BENIGN HYPERTENSION WITH CHRONIC KIDNEY DISEASE: ICD-10-CM

## 2025-04-11 DIAGNOSIS — N18.9 CHRONIC KIDNEY DISEASE-MINERAL AND BONE DISORDER: ICD-10-CM

## 2025-04-11 DIAGNOSIS — E11.22 TYPE 2 DM WITH CKD STAGE 3 AND HYPERTENSION (HCC): Primary | ICD-10-CM

## 2025-04-11 DIAGNOSIS — R60.0 LOWER EXTREMITY EDEMA: ICD-10-CM

## 2025-04-11 DIAGNOSIS — N18.30 TYPE 2 DM WITH CKD STAGE 3 AND HYPERTENSION (HCC): Primary | ICD-10-CM

## 2025-04-11 DIAGNOSIS — E83.9 CHRONIC KIDNEY DISEASE-MINERAL AND BONE DISORDER: ICD-10-CM

## 2025-04-11 DIAGNOSIS — N18.32 STAGE 3B CHRONIC KIDNEY DISEASE (HCC): ICD-10-CM

## 2025-04-11 DIAGNOSIS — R80.9 NON-NEPHROTIC RANGE PROTEINURIA: ICD-10-CM

## 2025-04-11 RX ORDER — TORSEMIDE 20 MG/1
TABLET ORAL
Qty: 270 TABLET | Refills: 1 | Status: SHIPPED | OUTPATIENT
Start: 2025-04-11

## 2025-04-11 NOTE — ASSESSMENT & PLAN NOTE
PTH and Vitamin D at goal.   Ca high - advised to cut back on whole mild.   Mg and phos are at goal.   Continue with current medications (see med list below).

## 2025-04-11 NOTE — PROGRESS NOTES
NEPHROLOGY OFFICE PROGRESS NOTE   Jeremie Adams 85 y.o. male MRN: [id number]  DATE: 04/11/25  Reason for visit: Continued evaluation and management of CKD.   Assessment & Plan  Stage 3b chronic kidney disease (HCC)  Baseline creatinine is around 1.9 to 2.3 over the past year.   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   Stable renal function - SCr 2.19.   Treatment: Good BP and glycemic control  Completed CKD education.    Benign hypertension with chronic kidney disease  BP is above goal (<130/80)  Currently appears hypervolemic.   Increase Torsemide to 40 mg AM and 20 mg in PM.     Nonrheumatic aortic valve stenosis  Check echocardiogram given worsening diuretic requirements.     Lower extremity edema  Increase Torsemide to 40 mg in AM and 20 mg in PM as above.     Non-nephrotic range proteinuria  Urine ACR is at goal (<1000 mg/g).   Continue with current medications (see med list below).   No longer on Spironolactone.     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  Not on an SGLT2i in the near future.     Chronic kidney disease-mineral and bone disorder  PTH and Vitamin D at goal.   Ca high - advised to cut back on whole mild.   Mg and phos are at goal.   Continue with current medications (see med list below).       Patient Instructions   You have chronic kidney disease (CKD) and your kidney function is stable.   Please increase Torsemide to 40 mg in AM and 20 mg in PM.   Check a venous duplex of the legs to evaluate for clots.   Check an echocardiogram.   Check CMP 2 weeks after increase in Torsemide.   Follow up in 4 months - please obtain blood work 1-2 weeks prior to the appointment.     Please check your BP twice daily for 1 week and bring a log with your next visit.   Please avoid taking NSAIDs (Ibuprofen, Motrin, Aleve, Advil, Naproxen, Celebrex, etc.)  Make sure you are eating healthy and have adequate exercise.     SUBJECTIVE / INTERVAL HISTORY:  Lance was last  "seen in the office in Jan 2025.   During last visit, we decreased Losartan to 25 mg OD and stopped Spironolactone. We increased Torsemide during that time.   Since then, Torsemide has gone up to 40 mg daily.   Since his last visit, there have been no unplanned hospitalizations.  He was seen in the ER on 1/17/2025 due to bleeding after tooth extraction.  Home BP 120s to 140s/80s.   Weight has been the same - no gain or loss.   He still has edema.   No CP or SOB.   Some orthopnea.     BP meds/diuretics:  Torsemide 20 mg BID.   Clonidine 0.1 mg patch  Carvedilol 12.5 mg BID.   Losartan 25 mg OD.     Off Spironolactone since Jan 2025.     Other renal pertinent meds:  Vitamin D 2000 units.     BP cuff calibration 1/13/25  Home BP cuff (wrist) 149/80  Office BP cuff 160/84     PMH/PSH: HTN, DM, HLP, CAD, AS, LEONORA on CPAP, leg edema, BPH, DJD, SBO, macular degeneration, tonsillectomy, R inguinal hernia, retinal surgery.      Previous work up:   November 1, 2019:  Aldosterone 6, plasma renin activity 0.22, UPC ratio 0.85, metanephrines 34, normetanephrines 83, TSH 1.53     10/25/19 Renal US: R 12.9 cm, L 14.3 cm, bilateral cortical cysts.  No hydronephrosis.    ALLERGIES: No Known Allergies    REVIEW OF SYSTEMS:  Review of Systems   Constitutional:  Negative for chills and fever.   Respiratory:  Positive for shortness of breath (on exertion). Negative for cough.    Cardiovascular:  Positive for leg swelling. Negative for chest pain.   Gastrointestinal:  Negative for diarrhea, nausea and vomiting.   Genitourinary:  Negative for hematuria.   Neurological:  Negative for dizziness and light-headedness.     OBJECTIVE:  /80 (BP Location: Left arm, Patient Position: Sitting, Cuff Size: Large)   Pulse 73   Ht 5' 10\" (1.778 m)   Wt 107 kg (236 lb)   SpO2 94%   BMI 33.86 kg/m²   Current Weight: Weight - Scale: 107 kg (236 lb) Body mass index is 33.86 kg/m².  Physical Exam  Constitutional:       General: He is not in acute " distress.     Appearance: Normal appearance. He is obese. He is not ill-appearing or toxic-appearing.   HENT:      Head: Normocephalic and atraumatic.   Eyes:      Conjunctiva/sclera: Conjunctivae normal.   Cardiovascular:      Rate and Rhythm: Normal rate and regular rhythm.      Heart sounds: Murmur heard.   Pulmonary:      Effort: Pulmonary effort is normal.      Breath sounds: Normal breath sounds.   Abdominal:      General: Bowel sounds are normal.      Palpations: Abdomen is soft.   Musculoskeletal:      Comments: Bilateral LE edema, R > L.    Skin:     General: Skin is warm and dry.   Neurological:      Mental Status: He is alert. Mental status is at baseline.   Psychiatric:         Behavior: Behavior normal.         Judgment: Judgment normal.       Medications:  Current Outpatient Medications:     aspirin 325 mg tablet, Take 325 mg by mouth daily, Disp: , Rfl:     Biotin 5 MG TABS, Take by mouth daily, Disp: , Rfl:     carvedilol (COREG) 12.5 mg tablet, Take 1 tablet (12.5 mg total) by mouth 2 (two) times a day, Disp: , Rfl:     cholecalciferol (VITAMIN D3) 1,000 units tablet, Take 1,000 Units by mouth 2 (two) times a day, Disp: , Rfl:     cloNIDine (CATAPRES-TTS-1) 0.1 mg/24 hr, Place 1 patch (0.1 mg total) on the skin over 7 days once a week, Disp: , Rfl:     co-enzyme Q-10 30 MG capsule, Take 100 mg by mouth daily , Disp: , Rfl:     ezetimibe (ZETIA) 10 mg tablet, Take 1 tablet (10 mg total) by mouth daily, Disp: 30 tablet, Rfl: 5    GLUCOSAMINE-CHONDROITIN PO, Take 2 tablets by mouth daily, Disp: , Rfl:     losartan (COZAAR) 25 mg tablet, Take 1 tablet (25 mg total) by mouth daily, Disp: 30 tablet, Rfl: 1    LUTEIN-ZEAXANTHIN-BILBERRY PO, Take by mouth 2 (two) times a day, Disp: , Rfl:     milk thistle 175 MG tablet, Take 175 mg by mouth daily, Disp: , Rfl:     Misc Natural Products (OSTEO BI-FLEX TRIPLE STRENGTH PO), Take 1 tablet by mouth 2 (two) times a day, Disp: , Rfl:     multivitamin (THERAGRAN)  TABS, Take 1 tablet by mouth daily, Disp: , Rfl:     Omega-3 Fatty Acids (FISH OIL) 1,000 mg, Take 1,000 mg by mouth daily, Disp: , Rfl:     pravastatin (PRAVACHOL) 40 mg tablet, TAKE ONE TABLET BY MOUTH EVERY DAY, Disp: 90 tablet, Rfl: 1    Taurine 500 MG CAPS, Take by mouth daily, Disp: , Rfl:     thiamine (VITAMIN B1) 100 mg tablet, Take 100 mg by mouth daily, Disp: , Rfl:     torsemide (DEMADEX) 20 mg tablet, Take 2 tablets (40 mg total) by mouth daily, Disp: , Rfl:     Tradjenta 5 MG TABS, Take 5 mg by mouth daily, Disp: 30 tablet, Rfl: 5    TURMERIC PO, Take by mouth  , Disp: , Rfl:     vitamin E 100 UNIT capsule, Take 100 Units by mouth daily  , Disp: , Rfl:     Laboratory Results:  Results for orders placed or performed in visit on 04/09/25   CBC   Result Value Ref Range    WBC 7.19 4.31 - 10.16 Thousand/uL    RBC 4.16 3.88 - 5.62 Million/uL    Hemoglobin 12.4 12.0 - 17.0 g/dL    Hematocrit 38.9 36.5 - 49.3 %    MCV 94 82 - 98 fL    MCH 29.8 26.8 - 34.3 pg    MCHC 31.9 31.4 - 37.4 g/dL    RDW 13.5 11.6 - 15.1 %    Platelets 148 (L) 149 - 390 Thousands/uL    MPV 10.0 8.9 - 12.7 fL   Magnesium   Result Value Ref Range    Magnesium 2.1 1.9 - 2.7 mg/dL   Renal function panel   Result Value Ref Range    Albumin 4.2 3.5 - 5.0 g/dL    Calcium 10.3 (H) 8.4 - 10.2 mg/dL    Phosphorus 3.3 2.3 - 4.1 mg/dL    BUN 41 (H) 5 - 25 mg/dL    Creatinine 2.19 (H) 0.60 - 1.30 mg/dL    Sodium 141 135 - 147 mmol/L    Potassium 3.6 3.5 - 5.3 mmol/L    Chloride 102 96 - 108 mmol/L    CO2 26 21 - 32 mmol/L    ANION GAP 13 4 - 13 mmol/L    eGFR 26 ml/min/1.73sq m    Glucose, Fasting 165 (H) 65 - 99 mg/dL   PTH, intact   Result Value Ref Range    PTH 8.0 (L) 12.0 - 88.0 pg/mL   Vitamin D 25 hydroxy   Result Value Ref Range    Vit D, 25-Hydroxy 47.2 30.0 - 100.0 ng/mL   Albumin / creatinine urine ratio   Result Value Ref Range    Creatinine, Ur 58.3 Reference range not established. mg/dL    Albumin,U,Random 274.8 (H) <20.0 mg/L     Albumin Creat Ratio 471 (H) 0 - 30 mg/g creatinine

## 2025-04-11 NOTE — PATIENT INSTRUCTIONS
You have chronic kidney disease (CKD) and your kidney function is stable.   Please increase Torsemide to 40 mg in AM and 20 mg in PM.   Check a venous duplex of the legs to evaluate for clots.   Check an echocardiogram.   Check CMP 2 weeks after increase in Torsemide.   Follow up in 4 months - please obtain blood work 1-2 weeks prior to the appointment.     Please check your BP twice daily for 1 week and bring a log with your next visit.   Please avoid taking NSAIDs (Ibuprofen, Motrin, Aleve, Advil, Naproxen, Celebrex, etc.)  Make sure you are eating healthy and have adequate exercise.

## 2025-04-11 NOTE — ASSESSMENT & PLAN NOTE
BP is above goal (<130/80)  Currently appears hypervolemic.   Increase Torsemide to 40 mg AM and 20 mg in PM.

## 2025-04-11 NOTE — ASSESSMENT & PLAN NOTE
Baseline creatinine is around 1.9 to 2.3 over the past year.   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   Stable renal function - SCr 2.19.   Treatment: Good BP and glycemic control  Completed CKD education.

## 2025-05-05 NOTE — LETTER
CBC/Blood cultures already ordered for 8/28   Have you been to the ER, urgent care clinic since your last visit?  Hospitalized since your last visit?   NO    Have you seen or consulted any other health care providers outside our system since your last visit?   NO      “Have you had a colorectal cancer screening such as a colonoscopy/FIT/Cologuard?    NO    No colonoscopy on file  No cologuard on file  No FIT/FOBT on file   No flexible sigmoidoscopy on file     “Have you had a diabetic eye exam?”    NO     No diabetic eye exam on file            Chief Complaint   Patient presents with    Medicare AWV          October 23, 2019     Sherman Aguayo MD  [address] [place] [address] 1  [facility] 105    Patient: Wale Rojas   YOB: 1940   Date of Visit: 10/23/2019       Dear Dr Akira Rodriguez: Thank you for referring Olivia Yoo to me for evaluation  Below are my notes for this consultation  If you have questions, please do not hesitate to call me  I look forward to following your patient along with you  Sincerely,        Kirsten Pate MD        CC: No Recipients  Kirsten Pate MD  10/23/2019  4:28 PM  Sign at close encounter  [address] [facility] 70 y o  male MRN: [id number]  Date: 10/23/19  Reason for consultation: Initial evaluation of albuminuria    ASSESSMENT and PLAN:  1  Chronic kidney disease, stage III  · Baseline serum creatinine appears to be around 1 3-1 4  · The etiology of the CKD is not entirely clear  Given the proteinuria, it could be due to diabetic nephropathy but he reports only having had diabetes for about 5 years which would make diabetic nephropathy less likely to be the cause of his CKD  He does have long standing hypertension which could lead to hypertensive nephrosclerosis but this would not explain his proteinuria  · For workup, we will check a urinalysis and a urine protein to creatinine ratio  Additionally, will check a renal ultrasound  · We will plan to order a serologic work up depending on the degree of proteinuria  · Treatment/Management consists of controlling modifiable risk factors (good blood pressure, glycemic and lipid control) and avoidance of nephrotoxic medications in order to slow down progression of renal disease  · Since he is already on Losartan, the next step in terms of management if he has uncontrolled proteinuria would be to add Spironolactone  2  Proteinuria:  · Check urinalysis and urine protein to creatinine ratio  · Will plan for serologic work up if proteinuria is > 1 gm     · As above, Patient is having an MRI done this evening and wondering if Dr. Zamarripa can prescribe valium for her for the procedure. Please advise.   Phone call to patient and aware of recommendations and Valium called in.  Please order blood work   Please call out Valium 5 mg as directed.    PDMP reviewed; therapy appropriate, no aberrant behavior identified, prescription given.    Please let patient know I talked with her neurologist Dr. Mercedes on August 24. 2017.  Patient's white count is slightly elevated. Recommend repeat CBC August 28, 2017  Recommend 2 blood cultures to be drawn on August 28, 2017   See pdmp   consideration for adding Spironolactone if proteinuria uncontrolled  3  Hypertension:  · BP is above goal with Clonidine 0 3 mg patch, Carvedilol 12 5 mg BID, Losartan HCT 50/12 5 mg BID  · Will evaluate for secondary causes - check aldosterone, renin, metanephrines, TSH  4  Mineral and bone disease:  · Check PTH and Vitamin D levels  Patient Instructions   Check blood and urine testing now  Check kidney ultrasound  Once your initial blood and urine test is back, I will call over the phone to discuss  Timing of the repeat labs will be depending on whether we end up starting a new medicine (Spironolactone)  Follow up in 3 months  Check BP x 1 week straight prior to next follow up  HISTORY OF PRESENT ILLNESS:  Requesting Physician: Durga Fritz MD    Daren Zambrano is a 70 y o  male who was referred for initial evaluation of albuminuria  Junior Gutierrez has a history of hypertension, diabetes mellitus, hyperlipidemia, and chronic kidney disease  He was 1st told about proteinuria about 6 months ago and was actually referred to Nephrology during that time but did not make an appointment  Based on my review of the record, I note that his serum creatinine has been in the range of around 1 3-1 4 basing on labs between August 2018 and October 2019  His most recent creatinine that is available to me is 1 30 from 10/22/19  He was found to have albuminuria months ago with an albumin to creatinine ratio of 811 4 mg/g  Due to the albuminuria, a renal consultation was requested  He has not had any repeat urine testing for protein excretion measurement  He is unaware of a history of frequent urinary tract infections  He uses NSAIDs occasionally - once every few weeks  PAST MEDICAL HISTORY:  1  HTN: Diagnosed about 40 years ago  No admissions for HTN urgency  Highest recalled SBP is > 200 mm Hg  BP over the past 6 months has been in range of 120s to 150s/70s   For the most part, it is in the 130s/70s  2  DM: Diagnosed about 5 years ago? HbA1c has been < 7 0    3  HLP: on statins  4  CAD: Cardiac catheterization prior to 2000 - some non obstructive disease  5  LEONORA: on CPAP  6  Bilateral leg edema: Has had this for about 10 years  Previous doppler was negative for DVT  7  BPH: not on medications now but was on Flomax before  8  DJD  9  SBO: medically treated in the past    10  Macular degeneration  11  Hay fever: as a child  No known history of CHF, CVA, PAD, COPD, asthma, thromboembolism, liver disease, GERD, malignancy  PAST SURGICAL HISTORY:  1  Tonsillectomy 1952    2  R inguinal hernia repair  3  Retinal surgery x 2  ALLERGIES:  No Known Allergies    SOCIAL HISTORY:  · Non smoker  · Social alcohol use - once a week  · No IVDA  FAMILY HISTORY:  · Negative for CKD or ESRD       MEDICATIONS:    Current Outpatient Medications:     aspirin 325 mg tablet, Take 325 mg by mouth daily, Disp: , Rfl:     carvedilol (COREG) 12 5 mg tablet, Take 12 5 mg by mouth 2 (two) times a day, Disp: , Rfl: 3    Choline Fenofibrate (FENOFIBRIC ACID) 135 MG CPDR, Take 1 capsule by mouth daily, Disp: , Rfl: 6    cloNIDine (CATAPRES-TTS-3) 0 3 mg/24 hr, Place 1 patch on the skin once a week, Disp: , Rfl: 6    co-enzyme Q-10 30 MG capsule, Take 30 mg by mouth daily, Disp: , Rfl:     Glucosamine-Chondroitin 2152-0264 MG/30ML LIQD, Take 1 tablet by mouth daily, Disp: , Rfl:     KLOR-CON M10 10 MEQ tablet, Take 10 mEq by mouth daily, Disp: , Rfl: 10    losartan-hydrochlorothiazide (HYZAAR) 50-12 5 mg per tablet, Take 2 tablets by mouth daily, Disp: , Rfl: 0    metFORMIN (GLUCOPHAGE) 500 mg tablet, Take 500 mg by mouth daily, Disp: , Rfl: 5    milk thistle 175 MG tablet, Take 175 mg by mouth daily, Disp: , Rfl:     Misc Natural Products (OSTEO BI-FLEX TRIPLE STRENGTH PO), Take by mouth, Disp: , Rfl:     multivitamin (THERAGRAN) TABS, Take 1 tablet by mouth daily, Disp: , Rfl:     Omega-3 Fatty Acids (FISH OIL) 1,000 mg, Take 1,000 mg by mouth daily, Disp: , Rfl:     pravastatin (PRAVACHOL) 40 mg tablet, Take 40 mg by mouth daily, Disp: , Rfl: 3    thiamine (VITAMIN B1) 100 mg tablet, Take 100 mg by mouth daily, Disp: , Rfl:     TRADJENTA 5 MG TABS, Take 5 mg by mouth daily, Disp: , Rfl: 9    TURMERIC PO, Take by mouth, Disp: , Rfl:     VITAMIN D, CHOLECALCIFEROL, PO, Take by mouth, Disp: , Rfl:     vitamin E 100 UNIT capsule, Take 100 Units by mouth daily, Disp: , Rfl:     REVIEW OF SYSTEMS:  Review of Systems   Constitutional: Positive for fatigue  Negative for appetite change, chills and fever  HENT: Negative for postnasal drip, rhinorrhea, sinus pressure and sinus pain  Eyes: Positive for visual disturbance  Respiratory: Negative for cough and shortness of breath  Cardiovascular: Positive for leg swelling  Negative for chest pain  Gastrointestinal: Negative for abdominal pain, diarrhea, nausea and vomiting  Endocrine: Negative for polyuria  Genitourinary: Negative for dysuria and hematuria  Musculoskeletal: Positive for arthralgias and back pain  Skin: Negative for rash  Allergic/Immunologic: Negative for immunocompromised state  Neurological: Negative for dizziness and light-headedness  Hematological: Does not bruise/bleed easily  Psychiatric/Behavioral: Negative for dysphoric mood  The patient is not nervous/anxious  All the systems were reviewed and were negative except as documented on the HPI  PHYSICAL EXAMINATION:  BP (!) 182/98   Ht 5' 10" (1 778 m)   Wt 113 kg (248 lb 9 6 oz)   BMI 35 67 kg/m²    Current Weight:   Body mass index is 35 67 kg/m²  General: conscious, coherent, cooperative, not in distress  Skin: warm, dry, good turgor  Eyes: pink conjunctivae, no scleral icterus  ENT: moist lips and mucous membranes  Neck: supple, no JVD  Chest/Lungs: clear breath sounds     CVS: distinct heart sounds, normal rate, regular rhythm, (+) systolic murmur in the base  Abdomen: soft, non-tender, non-distended, normoactive bowel sounds  Extremities: (+) edema of ankles and lower legs  : deferred   Neuro: awake, alert, oriented to time, place and person  Psych: appropriate affect  LABORATORY RESULTS:  March 16, 2019:  Sodium 145, potassium 3 8, chloride 107, carbon dioxide 32, BUN 23, creatinine 1 36, calcium 9 4, hemoglobin A1c 6 5  October 22, 2019:  Sodium 141, potassium 3 5, chloride 105, CO2 28, BUN 21, creatinine 1 30  October 24, 2018:  Creatinine 1 36  August 11, 2018:  Creatinine 1 37    Office UA and microscopy: Protein 3+, Blood negative, 0-2 RBC/hpf, 0-2 WBC/hpf       IMAGING:   none away. One of his sisters has passed away.    Social History     Socioeconomic History    Marital status: Single     Spouse name: Not on file    Number of children: Not on file    Years of education: Not on file    Highest education level: Not on file   Occupational History    Not on file   Tobacco Use    Smoking status: Never    Smokeless tobacco: Never   Substance and Sexual Activity    Alcohol use: Never    Drug use: Never    Sexual activity: Not Currently     Partners: Female   Other Topics Concern    Not on file   Social History Narrative    Lives alone     Social Drivers of Health     Financial Resource Strain: Low Risk  (2/14/2024)    Overall Financial Resource Strain (CARDIA)     Difficulty of Paying Living Expenses: Not hard at all   Food Insecurity: No Food Insecurity (2/5/2025)    Hunger Vital Sign     Worried About Running Out of Food in the Last Year: Never true     Ran Out of Food in the Last Year: Never true   Transportation Needs: No Transportation Needs (2/5/2025)    PRAPARE - Transportation     Lack of Transportation (Medical): No     Lack of Transportation (Non-Medical): No   Physical Activity: Insufficiently Active (5/5/2025)    Exercise Vital Sign     Days of Exercise per Week: 4 days     Minutes of Exercise per Session: 30 min   Stress: No Stress Concern Present (4/12/2023)    [employer] [place] [employer] - Occupational Stress Questionnaire     Feeling of Stress : Only a little   Social Connections: Not on file   Intimate Partner Violence: Not At Risk (4/12/2023)    Humiliation, Afraid, Rape, and Kick questionnaire     Fear of Current or Ex-Partner: No     Emotionally Abused: No     Physically Abused: No     Sexually Abused: No   Housing Stability: Low Risk  (2/5/2025)    Housing Stability Vital Sign     Unable to Pay for Housing in the Last Year: No     Number of Times Moved in the Last Year: 0     Homeless in the Last Year: No         Objective:     /78 (BP Site: Left Upper

## 2025-06-04 ENCOUNTER — APPOINTMENT (OUTPATIENT)
Dept: LAB | Facility: CLINIC | Age: 85
End: 2025-06-04
Payer: COMMERCIAL

## 2025-06-04 DIAGNOSIS — N18.32 STAGE 3B CHRONIC KIDNEY DISEASE (HCC): ICD-10-CM

## 2025-06-04 LAB
ALBUMIN SERPL BCG-MCNC: 4.2 G/DL (ref 3.5–5)
ALP SERPL-CCNC: 55 U/L (ref 34–104)
ALT SERPL W P-5'-P-CCNC: 18 U/L (ref 7–52)
ANION GAP SERPL CALCULATED.3IONS-SCNC: 13 MMOL/L (ref 4–13)
AST SERPL W P-5'-P-CCNC: 16 U/L (ref 13–39)
BILIRUB SERPL-MCNC: 0.78 MG/DL (ref 0.2–1)
BUN SERPL-MCNC: 57 MG/DL (ref 5–25)
CALCIUM SERPL-MCNC: 9.2 MG/DL (ref 8.4–10.2)
CHLORIDE SERPL-SCNC: 101 MMOL/L (ref 96–108)
CO2 SERPL-SCNC: 28 MMOL/L (ref 21–32)
CREAT SERPL-MCNC: 2.57 MG/DL (ref 0.6–1.3)
GFR SERPL CREATININE-BSD FRML MDRD: 22 ML/MIN/1.73SQ M
GLUCOSE P FAST SERPL-MCNC: 156 MG/DL (ref 65–99)
POTASSIUM SERPL-SCNC: 3.6 MMOL/L (ref 3.5–5.3)
PROT SERPL-MCNC: 7.1 G/DL (ref 6.4–8.4)
SODIUM SERPL-SCNC: 142 MMOL/L (ref 135–147)

## 2025-06-04 PROCEDURE — 80053 COMPREHEN METABOLIC PANEL: CPT

## 2025-06-04 PROCEDURE — 36415 COLL VENOUS BLD VENIPUNCTURE: CPT

## 2025-06-05 ENCOUNTER — OFFICE VISIT (OUTPATIENT)
Dept: ENDOCRINOLOGY | Facility: CLINIC | Age: 85
End: 2025-06-05
Payer: COMMERCIAL

## 2025-06-05 VITALS
BODY MASS INDEX: 33.5 KG/M2 | RESPIRATION RATE: 16 BRPM | OXYGEN SATURATION: 96 % | WEIGHT: 234 LBS | HEIGHT: 70 IN | DIASTOLIC BLOOD PRESSURE: 70 MMHG | HEART RATE: 65 BPM | TEMPERATURE: 98 F | SYSTOLIC BLOOD PRESSURE: 132 MMHG

## 2025-06-05 DIAGNOSIS — E83.9 CHRONIC KIDNEY DISEASE-MINERAL AND BONE DISORDER: ICD-10-CM

## 2025-06-05 DIAGNOSIS — E66.09 CLASS 1 OBESITY DUE TO EXCESS CALORIES WITH SERIOUS COMORBIDITY AND BODY MASS INDEX (BMI) OF 33.0 TO 33.9 IN ADULT: ICD-10-CM

## 2025-06-05 DIAGNOSIS — M89.9 CHRONIC KIDNEY DISEASE-MINERAL AND BONE DISORDER: ICD-10-CM

## 2025-06-05 DIAGNOSIS — E66.811 CLASS 1 OBESITY DUE TO EXCESS CALORIES WITH SERIOUS COMORBIDITY AND BODY MASS INDEX (BMI) OF 33.0 TO 33.9 IN ADULT: ICD-10-CM

## 2025-06-05 DIAGNOSIS — N18.9 CHRONIC KIDNEY DISEASE-MINERAL AND BONE DISORDER: ICD-10-CM

## 2025-06-05 DIAGNOSIS — E78.2 MIXED HYPERLIPIDEMIA: ICD-10-CM

## 2025-06-05 DIAGNOSIS — E11.65 TYPE 2 DIABETES MELLITUS WITH HYPERGLYCEMIA, WITHOUT LONG-TERM CURRENT USE OF INSULIN (HCC): Primary | ICD-10-CM

## 2025-06-05 LAB — SL AMB POCT HEMOGLOBIN AIC: 7.6 (ref ?–6.5)

## 2025-06-05 PROCEDURE — 83036 HEMOGLOBIN GLYCOSYLATED A1C: CPT | Performed by: INTERNAL MEDICINE

## 2025-06-05 PROCEDURE — G2211 COMPLEX E/M VISIT ADD ON: HCPCS | Performed by: INTERNAL MEDICINE

## 2025-06-05 PROCEDURE — 99214 OFFICE O/P EST MOD 30 MIN: CPT | Performed by: INTERNAL MEDICINE

## 2025-06-05 NOTE — PROGRESS NOTES
"    Follow-up Patient Progress Note      CC:Type 2 diabetes     History of Present Illness:   83 yr male with Type 2 diabetes since 2008, CKD4 eGFR 25, low ALP, HTN, HLD, obesity BMI 35 and vitamin D deficiency. Last visit was 7/5/24.     Since last visit, weight is up 4 lbs. He made significant changes to diet and lifestyle and feels well.  Some improved energy is reported.        CGM data review::  Device: Stephon pro         dates:  8/17/2023-8/31/2023   usage: 100%   Av glu: 123 mg/dL                   SD:  mg/dL            CV: 15.6%       GMI:   %  TIR: 99%                     TAR: 1%                      TBR: 0%     Glycemic patters: Excellent control with normal glycemia throughout.  Hypoglycemia: No     Current meds:  Tradjenta 5mg daily     Opthamology: yes. Macular degeneration  Podiatry: No  Vaccination: Yes   Dental:  Pancreatitis: No     Ace/ARB: losartan  Statin: pravastain  Thyroid issues: No     6/27/23 DXA: T Scores 1.5 LS, -0.7LTH, -1.4 LFN and -1.1 Lt forearm. 10 yr FRAX score 2.1% hip and 6.3% major osteoporotic fracture.       Physical Exam:  Body mass index is 33.58 kg/m².  /70 (BP Location: Left arm, Patient Position: Sitting)   Pulse 65   Temp 98 °F (36.7 °C) (Temporal)   Resp 16   Ht 5' 10\" (1.778 m)   Wt 106 kg (234 lb)   SpO2 96%   BMI 33.58 kg/m²    Vitals:    06/05/25 1307   Weight: 106 kg (234 lb)        Physical Exam  Constitutional:       General: He is not in acute distress.     Appearance: He is well-developed.   HENT:      Head: Normocephalic and atraumatic.      Nose: Nose normal.     Eyes:      Conjunctiva/sclera: Conjunctivae normal.     Pulmonary:      Effort: Pulmonary effort is normal.   Abdominal:      General: There is no distension.     Musculoskeletal:      Cervical back: Normal range of motion and neck supple.     Skin:     Findings: No rash.      Comments: No icterus     Neurological:      Mental Status: He is alert and oriented to person, place, and time. "         Labs:   Lab Results   Component Value Date    HGBA1C 7.0 (H) 01/09/2025       Lab Results   Component Value Date    YOD1OPYNXDKT 2.162 06/24/2024       Lab Results   Component Value Date    CREATININE 2.57 (H) 06/04/2025    CREATININE 2.19 (H) 04/09/2025    CREATININE 2.22 (H) 02/04/2025    BUN 57 (H) 06/04/2025    K 3.6 06/04/2025     06/04/2025    CO2 28 06/04/2025     eGFR   Date Value Ref Range Status   06/04/2025 22 ml/min/1.73sq m Final       Lab Results   Component Value Date    ALT 18 06/04/2025    AST 16 06/04/2025    ALKPHOS 55 06/04/2025       Lab Results   Component Value Date    CHOLESTEROL 150 06/24/2024    CHOLESTEROL 147 11/02/2023    CHOLESTEROL 179 10/25/2022     Lab Results   Component Value Date    HDL 47 06/24/2024    HDL 42 11/02/2023    HDL 46 10/25/2022     Lab Results   Component Value Date    TRIG 143 06/24/2024    TRIG 163 (H) 11/02/2023    TRIG 124 10/25/2022     Lab Results   Component Value Date    NONHDLC 105 11/02/2023    NONHDLC 133 10/25/2022    NONHDLC 118 07/16/2021         Assessment/Plan:    1. Type 2 diabetes mellitus with hyperglycemia, without long-term current use of insulin (HCC)  Assessment & Plan:  He is is reasonably controlled with POC A1c 7.6% today.    Today we reviewed options and we agreed to continue current therapy Tradjenta.    Follow up in 6 months.    Lab Results   Component Value Date    HGBA1C 7.6 (A) 06/05/2025     Orders:  -     Hemoglobin A1C; Future  -     Fructosamine; Future  -     POCT hemoglobin A1c  2. Class 1 obesity due to excess calories with serious comorbidity and body mass index (BMI) of 33.0 to 33.9 in adult  3. Mixed hyperlipidemia  4. Chronic kidney disease-mineral and bone disorder  Assessment & Plan:  Recent PTH was very low. This may be risk for adynamic bone disease. Defer to nephrology.    Lab Results   Component Value Date    EGFR 22 06/04/2025    EGFR 26 04/09/2025    EGFR 26 02/04/2025    CREATININE 2.57 (H) 06/04/2025     CREATININE 2.19 (H) 04/09/2025    CREATININE 2.22 (H) 02/04/2025           I have spent a total time of  minutes on 06/05/25 in caring for this patient including greater than 50% of this time was spent in counseling/coordination of care as listed above.       Discussed with the patient and all questioned fully answered. He will contact me with concerns.    Tony Bustos

## 2025-06-05 NOTE — ASSESSMENT & PLAN NOTE
Recent PTH was very low. This may be risk for adynamic bone disease. Defer to nephrology.    Lab Results   Component Value Date    EGFR 22 06/04/2025    EGFR 26 04/09/2025    EGFR 26 02/04/2025    CREATININE 2.57 (H) 06/04/2025    CREATININE 2.19 (H) 04/09/2025    CREATININE 2.22 (H) 02/04/2025

## 2025-06-05 NOTE — ASSESSMENT & PLAN NOTE
He is is reasonably controlled with POC A1c 7.6% today.    Today we reviewed options and we agreed to continue current therapy Tradjenta.    Follow up in 6 months.    Lab Results   Component Value Date    HGBA1C 7.6 (A) 06/05/2025

## 2025-06-07 ENCOUNTER — TELEPHONE (OUTPATIENT)
Dept: OTHER | Facility: HOSPITAL | Age: 85
End: 2025-06-07

## 2025-06-07 DIAGNOSIS — I12.9 BENIGN HYPERTENSION WITH CHRONIC KIDNEY DISEASE: ICD-10-CM

## 2025-06-07 RX ORDER — TORSEMIDE 20 MG/1
20 TABLET ORAL 2 TIMES DAILY
Start: 2025-06-07

## 2025-06-07 NOTE — TELEPHONE ENCOUNTER
I called the patient and we spoke over the phone.   Recent laboratory data were reviewed over the phone.     Lab Results   Component Value Date    SODIUM 142 06/04/2025    K 3.6 06/04/2025     06/04/2025    CO2 28 06/04/2025    BUN 57 (H) 06/04/2025    CREATININE 2.57 (H) 06/04/2025    GLUC 138 12/29/2023    CALCIUM 9.2 06/04/2025     SCr is worse and up to 2.57 since we increased Torsemide to 40 mg in AM and 20 mg in PM.   He reports no improvement in swelling with increase in Torsemide.   He has used leg ACE wraps which have helped the swelling.     Plan:  Decrease Torsemide to 20 mg BID since increase in diuretics did not help edema and the issue seems to be venous insufficiency.   Check BMP in 1 month.

## 2025-06-25 ENCOUNTER — RA CDI HCC (OUTPATIENT)
Dept: OTHER | Facility: HOSPITAL | Age: 85
End: 2025-06-25

## 2025-06-26 DIAGNOSIS — I12.9 BENIGN HYPERTENSION WITH CHRONIC KIDNEY DISEASE: ICD-10-CM

## 2025-06-26 RX ORDER — LOSARTAN POTASSIUM 25 MG/1
25 TABLET ORAL 2 TIMES DAILY
Qty: 180 TABLET | Refills: 1 | Status: SHIPPED | OUTPATIENT
Start: 2025-06-26

## 2025-06-30 ENCOUNTER — OFFICE VISIT (OUTPATIENT)
Dept: FAMILY MEDICINE CLINIC | Facility: CLINIC | Age: 85
End: 2025-06-30
Payer: COMMERCIAL

## 2025-06-30 VITALS
BODY MASS INDEX: 32.9 KG/M2 | RESPIRATION RATE: 20 BRPM | OXYGEN SATURATION: 96 % | HEART RATE: 71 BPM | SYSTOLIC BLOOD PRESSURE: 136 MMHG | TEMPERATURE: 97.9 F | HEIGHT: 70 IN | WEIGHT: 229.8 LBS | DIASTOLIC BLOOD PRESSURE: 70 MMHG

## 2025-06-30 DIAGNOSIS — E78.2 MIXED HYPERLIPIDEMIA: ICD-10-CM

## 2025-06-30 DIAGNOSIS — Z00.00 MEDICARE ANNUAL WELLNESS VISIT, SUBSEQUENT: Primary | ICD-10-CM

## 2025-06-30 PROCEDURE — G0439 PPPS, SUBSEQ VISIT: HCPCS | Performed by: NURSE PRACTITIONER

## 2025-06-30 NOTE — PROGRESS NOTES
Name: Jeremie Adams      : 1940      MRN: [id number]  Encounter Provider: JEOVANY Barth  Encounter Date: 2025   Encounter department: [facility] [place]  :  Assessment & Plan  Medicare annual wellness visit, subsequent         Mixed hyperlipidemia    Orders:  •  Lipid panel; Future       Preventive health issues were discussed with patient, and age appropriate screening tests were ordered as noted in patient's After Visit Summary. Personalized health advice and appropriate referrals for health education or preventive services given if needed, as noted in patient's After Visit Summary.    History of Present Illness     HPI   Patient Care Team:  JEOVANY Barth as PCP - General (Family Medicine)  Jamie Barrera MD (Nephrology)    Review of Systems  Medical History Reviewed by provider this encounter:  Tobacco  Allergies  Meds  Problems  Med Hx  Surg Hx  Fam Hx       Annual Wellness Visit Questionnaire   Jeremie is here for his Subsequent Wellness visit. Last Medicare Wellness visit information reviewed, patient interviewed and updates made to the record today.      Health Risk Assessment:   Patient rates overall health as very good. Patient feels that their physical health rating is same. Patient is very satisfied with their life. Eyesight was rated as same. Hearing was rated as same. Patient feels that their emotional and mental health rating is same. Patients states they are never, rarely angry. Patient states they are sometimes unusually tired/fatigued. Pain experienced in the last 7 days has been some. Patient's pain rating has been 4/10. Patient states that he has experienced no weight loss or gain in last 6 months. Chronic ankle pain    Depression Screening:   PHQ-2 Score: 0      Fall Risk Screening:   In the past year, patient has experienced: no history of falling in past year      Home Safety:  Patient does not have trouble with stairs inside or outside of  their home. Patient has working smoke alarms and has no working carbon monoxide detector. Home safety hazards include: none.     Nutrition:   Current diet is Diabetic and Limited junk food.     Medications:   Patient is not currently taking any over-the-counter supplements. Patient is able to manage medications.     Activities of Daily Living (ADLs)/Instrumental Activities of Daily Living (IADLs):   Walk and transfer into and out of bed and chair?: Yes  Dress and groom yourself?: Yes    Bathe or shower yourself?: Yes    Feed yourself? Yes  Do your laundry/housekeeping?: Yes  Manage your money, pay your bills and track your expenses?: Yes  Make your own meals?: Yes    Do your own shopping?: Yes    Previous Hospitalizations:   Any hospitalizations or ED visits within the last 12 months?: No      Advance Care Planning:   Living will: No    Durable POA for healthcare: No    Advanced directive: No    Advanced directive counseling given: Yes    ACP document given: Yes      Cognitive Screening:   Provider or family/friend/caregiver concerned regarding cognition?: No    Preventive Screenings      Cardiovascular Screening:    General: History Lipid Disorder and Risks and Benefits Discussed    Due for: Lipid Panel      Diabetes Screening:     General: Screening Not Indicated, History Diabetes and Screening Current      Colorectal Cancer Screening:     General: Patient Declines      Prostate Cancer Screening:    General: Screening Not Indicated      Osteoporosis Screening:    General: Risks and Benefits Discussed    Due for: Bone Density Ultrasound      Abdominal Aortic Aneurysm (AAA) Screening:        General: Patient Declines and Screening Not Indicated      Lung Cancer Screening:     General: Screening Not Indicated      Hepatitis C Screening:    General: Risks and Benefits Discussed    Hep C Screening Accepted: No     Immunizations:  - Immunizations due: Zoster (Shingrix)    Screening, Brief Intervention, and Referral to  "Treatment (SBIRT)     Screening  Typical number of drinks in a day: 1  Typical number of drinks in a week: 3  Interpretation: Low risk drinking behavior.    Single Item Drug Screening:  How often have you used an illegal drug (including marijuana) or a prescription medication for non-medical reasons in the past year? never    Single Item Drug Screen Score: 0  Interpretation: Negative screen for possible drug use disorder    Brief Intervention  Alcohol & drug use screenings were reviewed. No concerns regarding substance use disorder identified. Healthy alcohol use/limits discussed.     Other Counseling Topics:   Car/seat belt/driving safety, skin self-exam, sunscreen and calcium and vitamin D intake and regular weightbearing exercise.     Social Drivers of Health     Financial Resource Strain: Low Risk  (4/27/2023)    Overall Financial Resource Strain (CARDIA)    • Difficulty of Paying Living Expenses: Not hard at all   Food Insecurity: No Food Insecurity (6/30/2025)    Nursing - Inadequate Food Risk Classification    • Worried About Running Out of Food in the Last Year: Never true    • Ran Out of Food in the Last Year: Never true   Transportation Needs: No Transportation Needs (6/30/2025)    PRAPARE - Transportation    • Lack of Transportation (Medical): No    • Lack of Transportation (Non-Medical): No   Housing Stability: Low Risk  (6/30/2025)    Housing Stability Vital Sign    • Unable to Pay for Housing in the Last Year: No    • Number of Times Moved in the Last Year: 0    • Homeless in the Last Year: No   Utilities: Not At Risk (6/30/2025)    [facility]    • Threatened with loss of utilities: No     No results found.    Objective   /70   Pulse 71   Temp 97.9 °F (36.6 °C) (Temporal)   Resp 20   Ht 5' 10\" (1.778 m)   Wt 104 kg (229 lb 12.8 oz)   SpO2 96%   BMI 32.97 kg/m²     Physical Exam  Vitals and nursing note reviewed.   Constitutional:       Appearance: Normal appearance.     Cardiovascular: "      Rate and Rhythm: Normal rate and regular rhythm.      Pulses: Normal pulses.      Heart sounds: Murmur heard.   Pulmonary:      Effort: Pulmonary effort is normal.      Breath sounds: Normal breath sounds.     Musculoskeletal:      Right lower leg: Edema present.      Left lower leg: Edema present.     Neurological:      General: No focal deficit present.      Mental Status: He is alert. Mental status is at baseline.

## 2025-06-30 NOTE — PATIENT INSTRUCTIONS
Medicare Preventive Visit Patient Instructions  Thank you for completing your Welcome to Medicare Visit or Medicare Annual Wellness Visit today. Your next wellness visit will be due in one year (7/1/2026).  The screening/preventive services that you may require over the next 5-10 years are detailed below. Some tests may not apply to you based off risk factors and/or age. Screening tests ordered at today's visit but not completed yet may show as past due. Also, please note that scanned in results may not display below.  Preventive Screenings:  Service Recommendations Previous Testing/Comments   Colorectal Cancer Screening  Colonoscopy    Fecal Occult Blood Test (FOBT)/Fecal Immunochemical Test (FIT)  Fecal DNA/Cologuard Test  Flexible Sigmoidoscopy Age: 45-75 years old   Colonoscopy: every 10 years (May be performed more frequently if at higher risk)  OR  FOBT/FIT: every 1 year  OR  Cologuard: every 3 years  OR  Sigmoidoscopy: every 5 years  Screening may be recommended earlier than age 45 if at higher risk for colorectal cancer. Also, an individualized decision between you and your healthcare provider will decide whether screening between the ages of 76-85 would be appropriate. Colonoscopy: Not on file  FOBT/FIT: Not on file  Cologuard: Not on file  Sigmoidoscopy: Not on file          Prostate Cancer Screening Individualized decision between patient and health care provider in men between ages of 55-69   Medicare will cover every 12 months beginning on the day after your 50th birthday PSA: 2.1 ng/mL           Hepatitis C Screening Once for adults born between 1945 and 1965  More frequently in patients at high risk for Hepatitis C Hep C Antibody: Not on file        Diabetes Screening 1-2 times per year if you're at risk for diabetes or have pre-diabetes Fasting glucose: 156 mg/dL (6/4/2025)  A1C: 7.6 (6/5/2025)      Cholesterol Screening Once every 5 years if you don't have a lipid disorder. May order more often  based on risk factors. Lipid panel: 06/24/2024         Other Preventive Screenings Covered by Medicare:  Abdominal Aortic Aneurysm (AAA) Screening: covered once if your at risk. You're considered to be at risk if you have a family history of AAA or a male between the age of 65-75 who smoking at least 100 cigarettes in your lifetime.  Lung Cancer Screening: covers low dose CT scan once per year if you meet all of the following conditions: (1) Age 55-77; (2) No signs or symptoms of lung cancer; (3) Current smoker or have quit smoking within the last 15 years; (4) You have a tobacco smoking history of at least 20 pack years (packs per day x number of years you smoked); (5) You get a written order from a healthcare provider.  Glaucoma Screening: covered annually if you're considered high risk: (1) You have diabetes OR (2) Family history of glaucoma OR (3)  aged 50 and older OR (4)  American aged 65 and older  Osteoporosis Screening: covered every 2 years if you meet one of the following conditions: (1) Have a vertebral abnormality; (2) On glucocorticoid therapy for more than 3 months; (3) Have primary hyperparathyroidism; (4) On osteoporosis medications and need to assess response to drug therapy.  HIV Screening: covered annually if you're between the age of 15-65. Also covered annually if you are younger than 15 and older than 65 with risk factors for HIV infection. For pregnant patients, it is covered up to 3 times per pregnancy.    Immunizations:  Immunization Recommendations   Influenza Vaccine Annual influenza vaccination during flu season is recommended for all persons aged >= 6 months who do not have contraindications   Pneumococcal Vaccine   * Pneumococcal conjugate vaccine = PCV13 (Prevnar 13), PCV15 (Vaxneuvance), PCV20 (Prevnar 20)  * Pneumococcal polysaccharide vaccine = PPSV23 (Pneumovax) Adults 19-63 yo with certain risk factors or if 65+ yo  If never received any pneumonia vaccine:  recommend Prevnar 20 (PCV20)  Give PCV20 if previously received 1 dose of PCV13 or PPSV23   Hepatitis B Vaccine 3 dose series if at intermediate or high risk (ex: diabetes, end stage renal disease, liver disease)   Respiratory syncytial virus (RSV) Vaccine - COVERED BY MEDICARE PART D  * RSVPreF3 (Arexvy) CDC recommends that adults 60 years of age and older may receive a single dose of RSV vaccine using shared clinical decision-making (SCDM)   Tetanus (Td) Vaccine - COST NOT COVERED BY MEDICARE PART B Following completion of primary series, a booster dose should be given every 10 years to maintain immunity against tetanus. Td may also be given as tetanus wound prophylaxis.   Tdap Vaccine - COST NOT COVERED BY MEDICARE PART B Recommended at least once for all adults. For pregnant patients, recommended with each pregnancy.   Shingles Vaccine (Shingrix) - COST NOT COVERED BY MEDICARE PART B  2 shot series recommended in those 19 years and older who have or will have weakened immune systems or those 50 years and older     Health Maintenance Due:  There are no preventive care reminders to display for this patient.  Immunizations Due:      Topic Date Due   • COVID-19 Vaccine (3 - 2024-25 season) 09/01/2024     Advance Directives   What are advance directives?  Advance directives are legal documents that state your wishes and plans for medical care. These plans are made ahead of time in case you lose your ability to make decisions for yourself. Advance directives can apply to any medical decision, such as the treatments you want, and if you want to donate organs.   What are the types of advance directives?  There are many types of advance directives, and each state has rules about how to use them. You may choose a combination of any of the following:  Living will:  This is a written record of the treatment you want. You can also choose which treatments you do not want, which to limit, and which to stop at a certain time.  This includes surgery, medicine, IV fluid, and tube feedings.   Durable power of  for healthcare (DPAHC):  This is a written record that states who you want to make healthcare choices for you when you are unable to make them for yourself. This person, called a proxy, is usually a family member or a friend. You may choose more than 1 proxy.  Do not resuscitate (DNR) order:  A DNR order is used in case your heart stops beating or you stop breathing. It is a request not to have certain forms of treatment, such as CPR. A DNR order may be included in other types of advance directives.  Medical directive:  This covers the care that you want if you are in a coma, near death, or unable to make decisions for yourself. You can list the treatments you want for each condition. Treatment may include pain medicine, surgery, blood transfusions, dialysis, IV or tube feedings, and a ventilator (breathing machine).  Values history:  This document has questions about your views, beliefs, and how you feel and think about life. This information can help others choose the care that you would choose.  Why are advance directives important?  An advance directive helps you control your care. Although spoken wishes may be used, it is better to have your wishes written down. Spoken wishes can be misunderstood, or not followed. Treatments may be given even if you do not want them. An advance directive may make it easier for your family to make difficult choices about your care.   Weight Management   Why it is important to manage your weight:  Being overweight increases your risk of health conditions such as heart disease, high blood pressure, type 2 diabetes, and certain types of cancer. It can also increase your risk for osteoarthritis, sleep apnea, and other respiratory problems. Aim for a slow, steady weight loss. Even a small amount of weight loss can lower your risk of health problems.  How to lose weight safely:  A safe and  healthy way to lose weight is to eat fewer calories and get regular exercise. You can lose up about 1 pound a week by decreasing the number of calories you eat by 500 calories each day.   Healthy meal plan for weight management:  A healthy meal plan includes a variety of foods, contains fewer calories, and helps you stay healthy. A healthy meal plan includes the following:  Eat whole-grain foods more often.  A healthy meal plan should contain fiber. Fiber is the part of grains, fruits, and vegetables that is not broken down by your body. Whole-grain foods are healthy and provide extra fiber in your diet. Some examples of whole-grain foods are whole-wheat breads and pastas, oatmeal, brown rice, and bulgur.  Eat a variety of vegetables every day.  Include dark, leafy greens such as spinach, kale, robert greens, and mustard greens. Eat yellow and orange vegetables such as carrots, sweet potatoes, and winter squash.   Eat a variety of fruits every day.  Choose fresh or canned fruit (canned in its own juice or light syrup) instead of juice. Fruit juice has very little or no fiber.  Eat low-fat dairy foods.  Drink fat-free (skim) milk or 1% milk. Eat fat-free yogurt and low-fat cottage cheese. Try low-fat cheeses such as mozzarella and other reduced-fat cheeses.  Choose meat and other protein foods that are low in fat.  Choose beans or other legumes such as split peas or lentils. Choose fish, skinless poultry (chicken or turkey), or lean cuts of red meat (beef or pork). Before you cook meat or poultry, cut off any visible fat.   Use less fat and oil.  Try baking foods instead of frying them. Add less fat, such as margarine, sour cream, regular salad dressing and mayonnaise to foods. Eat fewer high-fat foods. Some examples of high-fat foods include french fries, doughnuts, ice cream, and cakes.  Eat fewer sweets.  Limit foods and drinks that are high in sugar. This includes candy, cookies, regular soda, and sweetened  drinks.  Exercise:  Exercise at least 30 minutes per day on most days of the week. Some examples of exercise include walking, biking, dancing, and swimming. You can also fit in more physical activity by taking the stairs instead of the elevator or parking farther away from stores. Ask your healthcare provider about the best exercise plan for you.    © Copyright [employer] 2018 Information is for End User's use only and may not be sold, redistributed or otherwise used for commercial purposes. All illustrations and images included in CareNotes® are the copyrighted property of [employer] or [employer]

## 2025-07-08 DIAGNOSIS — E11.9 TYPE 2 DIABETES MELLITUS WITHOUT COMPLICATION, WITH LONG-TERM CURRENT USE OF INSULIN (HCC): ICD-10-CM

## 2025-07-08 DIAGNOSIS — Z79.4 TYPE 2 DIABETES MELLITUS WITHOUT COMPLICATION, WITH LONG-TERM CURRENT USE OF INSULIN (HCC): ICD-10-CM

## 2025-07-08 DIAGNOSIS — I12.9 BENIGN HYPERTENSION WITH CHRONIC KIDNEY DISEASE: ICD-10-CM

## 2025-07-08 DIAGNOSIS — E78.2 MIXED HYPERLIPIDEMIA: ICD-10-CM

## 2025-07-08 RX ORDER — EZETIMIBE 10 MG/1
10 TABLET ORAL DAILY
Qty: 30 TABLET | Refills: 0 | Status: SHIPPED | OUTPATIENT
Start: 2025-07-08

## 2025-07-08 RX ORDER — LINAGLIPTIN 5 MG/1
5 TABLET, FILM COATED ORAL DAILY
Qty: 30 TABLET | Refills: 5 | Status: SHIPPED | OUTPATIENT
Start: 2025-07-08

## 2025-07-08 RX ORDER — CARVEDILOL 12.5 MG/1
12.5 TABLET ORAL 2 TIMES DAILY
Qty: 60 TABLET | Refills: 5 | Status: SHIPPED | OUTPATIENT
Start: 2025-07-08

## 2025-07-08 RX ORDER — CLONIDINE 0.1 MG/24H
1 PATCH, EXTENDED RELEASE TRANSDERMAL WEEKLY
Qty: 12 PATCH | Refills: 1 | Status: SHIPPED | OUTPATIENT
Start: 2025-07-08

## 2025-07-15 ENCOUNTER — TELEPHONE (OUTPATIENT)
Dept: NEPHROLOGY | Facility: CLINIC | Age: 85
End: 2025-07-15

## 2025-07-22 ENCOUNTER — APPOINTMENT (OUTPATIENT)
Dept: LAB | Facility: CLINIC | Age: 85
End: 2025-07-22
Payer: COMMERCIAL

## 2025-07-22 DIAGNOSIS — E11.65 TYPE 2 DIABETES MELLITUS WITH HYPERGLYCEMIA, WITHOUT LONG-TERM CURRENT USE OF INSULIN (HCC): ICD-10-CM

## 2025-07-22 DIAGNOSIS — E78.2 MIXED HYPERLIPIDEMIA: ICD-10-CM

## 2025-07-22 DIAGNOSIS — I12.9 BENIGN HYPERTENSION WITH CHRONIC KIDNEY DISEASE: ICD-10-CM

## 2025-07-22 LAB
ALBUMIN SERPL BCG-MCNC: 4 G/DL (ref 3.5–5)
ANION GAP SERPL CALCULATED.3IONS-SCNC: 10 MMOL/L (ref 4–13)
BUN SERPL-MCNC: 44 MG/DL (ref 5–25)
CALCIUM SERPL-MCNC: 9.4 MG/DL (ref 8.4–10.2)
CHLORIDE SERPL-SCNC: 102 MMOL/L (ref 96–108)
CO2 SERPL-SCNC: 29 MMOL/L (ref 21–32)
CREAT SERPL-MCNC: 2.41 MG/DL (ref 0.6–1.3)
CREAT UR-MCNC: 51.9 MG/DL
GFR SERPL CREATININE-BSD FRML MDRD: 23 ML/MIN/1.73SQ M
GLUCOSE P FAST SERPL-MCNC: 153 MG/DL (ref 65–99)
MAGNESIUM SERPL-MCNC: 2.2 MG/DL (ref 1.9–2.7)
MICROALBUMIN UR-MCNC: 218.4 MG/L
MICROALBUMIN/CREAT 24H UR: 421 MG/G CREATININE (ref 0–30)
PHOSPHATE SERPL-MCNC: 3.4 MG/DL (ref 2.3–4.1)
POTASSIUM SERPL-SCNC: 3.4 MMOL/L (ref 3.5–5.3)
SODIUM SERPL-SCNC: 141 MMOL/L (ref 135–147)

## 2025-07-22 PROCEDURE — 82570 ASSAY OF URINE CREATININE: CPT

## 2025-07-22 PROCEDURE — 82043 UR ALBUMIN QUANTITATIVE: CPT

## 2025-07-22 PROCEDURE — 80069 RENAL FUNCTION PANEL: CPT

## 2025-07-22 PROCEDURE — 83735 ASSAY OF MAGNESIUM: CPT

## 2025-07-23 ENCOUNTER — TELEPHONE (OUTPATIENT)
Dept: NEPHROLOGY | Facility: CLINIC | Age: 85
End: 2025-07-23

## 2025-07-23 NOTE — TELEPHONE ENCOUNTER
I called the patient and we spoke over the phone.   Recent laboratory data were reviewed over the phone.     Lab Results   Component Value Date    SODIUM 141 07/22/2025    K 3.4 (L) 07/22/2025     07/22/2025    CO2 29 07/22/2025    BUN 44 (H) 07/22/2025    CREATININE 2.41 (H) 07/22/2025    GLUC 138 12/29/2023    CALCIUM 9.4 07/22/2025     Renal function is better.   He was on Torsemide 40 mg AM and 20 mg PM which we decreased to 20 mg BID on 6/7/25.   Since the decrease, he notes that his swelling was worse so he increased the Torsemide to 30 mg in AM and 20 mg in PM.     Plan:  No changes.   Advised high K diet for K of 3.4.

## 2025-08-13 ENCOUNTER — TELEPHONE (OUTPATIENT)
Dept: OTHER | Facility: HOSPITAL | Age: 85
End: 2025-08-13

## 2025-08-13 ENCOUNTER — HOSPITAL ENCOUNTER (OUTPATIENT)
Dept: VASCULAR ULTRASOUND | Facility: HOSPITAL | Age: 85
Discharge: HOME | End: 2025-08-13
Attending: INTERNAL MEDICINE
Payer: COMMERCIAL

## 2025-08-13 ENCOUNTER — HOSPITAL ENCOUNTER (OUTPATIENT)
Dept: NON INVASIVE DIAGNOSTICS | Facility: HOSPITAL | Age: 85
Discharge: HOME | End: 2025-08-13
Attending: INTERNAL MEDICINE
Payer: COMMERCIAL

## 2025-08-14 PROBLEM — I35.0 MODERATE AORTIC STENOSIS: Status: ACTIVE | Noted: 2025-08-14

## 2025-08-18 ENCOUNTER — TELEPHONE (OUTPATIENT)
Age: 85
End: 2025-08-18